# Patient Record
Sex: FEMALE | Race: WHITE | NOT HISPANIC OR LATINO | URBAN - METROPOLITAN AREA
[De-identification: names, ages, dates, MRNs, and addresses within clinical notes are randomized per-mention and may not be internally consistent; named-entity substitution may affect disease eponyms.]

---

## 2017-04-27 ENCOUNTER — OUTPATIENT (OUTPATIENT)
Dept: OUTPATIENT SERVICES | Facility: HOSPITAL | Age: 82
LOS: 1 days | Discharge: HOME | End: 2017-04-27

## 2017-05-04 ENCOUNTER — APPOINTMENT (OUTPATIENT)
Dept: CARDIOLOGY | Facility: CLINIC | Age: 82
End: 2017-05-04

## 2017-05-04 VITALS
DIASTOLIC BLOOD PRESSURE: 68 MMHG | WEIGHT: 128 LBS | HEART RATE: 59 BPM | HEIGHT: 61 IN | BODY MASS INDEX: 24.17 KG/M2 | SYSTOLIC BLOOD PRESSURE: 144 MMHG

## 2017-06-28 DIAGNOSIS — Z12.31 ENCOUNTER FOR SCREENING MAMMOGRAM FOR MALIGNANT NEOPLASM OF BREAST: ICD-10-CM

## 2017-11-01 ENCOUNTER — OUTPATIENT (OUTPATIENT)
Dept: OUTPATIENT SERVICES | Facility: HOSPITAL | Age: 82
LOS: 1 days | Discharge: HOME | End: 2017-11-01

## 2017-11-01 DIAGNOSIS — M65.331 TRIGGER FINGER, RIGHT MIDDLE FINGER: ICD-10-CM

## 2017-11-01 DIAGNOSIS — M65.321 TRIGGER FINGER, RIGHT INDEX FINGER: ICD-10-CM

## 2017-11-01 DIAGNOSIS — Z01.818 ENCOUNTER FOR OTHER PREPROCEDURAL EXAMINATION: ICD-10-CM

## 2017-11-01 DIAGNOSIS — G56.01 CARPAL TUNNEL SYNDROME, RIGHT UPPER LIMB: ICD-10-CM

## 2017-11-07 ENCOUNTER — APPOINTMENT (OUTPATIENT)
Dept: CARDIOLOGY | Facility: CLINIC | Age: 82
End: 2017-11-07

## 2017-11-07 VITALS
BODY MASS INDEX: 24.73 KG/M2 | WEIGHT: 131 LBS | DIASTOLIC BLOOD PRESSURE: 80 MMHG | SYSTOLIC BLOOD PRESSURE: 132 MMHG | HEIGHT: 61 IN

## 2017-11-07 RX ORDER — MONTELUKAST SODIUM 10 MG/1
10 TABLET, FILM COATED ORAL DAILY
Refills: 0 | Status: ACTIVE | COMMUNITY

## 2017-11-07 RX ORDER — BIOTIN 5 MG
5 CAPSULE ORAL DAILY
Refills: 0 | Status: ACTIVE | COMMUNITY

## 2017-11-15 ENCOUNTER — OUTPATIENT (OUTPATIENT)
Dept: OUTPATIENT SERVICES | Facility: HOSPITAL | Age: 82
LOS: 1 days | Discharge: HOME | End: 2017-11-15

## 2017-11-20 DIAGNOSIS — G56.01 CARPAL TUNNEL SYNDROME, RIGHT UPPER LIMB: ICD-10-CM

## 2017-11-20 DIAGNOSIS — M65.331 TRIGGER FINGER, RIGHT MIDDLE FINGER: ICD-10-CM

## 2017-11-20 DIAGNOSIS — M65.321 TRIGGER FINGER, RIGHT INDEX FINGER: ICD-10-CM

## 2017-11-20 DIAGNOSIS — M65.341 TRIGGER FINGER, RIGHT RING FINGER: ICD-10-CM

## 2017-11-20 DIAGNOSIS — E78.00 PURE HYPERCHOLESTEROLEMIA, UNSPECIFIED: ICD-10-CM

## 2018-05-22 ENCOUNTER — OUTPATIENT (OUTPATIENT)
Dept: OUTPATIENT SERVICES | Facility: HOSPITAL | Age: 83
LOS: 1 days | Discharge: HOME | End: 2018-05-22

## 2018-05-22 DIAGNOSIS — E53.8 DEFICIENCY OF OTHER SPECIFIED B GROUP VITAMINS: ICD-10-CM

## 2018-05-22 DIAGNOSIS — D64.9 ANEMIA, UNSPECIFIED: ICD-10-CM

## 2018-05-22 DIAGNOSIS — E11.9 TYPE 2 DIABETES MELLITUS WITHOUT COMPLICATIONS: ICD-10-CM

## 2018-05-22 DIAGNOSIS — E78.5 HYPERLIPIDEMIA, UNSPECIFIED: ICD-10-CM

## 2018-05-22 DIAGNOSIS — E05.90 THYROTOXICOSIS, UNSPECIFIED WITHOUT THYROTOXIC CRISIS OR STORM: ICD-10-CM

## 2018-05-22 DIAGNOSIS — D50.9 IRON DEFICIENCY ANEMIA, UNSPECIFIED: ICD-10-CM

## 2018-05-22 DIAGNOSIS — E55.9 VITAMIN D DEFICIENCY, UNSPECIFIED: ICD-10-CM

## 2018-05-22 DIAGNOSIS — N39.0 URINARY TRACT INFECTION, SITE NOT SPECIFIED: ICD-10-CM

## 2019-07-29 ENCOUNTER — OUTPATIENT (OUTPATIENT)
Dept: OUTPATIENT SERVICES | Facility: HOSPITAL | Age: 84
LOS: 1 days | Discharge: HOME | End: 2019-07-29
Payer: MEDICARE

## 2019-07-29 DIAGNOSIS — R42 DIZZINESS AND GIDDINESS: ICD-10-CM

## 2019-07-29 PROCEDURE — 93880 EXTRACRANIAL BILAT STUDY: CPT | Mod: 26

## 2019-08-01 ENCOUNTER — OUTPATIENT (OUTPATIENT)
Dept: OUTPATIENT SERVICES | Facility: HOSPITAL | Age: 84
LOS: 1 days | Discharge: HOME | End: 2019-08-01

## 2019-08-01 DIAGNOSIS — E05.90 THYROTOXICOSIS, UNSPECIFIED WITHOUT THYROTOXIC CRISIS OR STORM: ICD-10-CM

## 2019-08-01 DIAGNOSIS — E55.9 VITAMIN D DEFICIENCY, UNSPECIFIED: ICD-10-CM

## 2019-08-01 DIAGNOSIS — N39.0 URINARY TRACT INFECTION, SITE NOT SPECIFIED: ICD-10-CM

## 2019-08-01 DIAGNOSIS — D64.9 ANEMIA, UNSPECIFIED: ICD-10-CM

## 2019-08-01 DIAGNOSIS — E53.8 DEFICIENCY OF OTHER SPECIFIED B GROUP VITAMINS: ICD-10-CM

## 2019-08-01 DIAGNOSIS — E78.5 HYPERLIPIDEMIA, UNSPECIFIED: ICD-10-CM

## 2019-08-01 DIAGNOSIS — E11.9 TYPE 2 DIABETES MELLITUS WITHOUT COMPLICATIONS: ICD-10-CM

## 2019-08-01 DIAGNOSIS — E83.40 DISORDERS OF MAGNESIUM METABOLISM, UNSPECIFIED: ICD-10-CM

## 2019-08-01 DIAGNOSIS — D50.9 IRON DEFICIENCY ANEMIA, UNSPECIFIED: ICD-10-CM

## 2020-09-03 ENCOUNTER — APPOINTMENT (OUTPATIENT)
Dept: CARDIOLOGY | Facility: CLINIC | Age: 85
End: 2020-09-03
Payer: MEDICARE

## 2020-09-03 VITALS
DIASTOLIC BLOOD PRESSURE: 80 MMHG | WEIGHT: 133 LBS | TEMPERATURE: 97.2 F | HEIGHT: 61 IN | HEART RATE: 66 BPM | SYSTOLIC BLOOD PRESSURE: 142 MMHG | BODY MASS INDEX: 25.11 KG/M2

## 2020-09-03 PROCEDURE — 99214 OFFICE O/P EST MOD 30 MIN: CPT

## 2020-09-03 PROCEDURE — 93000 ELECTROCARDIOGRAM COMPLETE: CPT

## 2020-09-03 RX ORDER — SERTRALINE HYDROCHLORIDE 50 MG/1
50 TABLET, FILM COATED ORAL DAILY
Refills: 0 | Status: ACTIVE | COMMUNITY

## 2020-09-03 RX ORDER — PREDNISONE 2.5 MG/1
2.5 TABLET ORAL EVERY MORNING
Refills: 0 | Status: ACTIVE | COMMUNITY

## 2020-09-03 NOTE — REASON FOR VISIT
[Follow-Up - Clinic] : a clinic follow-up of [Coronary Artery Disease] : coronary artery disease [FreeTextEntry2] : and possible syncope

## 2020-09-03 NOTE — PHYSICAL EXAM
[General Appearance - Well Developed] : well developed [Normal Appearance] : normal appearance [Well Groomed] : well groomed [General Appearance - Well Nourished] : well nourished [General Appearance - In No Acute Distress] : no acute distress [No Deformities] : no deformities [Normal Conjunctiva] : the conjunctiva exhibited no abnormalities [Eyelids - No Xanthelasma] : the eyelids demonstrated no xanthelasmas [No Oral Pallor] : no oral pallor [Normal Oral Mucosa] : normal oral mucosa [No Oral Cyanosis] : no oral cyanosis [Normal Jugular Venous A Waves Present] : normal jugular venous A waves present [Normal Jugular Venous V Waves Present] : normal jugular venous V waves present [No Jugular Venous Jonas A Waves] : no jugular venous jonas A waves [Exaggerated Use Of Accessory Muscles For Inspiration] : no accessory muscle use [Respiration, Rhythm And Depth] : normal respiratory rhythm and effort [Auscultation Breath Sounds / Voice Sounds] : lungs were clear to auscultation bilaterally [Heart Rate And Rhythm] : heart rate and rhythm were normal [Abdomen Soft] : soft [Murmurs] : no murmurs present [Heart Sounds] : normal S1 and S2 [Abdomen Tenderness] : non-tender [Abdomen Mass (___ Cm)] : no abdominal mass palpated [Abnormal Walk] : normal gait [Gait - Sufficient For Exercise Testing] : the gait was sufficient for exercise testing [Petechial Hemorrhages (___cm)] : no petechial hemorrhages [Nail Clubbing] : no clubbing of the fingernails [Cyanosis, Localized] : no localized cyanosis [Skin Color & Pigmentation] : normal skin color and pigmentation [] : no rash [No Venous Stasis] : no venous stasis [Skin Lesions] : no skin lesions [No Skin Ulcers] : no skin ulcer [Oriented To Time, Place, And Person] : oriented to person, place, and time [No Xanthoma] : no  xanthoma was observed [Affect] : the affect was normal [Mood] : the mood was normal [No Anxiety] : not feeling anxious

## 2020-09-03 NOTE — HISTORY OF PRESENT ILLNESS
[FreeTextEntry1] : no cp\par  no sob\par  no cardiac complaints\par \par s/p cabg\par class 1 angina\par class 1 sob\par no cardiac complaints\par \par patient had a syncopal episode of unceraint etiology\par denied chest pain or sob\par denied palpitatins

## 2020-09-06 ENCOUNTER — OUTPATIENT (OUTPATIENT)
Dept: OUTPATIENT SERVICES | Facility: HOSPITAL | Age: 85
LOS: 1 days | Discharge: HOME | End: 2020-09-06
Payer: MEDICARE

## 2020-09-06 DIAGNOSIS — R51 HEADACHE: ICD-10-CM

## 2020-09-06 DIAGNOSIS — R41.3 OTHER AMNESIA: ICD-10-CM

## 2020-09-06 DIAGNOSIS — R42 DIZZINESS AND GIDDINESS: ICD-10-CM

## 2020-09-06 PROCEDURE — 70552 MRI BRAIN STEM W/DYE: CPT | Mod: 26

## 2020-09-12 ENCOUNTER — APPOINTMENT (OUTPATIENT)
Dept: CARDIOLOGY | Facility: CLINIC | Age: 85
End: 2020-09-12
Payer: MEDICARE

## 2020-09-12 PROCEDURE — 93306 TTE W/DOPPLER COMPLETE: CPT

## 2020-10-25 ENCOUNTER — OUTPATIENT (OUTPATIENT)
Dept: OUTPATIENT SERVICES | Facility: HOSPITAL | Age: 85
LOS: 1 days | Discharge: HOME | End: 2020-10-25
Payer: MEDICARE

## 2020-10-25 ENCOUNTER — RESULT REVIEW (OUTPATIENT)
Age: 85
End: 2020-10-25

## 2020-10-25 DIAGNOSIS — R10.2 PELVIC AND PERINEAL PAIN: ICD-10-CM

## 2020-10-25 PROCEDURE — 72195 MRI PELVIS W/O DYE: CPT | Mod: 26

## 2020-10-29 ENCOUNTER — APPOINTMENT (OUTPATIENT)
Dept: CARDIOLOGY | Facility: CLINIC | Age: 85
End: 2020-10-29
Payer: MEDICARE

## 2020-10-29 VITALS
SYSTOLIC BLOOD PRESSURE: 140 MMHG | DIASTOLIC BLOOD PRESSURE: 82 MMHG | TEMPERATURE: 97.3 F | HEART RATE: 66 BPM | WEIGHT: 130 LBS | BODY MASS INDEX: 24.55 KG/M2 | HEIGHT: 61 IN

## 2020-10-29 PROCEDURE — 99214 OFFICE O/P EST MOD 30 MIN: CPT

## 2020-10-29 PROCEDURE — 93000 ELECTROCARDIOGRAM COMPLETE: CPT

## 2020-10-29 PROCEDURE — 99072 ADDL SUPL MATRL&STAF TM PHE: CPT

## 2020-10-29 NOTE — PHYSICAL EXAM
[General Appearance - Well Developed] : well developed [Normal Appearance] : normal appearance [Well Groomed] : well groomed [General Appearance - Well Nourished] : well nourished [No Deformities] : no deformities [General Appearance - In No Acute Distress] : no acute distress [Normal Conjunctiva] : the conjunctiva exhibited no abnormalities [Eyelids - No Xanthelasma] : the eyelids demonstrated no xanthelasmas [Normal Oral Mucosa] : normal oral mucosa [No Oral Pallor] : no oral pallor [No Oral Cyanosis] : no oral cyanosis [Normal Jugular Venous A Waves Present] : normal jugular venous A waves present [Normal Jugular Venous V Waves Present] : normal jugular venous V waves present [No Jugular Venous Jonas A Waves] : no jugular venous jonas A waves [Respiration, Rhythm And Depth] : normal respiratory rhythm and effort [Exaggerated Use Of Accessory Muscles For Inspiration] : no accessory muscle use [Auscultation Breath Sounds / Voice Sounds] : lungs were clear to auscultation bilaterally [Heart Rate And Rhythm] : heart rate and rhythm were normal [Heart Sounds] : normal S1 and S2 [Murmurs] : no murmurs present [Abdomen Soft] : soft [Abdomen Tenderness] : non-tender [Abdomen Mass (___ Cm)] : no abdominal mass palpated [Abnormal Walk] : normal gait [Gait - Sufficient For Exercise Testing] : the gait was sufficient for exercise testing [Nail Clubbing] : no clubbing of the fingernails [Cyanosis, Localized] : no localized cyanosis [Petechial Hemorrhages (___cm)] : no petechial hemorrhages [Skin Color & Pigmentation] : normal skin color and pigmentation [] : no rash [No Venous Stasis] : no venous stasis [Skin Lesions] : no skin lesions [No Skin Ulcers] : no skin ulcer [No Xanthoma] : no  xanthoma was observed [Oriented To Time, Place, And Person] : oriented to person, place, and time [Affect] : the affect was normal [Mood] : the mood was normal [No Anxiety] : not feeling anxious

## 2020-11-21 ENCOUNTER — RESULT REVIEW (OUTPATIENT)
Age: 85
End: 2020-11-21

## 2020-11-21 ENCOUNTER — OUTPATIENT (OUTPATIENT)
Dept: OUTPATIENT SERVICES | Facility: HOSPITAL | Age: 85
LOS: 1 days | Discharge: HOME | End: 2020-11-21
Payer: MEDICARE

## 2020-11-21 DIAGNOSIS — M54.5 LOW BACK PAIN: ICD-10-CM

## 2020-11-21 PROCEDURE — 72148 MRI LUMBAR SPINE W/O DYE: CPT | Mod: 26

## 2020-11-24 ENCOUNTER — OUTPATIENT (OUTPATIENT)
Dept: OUTPATIENT SERVICES | Facility: HOSPITAL | Age: 85
LOS: 1 days | Discharge: HOME | End: 2020-11-24

## 2020-11-25 DIAGNOSIS — M89.9 DISORDER OF BONE, UNSPECIFIED: ICD-10-CM

## 2020-11-25 DIAGNOSIS — Z13.820 ENCOUNTER FOR SCREENING FOR OSTEOPOROSIS: ICD-10-CM

## 2020-11-25 DIAGNOSIS — Z78.0 ASYMPTOMATIC MENOPAUSAL STATE: ICD-10-CM

## 2021-03-04 ENCOUNTER — APPOINTMENT (OUTPATIENT)
Dept: CARDIOLOGY | Facility: CLINIC | Age: 86
End: 2021-03-04

## 2021-05-05 ENCOUNTER — APPOINTMENT (OUTPATIENT)
Dept: CARDIOLOGY | Facility: CLINIC | Age: 86
End: 2021-05-05
Payer: MEDICARE

## 2021-05-05 VITALS
TEMPERATURE: 97.6 F | DIASTOLIC BLOOD PRESSURE: 82 MMHG | HEART RATE: 57 BPM | HEIGHT: 61 IN | WEIGHT: 133 LBS | BODY MASS INDEX: 25.11 KG/M2 | SYSTOLIC BLOOD PRESSURE: 140 MMHG

## 2021-05-05 PROCEDURE — 99072 ADDL SUPL MATRL&STAF TM PHE: CPT

## 2021-05-05 PROCEDURE — 99214 OFFICE O/P EST MOD 30 MIN: CPT

## 2021-05-05 PROCEDURE — 93000 ELECTROCARDIOGRAM COMPLETE: CPT

## 2021-05-05 NOTE — PHYSICAL EXAM
[General Appearance - Well Developed] : well developed [Normal Appearance] : normal appearance [Well Groomed] : well groomed [General Appearance - Well Nourished] : well nourished [No Deformities] : no deformities [General Appearance - In No Acute Distress] : no acute distress [Normal Conjunctiva] : the conjunctiva exhibited no abnormalities [Eyelids - No Xanthelasma] : the eyelids demonstrated no xanthelasmas [Normal Oral Mucosa] : normal oral mucosa [No Oral Pallor] : no oral pallor [No Oral Cyanosis] : no oral cyanosis [Normal Jugular Venous A Waves Present] : normal jugular venous A waves present [Normal Jugular Venous V Waves Present] : normal jugular venous V waves present [No Jugular Venous Jonas A Waves] : no jugular venous jonas A waves [Respiration, Rhythm And Depth] : normal respiratory rhythm and effort [Exaggerated Use Of Accessory Muscles For Inspiration] : no accessory muscle use [Auscultation Breath Sounds / Voice Sounds] : lungs were clear to auscultation bilaterally [Heart Rate And Rhythm] : heart rate and rhythm were normal [Heart Sounds] : normal S1 and S2 [Murmurs] : no murmurs present [Abdomen Soft] : soft [Abdomen Tenderness] : non-tender [Abdomen Mass (___ Cm)] : no abdominal mass palpated [Abnormal Walk] : normal gait [Gait - Sufficient For Exercise Testing] : the gait was sufficient for exercise testing [Nail Clubbing] : no clubbing of the fingernails [Cyanosis, Localized] : no localized cyanosis [Petechial Hemorrhages (___cm)] : no petechial hemorrhages [Skin Color & Pigmentation] : normal skin color and pigmentation [] : no rash [No Venous Stasis] : no venous stasis [No Skin Ulcers] : no skin ulcer [Skin Lesions] : no skin lesions [No Xanthoma] : no  xanthoma was observed [Oriented To Time, Place, And Person] : oriented to person, place, and time [Affect] : the affect was normal [Mood] : the mood was normal [No Anxiety] : not feeling anxious

## 2021-05-11 NOTE — HISTORY OF PRESENT ILLNESS
[FreeTextEntry1] : no cp\par  no sob\par  no cardiac complaints\par \par s/p cabg OVER 20 YEARS AGO\par [place] TO THE LAD\par class 1 angina\par class 1 sob\par no cardiac complaints\par \par patient had a syncopal episode of unceraint etiology\par denied chest pain or sob\par denied palpitatins\par \par NO RECURRENCE \par NO TIA, CVA OR PVD\par  NO PALPS

## 2021-09-29 ENCOUNTER — APPOINTMENT (OUTPATIENT)
Dept: CARDIOLOGY | Facility: CLINIC | Age: 86
End: 2021-09-29
Payer: MEDICARE

## 2021-09-29 VITALS
BODY MASS INDEX: 25.3 KG/M2 | DIASTOLIC BLOOD PRESSURE: 90 MMHG | SYSTOLIC BLOOD PRESSURE: 154 MMHG | TEMPERATURE: 97.1 F | WEIGHT: 134 LBS | HEART RATE: 61 BPM | HEIGHT: 61 IN

## 2021-09-29 PROCEDURE — 93000 ELECTROCARDIOGRAM COMPLETE: CPT

## 2021-09-29 PROCEDURE — 99214 OFFICE O/P EST MOD 30 MIN: CPT

## 2021-09-29 RX ORDER — CHROMIUM 200 MCG
TABLET ORAL
Refills: 0 | Status: ACTIVE | COMMUNITY

## 2021-09-29 NOTE — PHYSICAL EXAM
[Well Developed] : well developed [Well Nourished] : well nourished [No Acute Distress] : no acute distress [Normal Venous Pressure] : normal venous pressure [No Carotid Bruit] : no carotid bruit [Normal S1, S2] : normal S1, S2 [No Murmur] : no murmur [No Rub] : no rub [No Gallop] : no gallop [Clear Lung Fields] : clear lung fields [Good Air Entry] : good air entry [No Respiratory Distress] : no respiratory distress  [Soft] : abdomen soft [Non Tender] : non-tender [No Masses/organomegaly] : no masses/organomegaly [Normal Bowel Sounds] : normal bowel sounds [Normal Gait] : normal gait [No Edema] : no edema [No Cyanosis] : no cyanosis [No Clubbing] : no clubbing [No Varicosities] : no varicosities [No Rash] : no rash [No Skin Lesions] : no skin lesions [Moves all extremities] : moves all extremities [No Focal Deficits] : no focal deficits [Normal Speech] : normal speech [Alert and Oriented] : alert and oriented [Normal memory] : normal memory [General Appearance - Well Developed] : well developed [Normal Appearance] : normal appearance [Well Groomed] : well groomed [General Appearance - Well Nourished] : well nourished [No Deformities] : no deformities [General Appearance - In No Acute Distress] : no acute distress [Normal Conjunctiva] : the conjunctiva exhibited no abnormalities [Eyelids - No Xanthelasma] : the eyelids demonstrated no xanthelasmas [Normal Oral Mucosa] : normal oral mucosa [No Oral Pallor] : no oral pallor [No Oral Cyanosis] : no oral cyanosis [Normal Jugular Venous A Waves Present] : normal jugular venous A waves present [Normal Jugular Venous V Waves Present] : normal jugular venous V waves present [No Jugular Venous Jonas A Waves] : no jugular venous jonas A waves [Respiration, Rhythm And Depth] : normal respiratory rhythm and effort [Exaggerated Use Of Accessory Muscles For Inspiration] : no accessory muscle use [Auscultation Breath Sounds / Voice Sounds] : lungs were clear to auscultation bilaterally [Heart Rate And Rhythm] : heart rate and rhythm were normal [Heart Sounds] : normal S1 and S2 [Murmurs] : no murmurs present [Abdomen Tenderness] : non-tender [Abdomen Soft] : soft [Abdomen Mass (___ Cm)] : no abdominal mass palpated [Abnormal Walk] : normal gait [Gait - Sufficient For Exercise Testing] : the gait was sufficient for exercise testing [Nail Clubbing] : no clubbing of the fingernails [Cyanosis, Localized] : no localized cyanosis [Petechial Hemorrhages (___cm)] : no petechial hemorrhages [Skin Color & Pigmentation] : normal skin color and pigmentation [] : no rash [No Venous Stasis] : no venous stasis [Skin Lesions] : no skin lesions [No Skin Ulcers] : no skin ulcer [No Xanthoma] : no  xanthoma was observed [Oriented To Time, Place, And Person] : oriented to person, place, and time [Affect] : the affect was normal [Mood] : the mood was normal [No Anxiety] : not feeling anxious

## 2021-09-29 NOTE — HISTORY OF PRESENT ILLNESS
[FreeTextEntry1] : no cp\par  no sob\par  no cardiac complaints\par \par s/p cabg OVER 23 YEARS AGO\par [place] TO THE LAD\par class 1 angina\par class 1 sob\par no cardiac complaints\par \par patient had a syncopal episode of unceraint etiology\par denied chest pain or sob\par denied palpitatins\par \par NO RECURRENCE \par NO TIA, CVA OR PVD\par  NO PALPS

## 2022-07-20 ENCOUNTER — EMERGENCY (EMERGENCY)
Facility: HOSPITAL | Age: 87
LOS: 0 days | Discharge: LEFT BEFORE DISCHARGE | End: 2022-07-20
Admitting: EMERGENCY MEDICINE

## 2022-07-20 VITALS
TEMPERATURE: 98 F | HEART RATE: 65 BPM | RESPIRATION RATE: 18 BRPM | OXYGEN SATURATION: 92 % | DIASTOLIC BLOOD PRESSURE: 59 MMHG | SYSTOLIC BLOOD PRESSURE: 111 MMHG

## 2022-07-20 DIAGNOSIS — Z53.21 PROCEDURE AND TREATMENT NOT CARRIED OUT DUE TO PATIENT LEAVING PRIOR TO BEING SEEN BY HEALTH CARE PROVIDER: ICD-10-CM

## 2022-07-20 DIAGNOSIS — F10.929 ALCOHOL USE, UNSPECIFIED WITH INTOXICATION, UNSPECIFIED: ICD-10-CM

## 2022-07-20 PROCEDURE — L9991: CPT

## 2022-07-20 NOTE — ED ADULT TRIAGE NOTE - CHIEF COMPLAINT QUOTE
Pt was out to dinner with friends endorses ~5 drinks while at [place]. Pt found on floor sitting up. As per EMS pt was stumbling and having a hard time walking and didn't have anyone to call . bgl 98 Denies trauma or injury

## 2022-11-09 ENCOUNTER — APPOINTMENT (OUTPATIENT)
Dept: CARDIOLOGY | Facility: CLINIC | Age: 87
End: 2022-11-09

## 2022-11-09 VITALS
HEART RATE: 66 BPM | TEMPERATURE: 97.3 F | HEIGHT: 61 IN | DIASTOLIC BLOOD PRESSURE: 88 MMHG | BODY MASS INDEX: 25.49 KG/M2 | WEIGHT: 135 LBS | SYSTOLIC BLOOD PRESSURE: 156 MMHG

## 2022-11-09 PROCEDURE — 99214 OFFICE O/P EST MOD 30 MIN: CPT

## 2022-11-09 PROCEDURE — 93000 ELECTROCARDIOGRAM COMPLETE: CPT

## 2022-11-09 NOTE — PHYSICAL EXAM
[Well Developed] : well developed [Well Nourished] : well nourished [No Acute Distress] : no acute distress [Normal Venous Pressure] : normal venous pressure [No Carotid Bruit] : no carotid bruit [Normal S1, S2] : normal S1, S2 [No Murmur] : no murmur [No Rub] : no rub [No Gallop] : no gallop [Clear Lung Fields] : clear lung fields [Good Air Entry] : good air entry [No Respiratory Distress] : no respiratory distress  [Soft] : abdomen soft [Non Tender] : non-tender [No Masses/organomegaly] : no masses/organomegaly [Normal Bowel Sounds] : normal bowel sounds [Normal Gait] : normal gait [No Edema] : no edema [No Cyanosis] : no cyanosis [No Clubbing] : no clubbing [No Varicosities] : no varicosities [No Rash] : no rash [No Skin Lesions] : no skin lesions [Moves all extremities] : moves all extremities [No Focal Deficits] : no focal deficits [Normal Speech] : normal speech [Alert and Oriented] : alert and oriented [Normal memory] : normal memory [General Appearance - Well Developed] : well developed [Normal Appearance] : normal appearance [Well Groomed] : well groomed [General Appearance - Well Nourished] : well nourished [No Deformities] : no deformities [General Appearance - In No Acute Distress] : no acute distress [Normal Conjunctiva] : the conjunctiva exhibited no abnormalities [Eyelids - No Xanthelasma] : the eyelids demonstrated no xanthelasmas [Normal Oral Mucosa] : normal oral mucosa [No Oral Pallor] : no oral pallor [No Oral Cyanosis] : no oral cyanosis [Normal Jugular Venous A Waves Present] : normal jugular venous A waves present [Normal Jugular Venous V Waves Present] : normal jugular venous V waves present [No Jugular Venous Jonas A Waves] : no jugular venous jonas A waves [Respiration, Rhythm And Depth] : normal respiratory rhythm and effort [Exaggerated Use Of Accessory Muscles For Inspiration] : no accessory muscle use [Auscultation Breath Sounds / Voice Sounds] : lungs were clear to auscultation bilaterally [Heart Rate And Rhythm] : heart rate and rhythm were normal [Heart Sounds] : normal S1 and S2 [Murmurs] : no murmurs present [Abdomen Soft] : soft [Abdomen Tenderness] : non-tender [Abdomen Mass (___ Cm)] : no abdominal mass palpated [Abnormal Walk] : normal gait [Gait - Sufficient For Exercise Testing] : the gait was sufficient for exercise testing [Nail Clubbing] : no clubbing of the fingernails [Cyanosis, Localized] : no localized cyanosis [Petechial Hemorrhages (___cm)] : no petechial hemorrhages [Skin Color & Pigmentation] : normal skin color and pigmentation [] : no rash [No Venous Stasis] : no venous stasis [Skin Lesions] : no skin lesions [No Skin Ulcers] : no skin ulcer [No Xanthoma] : no  xanthoma was observed [Oriented To Time, Place, And Person] : oriented to person, place, and time [Affect] : the affect was normal [Mood] : the mood was normal [No Anxiety] : not feeling anxious

## 2023-05-09 ENCOUNTER — APPOINTMENT (OUTPATIENT)
Dept: CARDIOLOGY | Facility: CLINIC | Age: 88
End: 2023-05-09

## 2023-08-03 ENCOUNTER — NON-APPOINTMENT (OUTPATIENT)
Age: 88
End: 2023-08-03

## 2023-08-16 ENCOUNTER — RESULT CHARGE (OUTPATIENT)
Age: 88
End: 2023-08-16

## 2023-08-16 ENCOUNTER — APPOINTMENT (OUTPATIENT)
Dept: CARDIOLOGY | Facility: CLINIC | Age: 88
End: 2023-08-16
Payer: MEDICARE

## 2023-08-16 VITALS
WEIGHT: 139 LBS | HEIGHT: 61 IN | BODY MASS INDEX: 26.24 KG/M2 | HEART RATE: 59 BPM | DIASTOLIC BLOOD PRESSURE: 80 MMHG | SYSTOLIC BLOOD PRESSURE: 178 MMHG

## 2023-08-16 DIAGNOSIS — I25.10 ATHEROSCLEROTIC HEART DISEASE OF NATIVE CORONARY ARTERY W/OUT ANGINA PECTORIS: ICD-10-CM

## 2023-08-16 PROCEDURE — 93000 ELECTROCARDIOGRAM COMPLETE: CPT

## 2023-08-16 PROCEDURE — 99214 OFFICE O/P EST MOD 30 MIN: CPT

## 2024-05-19 ENCOUNTER — INPATIENT (INPATIENT)
Facility: HOSPITAL | Age: 89
LOS: 1 days | Discharge: HOME HEALTH | DRG: 65 | End: 2024-05-21
Attending: PSYCHIATRY & NEUROLOGY | Admitting: PSYCHIATRY & NEUROLOGY
Payer: MEDICARE

## 2024-05-19 VITALS
RESPIRATION RATE: 19 BRPM | OXYGEN SATURATION: 96 % | HEART RATE: 61 BPM | DIASTOLIC BLOOD PRESSURE: 91 MMHG | SYSTOLIC BLOOD PRESSURE: 179 MMHG | TEMPERATURE: 98 F

## 2024-05-19 DIAGNOSIS — Z78.9 OTHER SPECIFIED HEALTH STATUS: Chronic | ICD-10-CM

## 2024-05-19 DIAGNOSIS — I63.9 CEREBRAL INFARCTION, UNSPECIFIED: ICD-10-CM

## 2024-05-19 LAB
ALBUMIN SERPL ELPH-MCNC: 4.5 G/DL — SIGNIFICANT CHANGE UP (ref 3.5–5.2)
ALP SERPL-CCNC: 87 U/L — SIGNIFICANT CHANGE UP (ref 30–115)
ALT FLD-CCNC: 25 U/L — SIGNIFICANT CHANGE UP (ref 0–41)
ANION GAP SERPL CALC-SCNC: 13 MMOL/L — SIGNIFICANT CHANGE UP (ref 7–14)
APTT BLD: 27 SEC — SIGNIFICANT CHANGE UP (ref 27–39.2)
AST SERPL-CCNC: 28 U/L — SIGNIFICANT CHANGE UP (ref 0–41)
BASOPHILS # BLD AUTO: 0.06 K/UL — SIGNIFICANT CHANGE UP (ref 0–0.2)
BASOPHILS NFR BLD AUTO: 0.9 % — SIGNIFICANT CHANGE UP (ref 0–1)
BILIRUB SERPL-MCNC: 0.6 MG/DL — SIGNIFICANT CHANGE UP (ref 0.2–1.2)
BUN SERPL-MCNC: 13 MG/DL — SIGNIFICANT CHANGE UP (ref 10–20)
CALCIUM SERPL-MCNC: 9.4 MG/DL — SIGNIFICANT CHANGE UP (ref 8.4–10.5)
CHLORIDE SERPL-SCNC: 106 MMOL/L — SIGNIFICANT CHANGE UP (ref 98–110)
CO2 SERPL-SCNC: 21 MMOL/L — SIGNIFICANT CHANGE UP (ref 17–32)
CREAT SERPL-MCNC: 0.8 MG/DL — SIGNIFICANT CHANGE UP (ref 0.7–1.5)
EGFR: 72 ML/MIN/1.73M2 — SIGNIFICANT CHANGE UP
EOSINOPHIL # BLD AUTO: 0.37 K/UL — SIGNIFICANT CHANGE UP (ref 0–0.7)
EOSINOPHIL NFR BLD AUTO: 5.3 % — SIGNIFICANT CHANGE UP (ref 0–8)
GLUCOSE SERPL-MCNC: 104 MG/DL — HIGH (ref 70–99)
HCT VFR BLD CALC: 40.7 % — SIGNIFICANT CHANGE UP (ref 37–47)
HGB BLD-MCNC: 13.4 G/DL — SIGNIFICANT CHANGE UP (ref 12–16)
IMM GRANULOCYTES NFR BLD AUTO: 0.3 % — SIGNIFICANT CHANGE UP (ref 0.1–0.3)
INR BLD: 1.02 RATIO — SIGNIFICANT CHANGE UP (ref 0.65–1.3)
LYMPHOCYTES # BLD AUTO: 1.41 K/UL — SIGNIFICANT CHANGE UP (ref 1.2–3.4)
LYMPHOCYTES # BLD AUTO: 20.4 % — LOW (ref 20.5–51.1)
MCHC RBC-ENTMCNC: 31.1 PG — HIGH (ref 27–31)
MCHC RBC-ENTMCNC: 32.9 G/DL — SIGNIFICANT CHANGE UP (ref 32–37)
MCV RBC AUTO: 94.4 FL — SIGNIFICANT CHANGE UP (ref 81–99)
MONOCYTES # BLD AUTO: 0.65 K/UL — HIGH (ref 0.1–0.6)
MONOCYTES NFR BLD AUTO: 9.4 % — HIGH (ref 1.7–9.3)
NEUTROPHILS # BLD AUTO: 4.41 K/UL — SIGNIFICANT CHANGE UP (ref 1.4–6.5)
NEUTROPHILS NFR BLD AUTO: 63.7 % — SIGNIFICANT CHANGE UP (ref 42.2–75.2)
NRBC # BLD: 0 /100 WBCS — SIGNIFICANT CHANGE UP (ref 0–0)
PLATELET # BLD AUTO: 248 K/UL — SIGNIFICANT CHANGE UP (ref 130–400)
PMV BLD: 10.1 FL — SIGNIFICANT CHANGE UP (ref 7.4–10.4)
POTASSIUM SERPL-MCNC: 4.4 MMOL/L — SIGNIFICANT CHANGE UP (ref 3.5–5)
POTASSIUM SERPL-SCNC: 4.4 MMOL/L — SIGNIFICANT CHANGE UP (ref 3.5–5)
PROT SERPL-MCNC: 7 G/DL — SIGNIFICANT CHANGE UP (ref 6–8)
PROTHROM AB SERPL-ACNC: 11.6 SEC — SIGNIFICANT CHANGE UP (ref 9.95–12.87)
RBC # BLD: 4.31 M/UL — SIGNIFICANT CHANGE UP (ref 4.2–5.4)
RBC # FLD: 14.5 % — SIGNIFICANT CHANGE UP (ref 11.5–14.5)
SODIUM SERPL-SCNC: 140 MMOL/L — SIGNIFICANT CHANGE UP (ref 135–146)
WBC # BLD: 6.92 K/UL — SIGNIFICANT CHANGE UP (ref 4.8–10.8)
WBC # FLD AUTO: 6.92 K/UL — SIGNIFICANT CHANGE UP (ref 4.8–10.8)

## 2024-05-19 PROCEDURE — 70551 MRI BRAIN STEM W/O DYE: CPT | Mod: 26,MC

## 2024-05-19 PROCEDURE — 80061 LIPID PANEL: CPT

## 2024-05-19 PROCEDURE — 81003 URINALYSIS AUTO W/O SCOPE: CPT

## 2024-05-19 PROCEDURE — 70450 CT HEAD/BRAIN W/O DYE: CPT | Mod: 26,MC,XU

## 2024-05-19 PROCEDURE — 70496 CT ANGIOGRAPHY HEAD: CPT | Mod: 26,MC

## 2024-05-19 PROCEDURE — 97166 OT EVAL MOD COMPLEX 45 MIN: CPT | Mod: GO

## 2024-05-19 PROCEDURE — 70498 CT ANGIOGRAPHY NECK: CPT | Mod: 26,MC

## 2024-05-19 PROCEDURE — 85025 COMPLETE CBC W/AUTO DIFF WBC: CPT

## 2024-05-19 PROCEDURE — 97530 THERAPEUTIC ACTIVITIES: CPT | Mod: GP

## 2024-05-19 PROCEDURE — 97162 PT EVAL MOD COMPLEX 30 MIN: CPT | Mod: GP

## 2024-05-19 PROCEDURE — 72141 MRI NECK SPINE W/O DYE: CPT | Mod: 26,MC

## 2024-05-19 PROCEDURE — 0042T: CPT | Mod: MC

## 2024-05-19 PROCEDURE — 80048 BASIC METABOLIC PNL TOTAL CA: CPT

## 2024-05-19 PROCEDURE — 36415 COLL VENOUS BLD VENIPUNCTURE: CPT

## 2024-05-19 PROCEDURE — 80053 COMPREHEN METABOLIC PANEL: CPT

## 2024-05-19 PROCEDURE — 92610 EVALUATE SWALLOWING FUNCTION: CPT | Mod: GN

## 2024-05-19 PROCEDURE — 83036 HEMOGLOBIN GLYCOSYLATED A1C: CPT

## 2024-05-19 PROCEDURE — 84100 ASSAY OF PHOSPHORUS: CPT

## 2024-05-19 PROCEDURE — 99291 CRITICAL CARE FIRST HOUR: CPT

## 2024-05-19 PROCEDURE — 83735 ASSAY OF MAGNESIUM: CPT

## 2024-05-19 PROCEDURE — 84443 ASSAY THYROID STIM HORMONE: CPT

## 2024-05-19 PROCEDURE — 97116 GAIT TRAINING THERAPY: CPT | Mod: GP

## 2024-05-19 RX ORDER — CLOPIDOGREL BISULFATE 75 MG/1
75 TABLET, FILM COATED ORAL DAILY
Refills: 0 | Status: DISCONTINUED | OUTPATIENT
Start: 2024-05-20 | End: 2024-05-21

## 2024-05-19 RX ORDER — SODIUM CHLORIDE 9 MG/ML
1000 INJECTION, SOLUTION INTRAVENOUS ONCE
Refills: 0 | Status: COMPLETED | OUTPATIENT
Start: 2024-05-19 | End: 2024-05-19

## 2024-05-19 RX ORDER — ASPIRIN/CALCIUM CARB/MAGNESIUM 324 MG
81 TABLET ORAL DAILY
Refills: 0 | Status: DISCONTINUED | OUTPATIENT
Start: 2024-05-20 | End: 2024-05-21

## 2024-05-19 RX ORDER — CLOPIDOGREL BISULFATE 75 MG/1
300 TABLET, FILM COATED ORAL ONCE
Refills: 0 | Status: COMPLETED | OUTPATIENT
Start: 2024-05-19 | End: 2024-05-19

## 2024-05-19 RX ORDER — ENOXAPARIN SODIUM 100 MG/ML
40 INJECTION SUBCUTANEOUS EVERY 24 HOURS
Refills: 0 | Status: DISCONTINUED | OUTPATIENT
Start: 2024-05-19 | End: 2024-05-21

## 2024-05-19 RX ORDER — ASPIRIN/CALCIUM CARB/MAGNESIUM 324 MG
325 TABLET ORAL ONCE
Refills: 0 | Status: COMPLETED | OUTPATIENT
Start: 2024-05-19 | End: 2024-05-19

## 2024-05-19 RX ORDER — ATORVASTATIN CALCIUM 80 MG/1
80 TABLET, FILM COATED ORAL AT BEDTIME
Refills: 0 | Status: DISCONTINUED | OUTPATIENT
Start: 2024-05-19 | End: 2024-05-21

## 2024-05-19 RX ADMIN — ATORVASTATIN CALCIUM 80 MILLIGRAM(S): 80 TABLET, FILM COATED ORAL at 23:12

## 2024-05-19 RX ADMIN — CLOPIDOGREL BISULFATE 300 MILLIGRAM(S): 75 TABLET, FILM COATED ORAL at 23:13

## 2024-05-19 RX ADMIN — ENOXAPARIN SODIUM 40 MILLIGRAM(S): 100 INJECTION SUBCUTANEOUS at 23:13

## 2024-05-19 RX ADMIN — SODIUM CHLORIDE 1000 MILLILITER(S): 9 INJECTION, SOLUTION INTRAVENOUS at 11:14

## 2024-05-19 RX ADMIN — Medication 325 MILLIGRAM(S): at 23:12

## 2024-05-19 NOTE — ED ADULT NURSE NOTE - OBJECTIVE STATEMENT
Aox3 pt c/o weakness to R arm and leg since last night. Stroke called by provider. Pt taken to CT, IV placed, labs drawn and fluids started per provider order. Speech clear and pt passed dysphagia

## 2024-05-19 NOTE — ED CDU PROVIDER INITIAL DAY NOTE - OBJECTIVE STATEMENT
: 88-year-old female with history of CAD status post CABG presents to ED with complaints of right sided weakness beginning last night.  Patient states that around 10 PM last night she felt like her right arm did not feel right, felt weak.  This morning after taking a shower she felt generalized weakness and had to sit down.  She was able to crawl to get her son's attention and son picked her up and put her on the couch.  Patient still reports weakness of her right arm.  She denies any numbness or tingling, blurry vision, lightheadedness, pain, headache, chest pain, or difficulty breathing.  Patient was recently on vacation in a different state, and a small fall and hit to head approximately 1 week ago, has been okay without any headache or amatory difficulties since.  Patient was able to ambulate in the ED.  Stroke code called in ED.

## 2024-05-19 NOTE — ED PROVIDER NOTE - ATTENDING CONTRIBUTION TO CARE
88-year-old female past medical history of CAD CABG, drinks 3-4 glasses of wine daily presents with right-sided weakness since 10 PM yesterday.  Patient flew back from [place] yesterday was last seen normal at 9 PM by son.  At 10 PM patient noticed her right arm felt off.  This morning after patient took a shower felt like her legs were going to give out so she sat herself on the floor and crawled to alert her son.  Did not fall or hit her head at that time.  Son sat her on the couch, and states that after about 10 minutes patient was back up and walking.  Still however feels like her right side feels off.  Right-hand-dominant.  Patient states 1 week ago in [place] she was sitting on the floor fixing something on the fridge and fell back hit her head but states it was minor did not pass out or have headaches.  Denies any blood thinners.    On exam, AFVSS, Well appearing, No acute distress, NCAT, EOMI, PERRLA, MMM, Neck supple, LCTAB, RRR nl s1s2 No mrg, Abdomen Soft NTND, , No LE edema or calf TTP, aaox3, CN 2-12 intact, No nystagmus.  5/5 motor x 2 ext, right upper and right lower extremity 4/5 motor, SILT x 4 extremities, No facial droop or slurred speech.  Right-sided pronator drift.  Normal rapid alternating movement and finger nose finger bilaterally. No midline C/T/L tenderness to palpation or step off.     a/p: Concern for stroke, stroke code called, neuro ROSARIO Melo at bedside, labs CT scan telestroke consult reeval  spoke with Cynthia from [facility], states out of window for TNK 88-year-old female past medical history of CAD CABG, drinks 3-4 glasses of wine daily presents with right-sided weakness since 10 PM yesterday.  Patient flew back from [place] yesterday was last seen normal at 9 PM by son.  At 10 PM patient noticed her right arm felt off.  This morning after patient took a shower felt like her legs were going to give out so she sat herself on the floor and crawled to alert her son.  Did not fall or hit her head at that time.  Son sat her on the couch, and states that after about 10 minutes patient was back up and walking.  Still however feels like her right side feels off.  Right-hand-dominant.  Patient states 1 week ago in [place] she was sitting on the floor fixing something on the fridge and fell back hit her head but states it was minor did not pass out or have headaches.  Denies any blood thinners.    On exam, AFVSS, Well appearing, No acute distress, NCAT, EOMI, PERRLA, MMM, Neck supple, LCTAB, RRR nl s1s2 No mrg, Abdomen Soft NTND, , No LE edema or calf TTP, aaox3, CN 2-12 intact, No nystagmus.  5/5 motor x 2 ext, right upper and right lower extremity 4/5 motor, SILT x 4 extremities, No facial droop or slurred speech.  Right-sided pronator drift.  Normal rapid alternating movement and finger nose finger bilaterally. No midline C/T/L tenderness to palpation or step off.     a/p: Concern for stroke, stroke code called, neuro ROSARIO Melo at bedside, labs CT scan telestroke consult reeval  spoke with Susanne from [facility], states out of window for TNK

## 2024-05-19 NOTE — H&P ADULT - NSHPLABSRESULTS_GEN_ALL_CORE
LABS:  cret                        13.4   6.92  )-----------( 248      ( 19 May 2024 10:53 )             40.7     05-19    140  |  106  |  13  ----------------------------<  104<H>  4.4   |  21  |  0.8    Ca    9.4      19 May 2024 10:53    TPro  7.0  /  Alb  4.5  /  TBili  0.6  /  DBili  x   /  AST  28  /  ALT  25  /  AlkPhos  87  05-19    PT/INR - ( 19 May 2024 10:53 )   PT: 11.60 sec;   INR: 1.02 ratio         PTT - ( 19 May 2024 10:53 )  PTT:27.0 sec

## 2024-05-19 NOTE — H&P ADULT - ASSESSMENT
This 88y Female with PMH of CABG in 1995 not on any treatment, presented with Rt side weakness that started yesterday. CTH showed no acute findings, CTP no mismatch, CTA no LVO. 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, semioval     Neuro  #Stroke workup  - Load with aspirin 325 mg, clopidogrel 300 mg once   - continue aspirin 81mg and plavix 75mg daily  - continue atorvastatin 80mg daily  - q4hr stroke neuro checks and vitals  - Stroke Code HCT Results: reviewed   - Stroke Code CTA Results: reviewed   - Stroke education    Cards  #  - permissive hypertension, Goal -220  - obtain TTE  - Stroke Code EKG Results: reviewed     #HLD  - high dose statin as above in CVA  - LDL results: pending     Pulm  - call provider if SPO2 < 94%  - HOB >30 degree    GI  #Nutrition/Fluids/Electrolytes   - replete K<4 and Mg <2  - Diet: pending swallow assessement   - IVF: none     Renal  - Daily BMP    Infectious Disease  - Stroke Code CXR results:     Endocrine  #  - A1C results: pending     - TSH results: pending     DVT Prophylaxis  - lovenox sq for DVT prophylaxis   - SCDs for DVT prophylaxis          Discussed daily hospital plans and goals with patient at bedside.

## 2024-05-19 NOTE — ED ADULT NURSE REASSESSMENT NOTE - NS ED NURSE REASSESS COMMENT FT1
patient transported to MRI by transport.  Patient placed in gown and all metal removed, MRI sheet filled out. Patient in stable condition and nad.

## 2024-05-19 NOTE — ED PROVIDER NOTE - CLINICAL SUMMARY MEDICAL DECISION MAKING FREE TEXT BOX
ct scans neg, seen by neuro, cleared by tele stroke, neuro recommends obs for mri.    Labs and EKG were ordered and reviewed.  Imaging was ordered and reviewed by me.  Appropriate medications for patient's presenting complaints were ordered and effects were reassessed.  Patient's records (prior hospital, ED visit, and/or nursing home notes if available) were reviewed.  Additional history was obtained from EMS, family, and/or PCP (where available).  Escalation to admission/observation was considered.  Patient requires observation for MRI. ct scans neg, seen by neuro, cleared by tele stroke, neuro recommends obs for mri. signed out to Dr Westbrook    Labs and EKG were ordered and reviewed.  Imaging was ordered and reviewed by me.  Appropriate medications for patient's presenting complaints were ordered and effects were reassessed.  Patient's records (prior hospital, ED visit, and/or nursing home notes if available) were reviewed.  Additional history was obtained from EMS, family, and/or PCP (where available).  Escalation to admission/observation was considered.  Patient requires observation for MRI.

## 2024-05-19 NOTE — ED ADULT NURSE NOTE - NSFALLHARMRISKINTERV_ED_ALL_ED

## 2024-05-19 NOTE — ED CDU PROVIDER INITIAL DAY NOTE - NEURO NEGATIVE STATEMENT, MLM
no loss of consciousness, no gait abnormality, no headache, no sensory deficits, and + right sided  weakness.

## 2024-05-19 NOTE — ED ADULT NURSE NOTE - CODE STROKE DETAILS
Stroke note called by provider. Pt taken to CT. IV placed, labs drawn. CT completed. Placed on cardiac monitor. Fluids started per provider order

## 2024-05-19 NOTE — ED CDU PROVIDER INITIAL DAY NOTE - PROGRESS NOTE DETAILS
MRI shows acute infarct.  Neurology has been notified who evaluated the patient again and request patient to be admitted to stroke unit.  Patient is aware of the plan.

## 2024-05-19 NOTE — ED CDU PROVIDER DISPOSITION NOTE - ATTENDING CONTRIBUTION TO CARE
88 yr old f w/ a pmh significant for cad s/p CABG who presents with R sided weakness. pt on initial imaging with severe lumbar stenosis. pt placed in obs for MRI. On MRI, acute infact of the L corona/putamen. Pt to be admitted to stroke for further evaluation.

## 2024-05-19 NOTE — H&P ADULT - NSHPPHYSICALEXAM_GEN_ALL_CORE
Cognitive:  Orientation, language, memory and knowledge screens intact.    Cranial Nerves:  II: Full to confrontation. III/IV/VI: PERRL EOMF No nystagmus  V1V2V3: Symmetric, VII: Face appears symmetric VIII: Normal to screening, IX/X: Palate Elevates Symmetrical  XI: Trapezius Symmetric  XII: Tongue midline  Motor:  Power: 5/5 Lt, 4/5 Rt, tone: normal x 4 limbs, no tremor   Sensation:  Intact to light touch. Intact to pinprick/temperature and vibration. No neglect  Coordination: Finger-nose-finger intact.  Reflexes:  DTR: 3+ Rt, +2 Lt, no clonus  Plantar responses: Down bilaterally

## 2024-05-19 NOTE — CONSULT NOTE ADULT - ASSESSMENT
88y Female with PMHx of of CAD status post CABG presented to ED with complaints of sudden onset right sided weakness beginning last night at 10pm. Pt states that the right side of her body felt "off", she has never experienced this. This morning, pt woke up with generalized weakness that was nonlocalized which caused her to feel weak while standing, in which she had to sit. Pt denies right sided weakness at this time, but states she feels weak all over. Pt endorses fall last week in which she hit her head slightly. Stroke code called in ED. /91 in triage. CTH negative, CTP negative, CTA head and neck showing mild stenosis. NIHSS 0, however RUE/RLE 4/5 strength on exam. No TNK as pt is out of the window.     Impression: 87 y/o F with subtle right sided weakness with sudden onset last night and mild stenosis on CTA head and neck. Etiology of presentation unclear,  88y Female with PMHx of severe lumbar stenosis (pacs images of MR lumbar spine 2020), CAD status post CABG presented to ED with complaints of sudden onset right sided weakness beginning last night at 10pm. Pt states that the right side of her body felt "off", she has never experienced this. This morning, pt woke up with generalized weakness that was nonlocalized which caused her to feel weak while standing, in which she had to sit. Pt denies right sided weakness at this time, but states she feels weak all over. Pt endorses fall last week in which she hit her head slightly. Stroke code called in ED. /91 in triage. CTH negative, CTP negative, CTA head and neck showing mild stenosis. NIHSS 0, however RUE/RLE 4/5 strength on exam. No TNK as pt is out of the window.     Impression: 89 y/o F with hx of severe lumbar stenosis presenting today with subtle right sided weakness, sudden onset last night, and mild stenosis on CTA head and neck. Etiology of presentation unclear and further imaging required to rule put neurovascular cause.     RECS:  -Obs for MRI brain non con and MR c spine, if imaging negative, pt can be d/c with neurology f/u outpatient   -Medical Management and dispo per primary team    Discussed with Dr. Figueroa, will be seen by Dr. Morrell in the AM

## 2024-05-19 NOTE — H&P ADULT - HISTORY OF PRESENT ILLNESS
This 88y old female with PMH of CABG presented to ED with complaints of sudden onset right sided weakness beginning last night at 10pm. Pt states that the right side of her body felt "off", she has never experienced this. This morning, pt woke up with generalized weakness that was nonlocalized which caused her to feel weak while standing, in which she had to sit. Pt endorses fall last week in which she hit her head slightly. Stroke code called in ED. /91 in triage. Denies recent illness, visual changes, tingling or numbness in extremities.

## 2024-05-19 NOTE — ED CDU PROVIDER INITIAL DAY NOTE - LIVES WITH, PROFILE
children Transposition Flap Text: The defect edges were debeveled with a #15 scalpel blade.  Given the location of the defect and the proximity to free margins a transposition flap was deemed most appropriate.  Using a sterile surgical marker, an appropriate transposition flap was drawn incorporating the defect.    The area thus outlined was incised deep to adipose tissue with a #15 scalpel blade.  The skin margins were undermined to an appropriate distance in all directions utilizing iris scissors.

## 2024-05-19 NOTE — H&P ADULT - ATTENDING COMMENTS
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 88 year old woman w/ PMH of CABG in , presented w/ RHP, found to have L. CR infarction.     MR brain L. CR acute infarction  CTA h/n mild multifocal stenosis, including the L. 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 Mechanism likely .     Plan:   Continue DAPT for 3w followed by ASA 81mg indefinitely   TTE  ILR per STROKE AF   PT

## 2024-05-19 NOTE — ED PROVIDER NOTE - PHYSICAL EXAMINATION
VITAL SIGNS: I have reviewed nursing notes and confirm.  CONSTITUTIONAL: Well-developed; well-nourished; in no acute distress.  SKIN: Skin exam is warm and dry, no acute rash.  HEAD: Normocephalic; atraumatic.  EYES: PERRL, EOM intact; conjunctiva and sclera clear.  ENT: airway clear.   NECK: Supple  CARD: S1, S2 normal; no murmurs, gallops, or rubs. Regular rate and rhythm.  RESP: No wheezes, rales or rhonchi.  ABD: Normal bowel sounds; soft; non-distended; non-tender  EXT: Normal ROM.   NEURO: Alert, oriented. CN 2-12 intact. Strength 4/5 in right upper and lower extremities, compared to left, which is 5/5. Sensation intact in bilateral upper and lower extremities. Finger to nose intact, although slower on the right. Right sided pronator drift. Gait stable.  PSYCH: Cooperative, appropriate.

## 2024-05-19 NOTE — ED PROVIDER NOTE - OBJECTIVE STATEMENT
88-year-old female with history of CAD status post CABG presents to ED with complaints of right sided weakness beginning last night.  Patient states that around 10 PM last night she felt like her right arm did not feel right, felt weak.  This morning after taking a shower she felt generalized weakness and had to sit down.  She was able to crawl to get her son's attention and son picked her up and put her on the couch.  Patient still reports weakness of her right arm.  She denies any numbness or tingling, blurry vision, lightheadedness, pain, headache, chest pain, or difficulty breathing.  Patient was recently on vacation in a different state, and a small fall and hit to head approximately 1 week ago, has been okay without any headache or amatory difficulties since.  Patient was able to ambulate in the ED.  Stroke code called in ED.

## 2024-05-20 LAB
A1C WITH ESTIMATED AVERAGE GLUCOSE RESULT: 5.7 % — HIGH (ref 4–5.6)
ANION GAP SERPL CALC-SCNC: 13 MMOL/L — SIGNIFICANT CHANGE UP (ref 7–14)
APPEARANCE UR: CLEAR — SIGNIFICANT CHANGE UP
BILIRUB UR-MCNC: NEGATIVE — SIGNIFICANT CHANGE UP
BUN SERPL-MCNC: 9 MG/DL — LOW (ref 10–20)
CALCIUM SERPL-MCNC: 9.3 MG/DL — SIGNIFICANT CHANGE UP (ref 8.4–10.5)
CHLORIDE SERPL-SCNC: 106 MMOL/L — SIGNIFICANT CHANGE UP (ref 98–110)
CHOLEST SERPL-MCNC: 209 MG/DL — HIGH
CO2 SERPL-SCNC: 22 MMOL/L — SIGNIFICANT CHANGE UP (ref 17–32)
COLOR SPEC: YELLOW — SIGNIFICANT CHANGE UP
CREAT SERPL-MCNC: 0.6 MG/DL — LOW (ref 0.7–1.5)
DIFF PNL FLD: NEGATIVE — SIGNIFICANT CHANGE UP
EGFR: 87 ML/MIN/1.73M2 — SIGNIFICANT CHANGE UP
ESTIMATED AVERAGE GLUCOSE: 117 MG/DL — HIGH (ref 68–114)
GLUCOSE SERPL-MCNC: 100 MG/DL — HIGH (ref 70–99)
GLUCOSE UR QL: NEGATIVE MG/DL — SIGNIFICANT CHANGE UP
HDLC SERPL-MCNC: 69 MG/DL — SIGNIFICANT CHANGE UP
KETONES UR-MCNC: NEGATIVE MG/DL — SIGNIFICANT CHANGE UP
LEUKOCYTE ESTERASE UR-ACNC: NEGATIVE — SIGNIFICANT CHANGE UP
LIPID PNL WITH DIRECT LDL SERPL: 106 MG/DL — HIGH
NITRITE UR-MCNC: NEGATIVE — SIGNIFICANT CHANGE UP
NON HDL CHOLESTEROL: 140 MG/DL — HIGH
PH UR: 6.5 — SIGNIFICANT CHANGE UP (ref 5–8)
POTASSIUM SERPL-MCNC: 3.8 MMOL/L — SIGNIFICANT CHANGE UP (ref 3.5–5)
POTASSIUM SERPL-SCNC: 3.8 MMOL/L — SIGNIFICANT CHANGE UP (ref 3.5–5)
PROT UR-MCNC: NEGATIVE MG/DL — SIGNIFICANT CHANGE UP
SODIUM SERPL-SCNC: 141 MMOL/L — SIGNIFICANT CHANGE UP (ref 135–146)
SP GR SPEC: >1.03 — HIGH (ref 1–1.03)
TRIGL SERPL-MCNC: 171 MG/DL — HIGH
TSH SERPL-MCNC: 1.79 UIU/ML — SIGNIFICANT CHANGE UP (ref 0.27–4.2)
UROBILINOGEN FLD QL: 0.2 MG/DL — SIGNIFICANT CHANGE UP (ref 0.2–1)

## 2024-05-20 PROCEDURE — 99233 SBSQ HOSP IP/OBS HIGH 50: CPT

## 2024-05-20 RX ORDER — LISINOPRIL 2.5 MG/1
5 TABLET ORAL DAILY
Refills: 0 | Status: DISCONTINUED | OUTPATIENT
Start: 2024-05-20 | End: 2024-05-21

## 2024-05-20 RX ADMIN — Medication 81 MILLIGRAM(S): at 11:58

## 2024-05-20 RX ADMIN — ATORVASTATIN CALCIUM 80 MILLIGRAM(S): 80 TABLET, FILM COATED ORAL at 21:48

## 2024-05-20 RX ADMIN — LISINOPRIL 5 MILLIGRAM(S): 2.5 TABLET ORAL at 11:58

## 2024-05-20 RX ADMIN — CLOPIDOGREL BISULFATE 75 MILLIGRAM(S): 75 TABLET, FILM COATED ORAL at 11:58

## 2024-05-20 RX ADMIN — ENOXAPARIN SODIUM 40 MILLIGRAM(S): 100 INJECTION SUBCUTANEOUS at 23:50

## 2024-05-20 NOTE — PATIENT PROFILE ADULT - FALL HARM RISK - HARM RISK INTERVENTIONS

## 2024-05-20 NOTE — OCCUPATIONAL THERAPY INITIAL EVALUATION ADULT - LEVEL OF INDEPENDENCE: DRESS LOWER BODY, OT EVAL
Pants: unable to perform 2/2 fatigue. Socks: supervision with set up with increased time due to weakened pincher  based on observation.

## 2024-05-20 NOTE — PHYSICAL THERAPY INITIAL EVALUATION ADULT - HEALTH SCREEN CRITERIA
-- DO NOT REPLY / DO NOT REPLY ALL --  -- Message is from Engagement Center Operations ([facility]) --    General Patient Message: Patient is reaching out stating he has relocated and he will now be switching doctors to be closer to his new home. No need to send notifications stating he is past due for an appointment as he has a new doctor he is seeing.    Caller Information       Type Contact Phone/Fax    05/09/2023 10:49 AM CDT Phone (Incoming) Anam Ibarra (Self) [phone] ()        Alternative phone number: None    Can a detailed message be left? No    Message Turnaround: [place]:    Refer to site's KB page for routing instructions    Please give this turnaround time to the caller:   \"You can expect to receive a response 2-3 business days after your provider's clinical team reviews the message\"              
Patient chart updated.  
yes

## 2024-05-20 NOTE — OCCUPATIONAL THERAPY INITIAL EVALUATION ADULT - GENERAL OBSERVATIONS, REHAB EVAL
Pt received and left semifowler in ED [facility]. + IV lock, + pulse ox, + cardiac monitor.

## 2024-05-20 NOTE — OCCUPATIONAL THERAPY INITIAL EVALUATION ADULT - BED MOBILITY TRAINING, PT EVAL
In one week, pt will demonstrate rolling to left with supervision with use of bed rail. In one week, pt will demonstrate sup<>sit with close supervision.

## 2024-05-20 NOTE — PHYSICAL THERAPY INITIAL EVALUATION ADULT - ADDITIONAL COMMENTS
Pt lives alone in home with 1 steps to enter, 1 step inside, 10 steps to attic (pt "monkey climbs" up attic stairs). Pt ambulates independently, reports one slip ~1 week ago and hitting head on refrigerator.

## 2024-05-20 NOTE — SWALLOW BEDSIDE ASSESSMENT ADULT - PHARYNGEAL PHASE
No Medication Refill Protocol on file:    Medication/Dose: Fioricet   Patient last seen by PCP: 10-3-22  Next office visit with PCP: 2-3-23  Last Lab:7-1-22  Last Refill: 6-24-22 60 tabs 3 refills    Above information requires contacting patient: No    Prescription does not require PDMP check    Sent to provider to approve or deny      
Within functional limits

## 2024-05-20 NOTE — OCCUPATIONAL THERAPY INITIAL EVALUATION ADULT - FINE MOTOR COORDINATION TRAINING, OT EVAL
In one week, pt will demonstrate fine motor coordination to mildly impaired, enabling increased independence in ADLs.

## 2024-05-20 NOTE — OCCUPATIONAL THERAPY INITIAL EVALUATION ADULT - NSACTIVITYREC_GEN_A_OT
Initiated pt education on purchase and installation of wall grab bars and long handled sponge. As per pt report, a shower chair may be stored in attic. Educated pt, pt is not to go into attic due to decrease risk of falls when climbing steps. As per pt report, was using "monkey climb" on attic steps. Additionally, as per pt report, in home environment, pt stands and places foot on chair to don socks. Pt educated to no longer use this method due to risk of falls. Pt verbalized fair plus understanding of above.

## 2024-05-20 NOTE — OCCUPATIONAL THERAPY INITIAL EVALUATION ADULT - LEVEL OF INDEPENDENCE: SIT/STAND, REHAB EVAL
initially, supervision. When performing return to bed, demonstrated contact guard assist for sit to stand due to fatigue.

## 2024-05-20 NOTE — PHYSICAL THERAPY INITIAL EVALUATION ADULT - GENERAL OBSERVATIONS, REHAB EVAL
[id number] Pt received and left semifowlers on stretcher, NAD, pt agreeable to PT session, son present throughout, +tele +SpO2

## 2024-05-20 NOTE — PHYSICAL THERAPY INITIAL EVALUATION ADULT - PERTINENT HX OF CURRENT PROBLEM, REHAB EVAL
88y old female with PMH of CABG presented to ED with complaints of sudden onset right sided weakness beginning last night at 10pm. Pt states that the right side of her body felt "off", she has never experienced this. This morning, pt woke up with generalized weakness that was nonlocalized which caused her to feel weak while standing, in which she had to sit. Pt endorses fall last week in which she hit her head slightly. Stroke code called in ED. /91 in triage. Denies recent illness, visual changes, tingling or numbness in extremities.

## 2024-05-20 NOTE — OCCUPATIONAL THERAPY INITIAL EVALUATION ADULT - ADDITIONAL COMMENTS
As per pt report, resides in private house with 1 step to enter, 1 step inside. No HR. Flight to attic where items are stored. Occasionally, goes to attic. Stopped driving a while ago.

## 2024-05-20 NOTE — SWALLOW BEDSIDE ASSESSMENT ADULT - SLP PERTINENT HISTORY OF CURRENT PROBLEM
89yo Female with PMH of CABG in 1995 not on any treatment. Presented with Right side weakness. STroke code in ED, out of window for TNK.CTH showed no acute findings, CTP no mismatch, CTA no LVO. 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, semioval. 87yo Female with PMH of CABG in 1995 not on any treatment. Presented with Right side weakness. Stroke code in ED, out of window for TNK.CTH showed no acute findings, CTP no mismatch, CTA no LVO. 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, semioval.

## 2024-05-20 NOTE — CONSULT NOTE ADULT - SUBJECTIVE AND OBJECTIVE BOX
**STROKE CODE CONSULT NOTE**    Last known well time/Time of onset of symptoms: 10 pm yesterday     HPI: 88y Female with PMHx of of CAD status post CABG presented to ED with complaints of sudden onset right sided weakness beginning last night at 10pm. Pt states that the right side of her body felt "off", she has never experienced this. This morning, pt woke up with generalized weakness that was nonlocalized which caused her to feel weak while standing, in which she had to sit. Pt denies right sided weakness at this time, but states she feels weak all over. Pt endorses fall last week in which she hit her head slightly. Stroke code called in ED. /91 in triage. Denies recent illness, visual changes, tingling or numbness in extremities.     T(C): 36.9 (05-19-24 @ 10:30), Max: 36.9 (05-19-24 @ 09:19)  HR: 64 (05-19-24 @ 10:30) (61 - 64)  BP: 201/91 (05-19-24 @ 10:30) (179/91 - 201/91)  RR: 18 (05-19-24 @ 10:30) (18 - 19)  SpO2: 96% (05-19-24 @ 10:30) (96% - 96%)    PAST MEDICAL & SURGICAL HISTORY:  CAD (coronary artery disease)          FAMILY HISTORY:      SOCIAL HISTORY:  Denies smoking, drinking, or drug use    ROS: as per HPI     MEDICATIONS  (STANDING):    MEDICATIONS  (PRN):    Home Medications:      Allergies    No Known Allergies    Intolerances      Vital Signs Last 24 Hrs  T(C): 36.9 (19 May 2024 10:30), Max: 36.9 (19 May 2024 09:19)  T(F): 98.5 (19 May 2024 10:30), Max: 98.5 (19 May 2024 09:19)  HR: 64 (19 May 2024 10:30) (61 - 64)  BP: 201/91 (19 May 2024 10:30) (179/91 - 201/91)  BP(mean): --  RR: 18 (19 May 2024 10:30) (18 - 19)  SpO2: 96% (19 May 2024 10:30) (96% - 96%)    Parameters below as of 19 May 2024 10:30  Patient On (Oxygen Delivery Method): room air        Physical exam:  General: No acute distress, awake and alert  Cardiovascular: Regular rate and rhythm, no murmurs, rubs, or gallops. No bruits  Pulmonary: Anterior breath sounds clear bilaterally, no crackles or wheezing. No use of accessory muscles  Extremities: Radial and DP pulses +2, no edema    Neurologic:  -Mental status: Awake, alert, oriented to person, place, and time. Speech is fluent with intact naming, repetition, and comprehension, no dysarthria. Recent and remote memory intact. Follows commands. Attention/concentration intact. Fund of knowledge appropriate.  -Cranial nerves:   II: Visual fields are full to confrontation.  III, IV, VI: Extraocular movements are intact without nystagmus. Pupils equally round and reactive to light  V:  Facial sensation V1-V3 equal and intact   VII: Face is symmetric with normal eye closure and smile  Motor: Normal bulk and tone. No pronator drift. RUE/RLE 4/5, LUE/LLE 5/5.   Sensation: Intact to light touch bilaterally. No neglect or extinction on double simultaneous testing.  Coordination: No dysmetria on finger-to-nose and heel-to-shin bilaterally  Reflexes: Downgoing toes bilaterally   Gait: Narrow gait and steady    NIHSS: 0    Fingerstick Blood Glucose: CAPILLARY BLOOD GLUCOSE        LABS:                        13.4   6.92  )-----------( 248      ( 19 May 2024 10:53 )             40.7           PT/INR - ( 19 May 2024 10:53 )   PT: 11.60 sec;   INR: 1.02 ratio         PTT - ( 19 May 2024 10:53 )  PTT:27.0 sec          RADIOLOGY & ADDITIONAL STUDIES:    HCT: < from: CT Brain Stroke Protocol (05.19.24 @ 10:47) >  IMPRESSION:  No evidence of acute transcortical infarct, acute intracranial   hemorrhage, or mass effect.    < end of copied text >      CTA: < from: CT Angio Brain Stroke Protocol  w/ IV Cont (05.19.24 @ 11:14) >  IMPRESSION:    CT PERFUSION:  No perfusion deficits to suggest areas of completed infarction or at risk   territory.    CTA HEAD/NECK:  No large vessel occlusion, aneurysm, or vascular malformation.    Mild stenosis of the right supraclinoid segment of the ICA, right PCA P2   segment. and distal branch of the left M2 MCA segment.    < end of copied text >    Reason IV-tenecetplase not given: out of window             
HPI:  This 88y old female with PMH of CABG presented to ED with complaints of sudden onset right sided weakness beginning last night at 10pm. Pt states that the right side of her body felt "off", she has never experienced this. This morning, pt woke up with generalized weakness that was nonlocalized which caused her to feel weak while standing, in which she had to sit. Pt endorses fall last week in which she hit her head slightly. Stroke code called in ED. /91 in triage. Denies recent illness, visual changes, tingling or numbness in extremities.  (19 May 2024 23:27)      PAST MEDICAL & SURGICAL HISTORY:  CAD (coronary artery disease)      Known health problems: none          Hospital Course:    TODAY'S SUBJECTIVE & REVIEW OF SYMPTOMS:     Constitutional WNL   Cardio WNL   Resp WNL   GI WNL  Heme WNL  Endo WNL  Skin WNL  MSK WNL  Neuro WNL  Cognitive WNL  Psych WNL      MEDICATIONS  (STANDING):  aspirin enteric coated 81 milliGRAM(s) Oral daily  atorvastatin 80 milliGRAM(s) Oral at bedtime  clopidogrel Tablet 75 milliGRAM(s) Oral daily  enoxaparin Injectable 40 milliGRAM(s) SubCutaneous every 24 hours  lisinopril 5 milliGRAM(s) Oral daily    MEDICATIONS  (PRN):      FAMILY HISTORY:  FH: HTN (hypertension) (Father, Mother)        Allergies    No Known Allergies    Intolerances        SOCIAL HISTORY:    [  ] Etoh  [  ] Smoking  [  ] Substance abuse     Home Environment:  [ x ] Home Alone  [  ] Lives with Family  [  ] Home Health Aid    Dwelling:  [  ] Apartment  [ x ] Private House  [  ] Adult Home  [  ] Skilled Nursing Facility      [  ] Short Term  [  ] Long Term  [X  ] Stairs- 1 AYDEN, , 1 step inside       Elevator [  ]    FUNCTIONAL STATUS PTA: (Check all that apply)  Ambulation: [ x  ]Independent    [  ] Dependent     [  ] Non-Ambulatory  Assistive Device: [  ] SA Cane  [  ]  Q Cane  [  ] Walker  [  ]  Wheelchair  ADL : [  x] Independent  [  ]  Dependent       Vital Signs Last 24 Hrs  T(C): 36.7 (20 May 2024 18:05), Max: 36.8 (19 May 2024 21:54)  T(F): 98.1 (20 May 2024 18:05), Max: 98.3 (19 May 2024 21:54)  HR: 58 (20 May 2024 18:05) (54 - 74)  BP: 167/81 (20 May 2024 18:05) (144/68 - 209/95)  BP(mean): 110 (20 May 2024 18:05) (110 - 110)  RR: 18 (20 May 2024 18:05) (18 - 18)  SpO2: 97% (20 May 2024 18:05) (96% - 100%)    Parameters below as of 20 May 2024 18:05  Patient On (Oxygen Delivery Method): room air          PHYSICAL EXAM: Alert & Oriented X 3  GENERAL: NAD, well-groomed, well-developed  HEAD:  Atraumatic, Normocephalic  EYES: EOMI, PERRLA, conjunctiva and sclera clear  NECK: Supple, No JVD  L: CTA bilaterally; No rales, rhonchi, wheezing, or rubs  HEART: Regular rate and rhythm; No murmurs, rubs, or gallops  ABDOMEN: Soft, Nontender, Nondistended; Bowel sounds present  EXTREMITIES:  2+ Peripheral Pulses, No clubbing, cyanosis, or edema    NERVOUS SYSTEM:  Cranial Nerves 2-12 intact [  ] Abnormal  [  ]  ROM: WFL all extremities [  ]  Abnormal [  ]  Motor Strength: WFL all extremities  [  ]  Abnormal [x  ] 5-/5 right side  Sensation: intact to light touch [  ] Abnormal [  ]  Reflexes: Symmetric [  ]  Abnormal [  ]    FUNCTIONAL STATUS:  Bed Mobility: Independent [  ]  Supervision [  ]  Needs Assistance [  ]  N/A [  ]  Transfers: Independent [  ]  Supervision [  ]  Needs Assistance [  ]  N/A [  ]   Ambulation: Independent [  ]  Supervision [  ]  Needs Assistance [  ]  N/A [  ]  ADL: Independent [  ] Requires Assistance [  ] N/A [  ]      LABS:                        13.4   6.92  )-----------( 248      ( 19 May 2024 10:53 )             40.7     05-20    141  |  106  |  9<L>  ----------------------------<  100<H>  3.8   |  22  |  0.6<L>    Ca    9.3      20 May 2024 07:23    TPro  7.0  /  Alb  4.5  /  TBili  0.6  /  DBili  x   /  AST  28  /  ALT  25  /  AlkPhos  87  05-19    PT/INR - ( 19 May 2024 10:53 )   PT: 11.60 sec;   INR: 1.02 ratio         PTT - ( 19 May 2024 10:53 )  PTT:27.0 sec  Urinalysis Basic - ( 20 May 2024 07:23 )    Color: x / Appearance: x / SG: x / pH: x  Gluc: 100 mg/dL / Ketone: x  / Bili: x / Urobili: x   Blood: x / Protein: x / Nitrite: x   Leuk Esterase: x / RBC: x / WBC x   Sq Epi: x / Non Sq Epi: x / Bacteria: x        RADIOLOGY & ADDITIONAL STUDIES:    Assesment:

## 2024-05-20 NOTE — CONSULT NOTE ADULT - ASSESSMENT
IMPRESSION: Rehab of left putamen stroke. She has a right hemiparesis. She amb 100 ft with cg assist. She needs help with ADLs.     PRECAUTIONS: [ x ] Cardiac  [  ] Respiratory  [  ] Seizures [  ] Contact Isolation  [  ] Droplet Isolation  [  ] Other    Weight Bearing Status:     RECOMMENDATION:    Out of Bed to Chair     DVT/Decubiti Prophylaxis    REHAB PLAN:     [  x ] Bedside P/T 3-5 times a week   [ x  ]   Bedside O/T  2-3 times a week             [   ] No Rehab Therapy Indicated                   [   ]  Speech Therapy   Conditioning/ROM                                    ADL  Bed Mobility                                               Conditioning/ROM  Transfers                                                     Bed Mobility  Sitting /Standing Balance                         Transfers                                        Gait Training                                               Sitting/Standing Balance  Stair Training [   ]Applicable                    Home equipment Eval                                                                        Splinting  [   ] Only      GOALS:   ADL   [ x  ]   Independent                    Transfers  [ x  ] Independent                          Ambulation  [ x  ] Independent     [  x  ] With device                            [   ]  CG                                                         [   ]  CG                                                                  [   ] CG                            [    ] Min A                                                   [   ] Min A                                                              [   ] Min  A          DISCHARGE PLAN:   [   ]  Good candidate for Intensive Rehabilitation/Hospital based-4A [facility]                                             Will tolerate 3hrs Intensive Rehab Daily                                       [    ]  Short Term Rehab in Skilled Nursing Facility                                       [  xx  ]  To be determined; I reviewed the PT and OT evals.                                             [    ]  Possible Candidate for Intensive Hospital based Rehab

## 2024-05-20 NOTE — PROGRESS NOTE ADULT - ASSESSMENT
This 88y Female with PMH of CABG in 1995 not on any treatment, presented with Rt side weakness that started yesterday. CTH showed no acute findings, CTP no mismatch, CTA no LVO. 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, semioval     Neuro  #Stroke workup  - Load with aspirin 325 mg, clopidogrel 300 mg once   - continue aspirin 81mg and plavix 75mg daily  - continue atorvastatin 80mg daily  - q4hr stroke neuro checks and vitals  - Stroke Code HCT Results: reviewed   - Stroke Code CTA Results: reviewed   - Stroke education    Cards  #  - permissive hypertension, Goal -220  - obtain TTE  - Stroke Code EKG Results: reviewed     #HLD  - high dose statin as above in CVA  - LDL results: pending     Pulm  - call provider if SPO2 < 94%  - HOB >30 degree    GI  #Nutrition/Fluids/Electrolytes   - replete K<4 and Mg <2  - Diet: pending swallow assessement   - IVF: none     Renal  - Daily BMP    Infectious Disease  - Stroke Code CXR results:     Endocrine  #  - A1C results: pending     - TSH results: pending     DVT Prophylaxis  - lovenox sq for DVT prophylaxis   - SCDs for DVT prophylaxis

## 2024-05-20 NOTE — OCCUPATIONAL THERAPY INITIAL EVALUATION ADULT - ADL RETRAINING, OT EVAL
Reassess don/doff pants when appropriate. In one week, pt will demonstrate grooming, in sitting, with min assist.

## 2024-05-20 NOTE — SWALLOW BEDSIDE ASSESSMENT ADULT - COMMENTS
Language screen completed. Pt presents with preserved expressive receptive language during structured/ unstructured conversation exchanges, preserved STM during 5 minute delay recall task.

## 2024-05-20 NOTE — OCCUPATIONAL THERAPY INITIAL EVALUATION ADULT - RANGE OF MOTION EXAMINATION, UPPER EXTREMITY
Initially, demonstrated decreased AROM R shoulder flexion at 0-100*. When asked to repeat movement, it was WFL 0-120*./bilateral UE Active ROM was WFL  (within functional limits)

## 2024-05-20 NOTE — ED ADULT NURSE REASSESSMENT NOTE - NS ED NURSE REASSESS COMMENT FT1
Report received from RN. Pt assessed. Pt A&Ox4. Pt has no complaints of pain at this time. Pt NIH 0. Updated on plan of care. Plan of care ongoing. Will update as needed.

## 2024-05-20 NOTE — OCCUPATIONAL THERAPY INITIAL EVALUATION ADULT - SHORT TERM MEMORY, REHAB EVAL
mild. Recalled 2/3 items s/p immediate recall and s/p 5 minute delay. Unable to recall 3rd item when presented with 1 general vc or choices./impaired

## 2024-05-21 ENCOUNTER — TRANSCRIPTION ENCOUNTER (OUTPATIENT)
Age: 89
End: 2024-05-21

## 2024-05-21 VITALS — HEART RATE: 72 BPM | DIASTOLIC BLOOD PRESSURE: 79 MMHG | SYSTOLIC BLOOD PRESSURE: 145 MMHG

## 2024-05-21 LAB
ALBUMIN SERPL ELPH-MCNC: 4 G/DL — SIGNIFICANT CHANGE UP (ref 3.5–5.2)
ALP SERPL-CCNC: 77 U/L — SIGNIFICANT CHANGE UP (ref 30–115)
ALT FLD-CCNC: 21 U/L — SIGNIFICANT CHANGE UP (ref 0–41)
ANION GAP SERPL CALC-SCNC: 11 MMOL/L — SIGNIFICANT CHANGE UP (ref 7–14)
AST SERPL-CCNC: 30 U/L — SIGNIFICANT CHANGE UP (ref 0–41)
BASOPHILS # BLD AUTO: 0.05 K/UL — SIGNIFICANT CHANGE UP (ref 0–0.2)
BASOPHILS NFR BLD AUTO: 0.7 % — SIGNIFICANT CHANGE UP (ref 0–1)
BILIRUB SERPL-MCNC: 0.6 MG/DL — SIGNIFICANT CHANGE UP (ref 0.2–1.2)
BUN SERPL-MCNC: 11 MG/DL — SIGNIFICANT CHANGE UP (ref 10–20)
CALCIUM SERPL-MCNC: 9.2 MG/DL — SIGNIFICANT CHANGE UP (ref 8.4–10.5)
CHLORIDE SERPL-SCNC: 105 MMOL/L — SIGNIFICANT CHANGE UP (ref 98–110)
CO2 SERPL-SCNC: 22 MMOL/L — SIGNIFICANT CHANGE UP (ref 17–32)
CREAT SERPL-MCNC: 0.7 MG/DL — SIGNIFICANT CHANGE UP (ref 0.7–1.5)
EGFR: 84 ML/MIN/1.73M2 — SIGNIFICANT CHANGE UP
EOSINOPHIL # BLD AUTO: 0.29 K/UL — SIGNIFICANT CHANGE UP (ref 0–0.7)
EOSINOPHIL NFR BLD AUTO: 4.2 % — SIGNIFICANT CHANGE UP (ref 0–8)
GLUCOSE SERPL-MCNC: 107 MG/DL — HIGH (ref 70–99)
HCT VFR BLD CALC: 37.9 % — SIGNIFICANT CHANGE UP (ref 37–47)
HGB BLD-MCNC: 12.9 G/DL — SIGNIFICANT CHANGE UP (ref 12–16)
IMM GRANULOCYTES NFR BLD AUTO: 0.3 % — SIGNIFICANT CHANGE UP (ref 0.1–0.3)
LYMPHOCYTES # BLD AUTO: 0.69 K/UL — LOW (ref 1.2–3.4)
LYMPHOCYTES # BLD AUTO: 9.9 % — LOW (ref 20.5–51.1)
MAGNESIUM SERPL-MCNC: 2 MG/DL — SIGNIFICANT CHANGE UP (ref 1.8–2.4)
MCHC RBC-ENTMCNC: 31.4 PG — HIGH (ref 27–31)
MCHC RBC-ENTMCNC: 34 G/DL — SIGNIFICANT CHANGE UP (ref 32–37)
MCV RBC AUTO: 92.2 FL — SIGNIFICANT CHANGE UP (ref 81–99)
MONOCYTES # BLD AUTO: 0.58 K/UL — SIGNIFICANT CHANGE UP (ref 0.1–0.6)
MONOCYTES NFR BLD AUTO: 8.3 % — SIGNIFICANT CHANGE UP (ref 1.7–9.3)
NEUTROPHILS # BLD AUTO: 5.35 K/UL — SIGNIFICANT CHANGE UP (ref 1.4–6.5)
NEUTROPHILS NFR BLD AUTO: 76.6 % — HIGH (ref 42.2–75.2)
NRBC # BLD: 0 /100 WBCS — SIGNIFICANT CHANGE UP (ref 0–0)
PHOSPHATE SERPL-MCNC: 3.9 MG/DL — SIGNIFICANT CHANGE UP (ref 2.1–4.9)
PLATELET # BLD AUTO: 235 K/UL — SIGNIFICANT CHANGE UP (ref 130–400)
PMV BLD: 10 FL — SIGNIFICANT CHANGE UP (ref 7.4–10.4)
POTASSIUM SERPL-MCNC: 3.9 MMOL/L — SIGNIFICANT CHANGE UP (ref 3.5–5)
POTASSIUM SERPL-SCNC: 3.9 MMOL/L — SIGNIFICANT CHANGE UP (ref 3.5–5)
PROT SERPL-MCNC: 6.3 G/DL — SIGNIFICANT CHANGE UP (ref 6–8)
RBC # BLD: 4.11 M/UL — LOW (ref 4.2–5.4)
RBC # FLD: 14.5 % — SIGNIFICANT CHANGE UP (ref 11.5–14.5)
SODIUM SERPL-SCNC: 138 MMOL/L — SIGNIFICANT CHANGE UP (ref 135–146)
WBC # BLD: 6.98 K/UL — SIGNIFICANT CHANGE UP (ref 4.8–10.8)
WBC # FLD AUTO: 6.98 K/UL — SIGNIFICANT CHANGE UP (ref 4.8–10.8)

## 2024-05-21 PROCEDURE — 99239 HOSP IP/OBS DSCHRG MGMT >30: CPT

## 2024-05-21 RX ORDER — ACETAMINOPHEN 500 MG
650 TABLET ORAL ONCE
Refills: 0 | Status: COMPLETED | OUTPATIENT
Start: 2024-05-21 | End: 2024-05-21

## 2024-05-21 RX ORDER — CLOPIDOGREL BISULFATE 75 MG/1
1 TABLET, FILM COATED ORAL
Qty: 20 | Refills: 0
Start: 2024-05-21 | End: 2024-06-09

## 2024-05-21 RX ORDER — LISINOPRIL 2.5 MG/1
5 TABLET ORAL ONCE
Refills: 0 | Status: COMPLETED | OUTPATIENT
Start: 2024-05-21 | End: 2024-05-21

## 2024-05-21 RX ORDER — LISINOPRIL 2.5 MG/1
1 TABLET ORAL
Qty: 30 | Refills: 5
Start: 2024-05-21 | End: 2024-11-16

## 2024-05-21 RX ORDER — ASPIRIN/CALCIUM CARB/MAGNESIUM 324 MG
1 TABLET ORAL
Qty: 30 | Refills: 5
Start: 2024-05-21 | End: 2024-11-16

## 2024-05-21 RX ORDER — ATORVASTATIN CALCIUM 80 MG/1
1 TABLET, FILM COATED ORAL
Qty: 30 | Refills: 5
Start: 2024-05-21 | End: 2024-11-16

## 2024-05-21 RX ORDER — LISINOPRIL 2.5 MG/1
10 TABLET ORAL DAILY
Refills: 0 | Status: DISCONTINUED | OUTPATIENT
Start: 2024-05-21 | End: 2024-05-21

## 2024-05-21 RX ADMIN — Medication 650 MILLIGRAM(S): at 14:50

## 2024-05-21 RX ADMIN — CLOPIDOGREL BISULFATE 75 MILLIGRAM(S): 75 TABLET, FILM COATED ORAL at 11:13

## 2024-05-21 RX ADMIN — LISINOPRIL 5 MILLIGRAM(S): 2.5 TABLET ORAL at 05:26

## 2024-05-21 RX ADMIN — LISINOPRIL 5 MILLIGRAM(S): 2.5 TABLET ORAL at 14:51

## 2024-05-21 RX ADMIN — Medication 81 MILLIGRAM(S): at 11:12

## 2024-05-21 NOTE — DISCHARGE NOTE NURSING/CASE MANAGEMENT/SOCIAL WORK - NSDCPEFALRISK_GEN_ALL_CORE
For information on Fall & Injury Prevention, visit: [email or web address] OR  [email or web address] OR  https://www.cdc.gov/steadi/patient.html

## 2024-05-21 NOTE — DISCHARGE NOTE PROVIDER - HOSPITAL COURSE
Hospital course:  This 88y old female with PMH of CABG presented to ED with complaints of sudden onset right sided weakness beginning last night at 10pm. Pt states that the right side of her body felt "off", she has never experienced this. This morning, pt woke up with generalized weakness that was nonlocalized which caused her to feel weak while standing, in which she had to sit. Pt endorses fall last week in which she hit her head slightly. Stroke code called in ED. /91 in triage. Denies recent illness, visual changes, tingling or numbness in extremities.  (19 May 2024 23:27)    During this hospital course, patient had a ischemic stroke located in left BG, semiovale as seen on MRI.   The stroke etiology is likely secondary to:  []atrial fibrillation  [x] small vessel disease from atherosclerotic risk factors  []other:  []etiology workup still in progress    Patient had the following workup done in house:  CT Head: No acute findings   MR Head Non Contrast: acute stroke in the Lt BG, semi oval  CT Angio Head and neck: No LVO. Mild multifocal stenosis of the right A3 segment (4:301). Mild stenosis of the right P2 segment (13:76). Mild stenosis of a distal branch of the left M2 segment (4:294). The visualized cerebral arteries are otherwise unremarkable. Fetal origin of the right posterior cerebral artery.  []echo TTE note was not done here, plan to get it o/p  []labs 5.7 hba1c, ldl 106  []other    Physical exam at discharge:  Physical Exam: Cognitive:  Orientation, language, memory and knowledge screens intact.  Cranial Nerves:  II: Full to confrontation. III/IV/VI: PERRL EOMF No nystagmus  V1V2V3: Symmetric, VII: Face appears symmetric VIII: Normal to screening, IX/X: Palate Elevates Symmetrical  XI: Trapezius Symmetric  XII: Tongue midline  Motor:  Power: 5/5 Lt, 4/5 Rt, tone: normal x 4 limbs, no tremor   Sensation:  Intact to light touch. Intact to pinprick/temperature and vibration. No neglect  Coordination: Finger-nose-finger intact.  Reflexes:  DTR: 3+ Rt, +2 Lt, no clonus  Plantar responses: Down bilaterally    NIHSS: 2 (RUE and RLE drift)  mRS during discharge: 1    New medications on discharge: ASA and plavix for 3 weeks and then ASA 81mg only. Statin 80mg and lisinopril 5mg   to be done as outpatient: TTE, ILR,  Further outpatient workup:  TTE and ILR  Stroke clinic and PCP follow up    Pt was not able to get TTE today and son lives in [place], and he would like pt to drop home before he leaves. Pt's stroke is likely due to , so can get TTE and ILR as o/p. Son and pt agreed with plan, and she is stable for dc today.

## 2024-05-21 NOTE — DISCHARGE NOTE PROVIDER - CARE PROVIDER_API CALL
Chase Jseus  Neurology  [address] [place] [id number]  Phone: [phone]  Fax: [phone]  Follow Up Time:     Zeinab Anderson  Cardiac Electrophysiology  [address] [place] [id number]  Phone: [phone]  Fax: [phone]  Follow Up Time:     Ivett Gaines  Internal Medicine  [address] [place] [id number]  Phone: [phone]  Fax: [phone]  Follow Up Time:

## 2024-05-21 NOTE — DISCHARGE NOTE PROVIDER - NSDCQMANTITHROM_GEN_A_CORE
Patient: Epifanio Hampton Date of Service: 8/3/2022   : 1956 MRN: [id number]       Reason For Visit  Epifanio Hampton is a 66 year old female who presents today for acid reflux.   Chief Complaint   Patient presents with   • Follow-up     Acid reflex            HPI     #GERD  -states omeprazole is helping to control her symptoms  -Pepcid did not help as much  -states it is under controlled  -denies bloody stools, black stools, nausea, vomiting, abdominal pain or heartburn  -would like refills on medication    Wearing a boot for a calcaneal spur in her right foot    Review of Systems  Review of Systems   Constitutional: Negative for activity change, appetite change, chills and fever.   HENT: Negative for congestion, rhinorrhea and sore throat.    Eyes: Negative for pain, discharge and redness.   Respiratory: Negative for cough and shortness of breath.    Cardiovascular: Negative for chest pain and palpitations.   Gastrointestinal: Negative for abdominal pain, constipation, diarrhea, nausea and vomiting.        +heartburn   Genitourinary: Negative for dysuria, frequency and hematuria.   Musculoskeletal: Negative for arthralgias.   Skin: Negative for color change and rash.   Neurological: Negative for dizziness, weakness, numbness and headaches.   Psychiatric/Behavioral: Negative for agitation and confusion.       Allergy  ALLERGIES:   Allergen Reactions   • Augmentin [Amoclan] VOMITING, NAUSEA and DIARRHEA       Medications  Current Outpatient Medications   Medication Sig Dispense Refill   • omeprazole (PrilOSEC) 20 MG capsule Take 1 capsule by mouth every morning. 30 capsule 1   • Budesonide (RHINOCORT ALLERGY NA)      • meclizine (ANTIVERT) 25 MG tablet Take 1 tablet by mouth every 12 hours. 60 tablet 0   • Ascorbic Acid (VITAMIN C PO) Take 1 tablet by mouth daily.     • KRILL OIL PO Take 1 capsule by mouth daily.     • Menaquinone-7 (VITAMIN K2 PO) Take 1 tablet by mouth daily.     • MAGNESIUM OXIDE PO Take 1 tablet  by mouth daily.     • cholecalciferol (VITAMIN D3) 1000 UNITS tablet Take 1,000 Units by mouth daily.     • cetirizine (ZYRTEC) 10 MG tablet Take 10 mg by mouth every evening.        No current facility-administered medications for this visit.       Past Medical History  Active Ambulatory Problems     Diagnosis Date Noted   • Trigger finger (acquired) 12/13/2012   • Vertigo    • Lipid disorder    • Depression with anxiety 04/29/2014     Resolved Ambulatory Problems     Diagnosis Date Noted   • No Resolved Ambulatory Problems     Past Medical History:   Diagnosis Date   • Allergic rhinitis    • DJD (degenerative joint disease)    • Generalized anxiety disorder    • History of shingles 2018   • Trigger finger (acquired) 12/13/2012       Surgical History  Past Surgical History:   Procedure Laterality Date   • Carpal tunnel release Bilateral 08/2013   • Colonoscopy diagnostic  12/01/2009    Colonoscopy, Dx, per patient results normal   • Knee cartilage surgery Left 2014   • Trigger finger release  08/2013   • Wrist surgery Right 12/2021    plate, 3 screws, 6 pins       Family History  Family History   Problem Relation Age of Onset   • Arthritis Mother    • Osteoporosis Mother    • Patient is unaware of any medical problems Father    • Diabetes Maternal Grandmother        Social History  Social History     Tobacco Use   • Smoking status: Never Smoker   • Smokeless tobacco: Never Used   Vaping Use   • Vaping Use: never used   Substance Use Topics   • Alcohol use: No   • Drug use: Never        Physical Exam  Vitals:    08/03/22 0753   BP: 122/78   BP Location: LUE - Left upper extremity   Patient Position: Sitting   Cuff Size: Regular   Pulse: 79   Temp: 98.2 °F (36.8 °C)   TempSrc: Oral   SpO2: 96%   Weight: 76 kg (167 lb 8.8 oz)   Height: 5' 1\" (1.549 m)   PainSc:  0     Body mass index is 31.66 kg/m².    Physical Exam  Gen:  Alert and Oriented x3, NAD, well appearing  HEENT:  normocephalic, atraumatic,  No conjunctival  discharge.  PERRLA, EOMI, exam of the mouth and nose deferred due to COVID-19 pandemic  Neck:  No thyromegaly, or thyroid nodules. No cervical lymphadenopathy  CV:  RRR, S1, S2, no m/r/g.  No pedal edema.  2+ peripheral pulses  Pulm:  CTA, no w/r/r  Abd:  Soft, NT, ND, NABS.  No HSM    MS:  Normal gait. Right boot in place. No effusions present  Neuro:  Alert  Skin:   No rashes or ulcerations.  No concerning skin lesions.  Psych:  Speech fluent and appropriate.  Mentation appropriate.  Normal eye contact       Assessment/Plan    Gastroesophageal reflux disease, unspecified whether esophagitis present  - omeprazole (PrilOSEC) 20 MG capsule; Take 1 capsule by mouth every morning.  Dispense: 90 capsule; Refill: 1    #GERD  -renewed omeprazole 20 mg  -avoid foods that trigger her reflux    Proper usage and side effects of medications reviewed and discussed.    Patient education completed on disease process, etiology, and prognosis.  Return to clinic as clinically indicated.  All questions answered and patient verbalized understanding of the conversation and plan.    Return if symptoms worsen or fail to improve.      Taylor Ramos MD   Yes

## 2024-05-21 NOTE — DISCHARGE NOTE PROVIDER - CARE PROVIDERS DIRECT ADDRESSES
[email or web address]

## 2024-05-21 NOTE — DISCHARGE NOTE PROVIDER - NSDCCPCAREPLAN_GEN_ALL_CORE_FT
PRINCIPAL DISCHARGE DIAGNOSIS  Diagnosis: Stroke  Assessment and Plan of Treatment: During this hospital admission, you had an ischemic stroke. During an ischemic stroke, blood stops flowing to part of your brain because of a blockage in the blood vessel. This can damage areas in the brain that control other parts of the body.  Please take your aspirin and plavix for blood thinning and Atorvastatin for cholesterol medication/blood vessel protection as prescribed to prevent further strokes. Do not skip doses and do not run low on your medication. If you run low on your medication, please contact your doctor.  You will follow up outpatient with the stroke Nurse Practitioner as scheduled below.  Doing your regular tasks may be difficult after you've had a stroke, but you can learn new ways to manage your daily activities. In fact, doing daily activities may help you to regain muscle strength. Be patient, give yourself time to adjust, and appreciate the progress you make. For example, when showering or bathing, test the water temperature with a hand or foot that was not affected by the stroke, use grab bars, a shower seat, a hand-held showerhead, etc. It is normal to feel fatigue after a stroke, while some days may be worse than others, you will continue to improve.  Call 911 right away if you have any of the following symptoms of another stroke:  B: Balance: Sudden: Dizziness, loss of balance, or a sense of falling, difficulty with coordinating movement  E: Eyes: Sudden double vision or trouble seeing in one or both eyes  F: Face: Sudden uneven face  A: Arms (Legs): Sudden weakness, tingling, or loss of feeling on one side of your face or body  S: Speech: Sudden trouble talking or slurred speech, sudden difficulty understanding others  T: Time: Please call 911 right away and go to the emergency room  •Sudden, severe headache  •Blackouts or seizures  Please get ECHO and loop recorder outpaitent. Take aspirin and plavix for 3 weeks then then aspirin only. Follow up with stroke clinic, cardiology and your PCP.

## 2024-05-21 NOTE — DISCHARGE NOTE PROVIDER - PROVIDER TOKENS
PROVIDER:[TOKEN:[[id number]]],PROVIDER:[TOKEN:[[id number]]],PROVIDER:[TOKEN:[[id number]]]

## 2024-05-21 NOTE — DISCHARGE NOTE PROVIDER - NSDCMRMEDTOKEN_GEN_ALL_CORE_FT
aspirin 81 mg oral delayed release tablet: 1 tab(s) orally once a day  atorvastatin 80 mg oral tablet: 1 tab(s) orally once a day (at bedtime)  clopidogrel 75 mg oral tablet: 1 tab(s) orally once a day end date 6/10/2024  lisinopril 5 mg oral tablet: 1 tab(s) orally once a day

## 2024-05-22 ENCOUNTER — INPATIENT (INPATIENT)
Facility: HOSPITAL | Age: 89
LOS: 1 days | Discharge: NURSING, REHAB OR LONG-TERM CARE | DRG: 948 | End: 2024-05-24
Attending: INTERNAL MEDICINE | Admitting: HOSPITALIST
Payer: MEDICARE

## 2024-05-22 VITALS
WEIGHT: 139.99 LBS | TEMPERATURE: 99 F | DIASTOLIC BLOOD PRESSURE: 81 MMHG | HEART RATE: 85 BPM | RESPIRATION RATE: 18 BRPM | HEIGHT: 61 IN | SYSTOLIC BLOOD PRESSURE: 157 MMHG | OXYGEN SATURATION: 97 %

## 2024-05-22 DIAGNOSIS — R53.1 WEAKNESS: ICD-10-CM

## 2024-05-22 DIAGNOSIS — Z79.01 LONG TERM (CURRENT) USE OF ANTICOAGULANTS: ICD-10-CM

## 2024-05-22 DIAGNOSIS — I16.0 HYPERTENSIVE URGENCY: ICD-10-CM

## 2024-05-22 DIAGNOSIS — F03.90 UNSPECIFIED DEMENTIA, UNSPECIFIED SEVERITY, WITHOUT BEHAVIORAL DISTURBANCE, PSYCHOTIC DISTURBANCE, MOOD DISTURBANCE, AND ANXIETY: ICD-10-CM

## 2024-05-22 DIAGNOSIS — Z95.5 PRESENCE OF CORONARY ANGIOPLASTY IMPLANT AND GRAFT: ICD-10-CM

## 2024-05-22 DIAGNOSIS — I63.89 OTHER CEREBRAL INFARCTION: ICD-10-CM

## 2024-05-22 DIAGNOSIS — R40.2413 GLASGOW COMA SCALE SCORE 13-15, AT HOSPITAL ADMISSION: ICD-10-CM

## 2024-05-22 DIAGNOSIS — R50.9 FEVER, UNSPECIFIED: ICD-10-CM

## 2024-05-22 DIAGNOSIS — Z78.9 OTHER SPECIFIED HEALTH STATUS: Chronic | ICD-10-CM

## 2024-05-22 DIAGNOSIS — I25.10 ATHEROSCLEROTIC HEART DISEASE OF NATIVE CORONARY ARTERY WITHOUT ANGINA PECTORIS: ICD-10-CM

## 2024-05-22 DIAGNOSIS — I69.341 MONOPLEGIA OF LOWER LIMB FOLLOWING CEREBRAL INFARCTION AFFECTING RIGHT DOMINANT SIDE: ICD-10-CM

## 2024-05-22 DIAGNOSIS — F10.20 ALCOHOL DEPENDENCE, UNCOMPLICATED: ICD-10-CM

## 2024-05-22 DIAGNOSIS — J45.909 UNSPECIFIED ASTHMA, UNCOMPLICATED: ICD-10-CM

## 2024-05-22 DIAGNOSIS — F32.A DEPRESSION, UNSPECIFIED: ICD-10-CM

## 2024-05-22 DIAGNOSIS — Z79.82 LONG TERM (CURRENT) USE OF ASPIRIN: ICD-10-CM

## 2024-05-22 LAB
ALBUMIN SERPL ELPH-MCNC: 4.3 G/DL — SIGNIFICANT CHANGE UP (ref 3.5–5.2)
ALP SERPL-CCNC: 74 U/L — SIGNIFICANT CHANGE UP (ref 30–115)
ALT FLD-CCNC: 49 U/L — HIGH (ref 0–41)
ANION GAP SERPL CALC-SCNC: 12 MMOL/L — SIGNIFICANT CHANGE UP (ref 7–14)
APTT BLD: 26.5 SEC — LOW (ref 27–39.2)
AST SERPL-CCNC: 79 U/L — HIGH (ref 0–41)
BASOPHILS # BLD AUTO: 0.03 K/UL — SIGNIFICANT CHANGE UP (ref 0–0.2)
BASOPHILS NFR BLD AUTO: 0.6 % — SIGNIFICANT CHANGE UP (ref 0–1)
BILIRUB SERPL-MCNC: 0.4 MG/DL — SIGNIFICANT CHANGE UP (ref 0.2–1.2)
BUN SERPL-MCNC: 10 MG/DL — SIGNIFICANT CHANGE UP (ref 10–20)
CALCIUM SERPL-MCNC: 9 MG/DL — SIGNIFICANT CHANGE UP (ref 8.4–10.5)
CHLORIDE SERPL-SCNC: 103 MMOL/L — SIGNIFICANT CHANGE UP (ref 98–110)
CO2 SERPL-SCNC: 23 MMOL/L — SIGNIFICANT CHANGE UP (ref 17–32)
CREAT SERPL-MCNC: 0.9 MG/DL — SIGNIFICANT CHANGE UP (ref 0.7–1.5)
EGFR: 62 ML/MIN/1.73M2 — SIGNIFICANT CHANGE UP
EOSINOPHIL # BLD AUTO: 0.01 K/UL — SIGNIFICANT CHANGE UP (ref 0–0.7)
EOSINOPHIL NFR BLD AUTO: 0.2 % — SIGNIFICANT CHANGE UP (ref 0–8)
GLUCOSE SERPL-MCNC: 97 MG/DL — SIGNIFICANT CHANGE UP (ref 70–99)
HCT VFR BLD CALC: 38.5 % — SIGNIFICANT CHANGE UP (ref 37–47)
HGB BLD-MCNC: 13.2 G/DL — SIGNIFICANT CHANGE UP (ref 12–16)
IMM GRANULOCYTES NFR BLD AUTO: 0.6 % — HIGH (ref 0.1–0.3)
INR BLD: 1.11 RATIO — SIGNIFICANT CHANGE UP (ref 0.65–1.3)
LYMPHOCYTES # BLD AUTO: 0.66 K/UL — LOW (ref 1.2–3.4)
LYMPHOCYTES # BLD AUTO: 13 % — LOW (ref 20.5–51.1)
MAGNESIUM SERPL-MCNC: 2 MG/DL — SIGNIFICANT CHANGE UP (ref 1.8–2.4)
MCHC RBC-ENTMCNC: 32 PG — HIGH (ref 27–31)
MCHC RBC-ENTMCNC: 34.3 G/DL — SIGNIFICANT CHANGE UP (ref 32–37)
MCV RBC AUTO: 93.2 FL — SIGNIFICANT CHANGE UP (ref 81–99)
MONOCYTES # BLD AUTO: 0.7 K/UL — HIGH (ref 0.1–0.6)
MONOCYTES NFR BLD AUTO: 13.8 % — HIGH (ref 1.7–9.3)
NEUTROPHILS # BLD AUTO: 3.63 K/UL — SIGNIFICANT CHANGE UP (ref 1.4–6.5)
NEUTROPHILS NFR BLD AUTO: 71.8 % — SIGNIFICANT CHANGE UP (ref 42.2–75.2)
NRBC # BLD: 0 /100 WBCS — SIGNIFICANT CHANGE UP (ref 0–0)
PLATELET # BLD AUTO: 228 K/UL — SIGNIFICANT CHANGE UP (ref 130–400)
PMV BLD: 10.4 FL — SIGNIFICANT CHANGE UP (ref 7.4–10.4)
POTASSIUM SERPL-MCNC: 4.1 MMOL/L — SIGNIFICANT CHANGE UP (ref 3.5–5)
POTASSIUM SERPL-SCNC: 4.1 MMOL/L — SIGNIFICANT CHANGE UP (ref 3.5–5)
PROT SERPL-MCNC: 6.5 G/DL — SIGNIFICANT CHANGE UP (ref 6–8)
PROTHROM AB SERPL-ACNC: 12.7 SEC — SIGNIFICANT CHANGE UP (ref 9.95–12.87)
RBC # BLD: 4.13 M/UL — LOW (ref 4.2–5.4)
RBC # FLD: 14.6 % — HIGH (ref 11.5–14.5)
SODIUM SERPL-SCNC: 138 MMOL/L — SIGNIFICANT CHANGE UP (ref 135–146)
WBC # BLD: 5.06 K/UL — SIGNIFICANT CHANGE UP (ref 4.8–10.8)
WBC # FLD AUTO: 5.06 K/UL — SIGNIFICANT CHANGE UP (ref 4.8–10.8)

## 2024-05-22 PROCEDURE — 36415 COLL VENOUS BLD VENIPUNCTURE: CPT

## 2024-05-22 PROCEDURE — 71045 X-RAY EXAM CHEST 1 VIEW: CPT | Mod: 26

## 2024-05-22 PROCEDURE — 97535 SELF CARE MNGMENT TRAINING: CPT | Mod: GO

## 2024-05-22 PROCEDURE — 99285 EMERGENCY DEPT VISIT HI MDM: CPT

## 2024-05-22 PROCEDURE — 86850 RBC ANTIBODY SCREEN: CPT

## 2024-05-22 PROCEDURE — 72170 X-RAY EXAM OF PELVIS: CPT | Mod: 26

## 2024-05-22 PROCEDURE — 97110 THERAPEUTIC EXERCISES: CPT | Mod: GP

## 2024-05-22 PROCEDURE — 97162 PT EVAL MOD COMPLEX 30 MIN: CPT | Mod: GP

## 2024-05-22 PROCEDURE — 85025 COMPLETE CBC W/AUTO DIFF WBC: CPT

## 2024-05-22 PROCEDURE — 82746 ASSAY OF FOLIC ACID SERUM: CPT

## 2024-05-22 PROCEDURE — 84439 ASSAY OF FREE THYROXINE: CPT

## 2024-05-22 PROCEDURE — 86901 BLOOD TYPING SEROLOGIC RH(D): CPT

## 2024-05-22 PROCEDURE — 97166 OT EVAL MOD COMPLEX 45 MIN: CPT | Mod: GO

## 2024-05-22 PROCEDURE — 80061 LIPID PANEL: CPT

## 2024-05-22 PROCEDURE — 83735 ASSAY OF MAGNESIUM: CPT

## 2024-05-22 PROCEDURE — 97116 GAIT TRAINING THERAPY: CPT | Mod: GP

## 2024-05-22 PROCEDURE — 95707 EEG W/O VID 2-12HR CONT MNTR: CPT

## 2024-05-22 PROCEDURE — 86900 BLOOD TYPING SEROLOGIC ABO: CPT

## 2024-05-22 PROCEDURE — 72125 CT NECK SPINE W/O DYE: CPT | Mod: 26,MC

## 2024-05-22 PROCEDURE — 85027 COMPLETE CBC AUTOMATED: CPT

## 2024-05-22 PROCEDURE — 70450 CT HEAD/BRAIN W/O DYE: CPT | Mod: 26,MC

## 2024-05-22 PROCEDURE — 80053 COMPREHEN METABOLIC PANEL: CPT

## 2024-05-22 PROCEDURE — 84443 ASSAY THYROID STIM HORMONE: CPT

## 2024-05-22 PROCEDURE — 82607 VITAMIN B-12: CPT

## 2024-05-22 PROCEDURE — 80048 BASIC METABOLIC PNL TOTAL CA: CPT

## 2024-05-22 RX ORDER — CLOPIDOGREL BISULFATE 75 MG/1
75 TABLET, FILM COATED ORAL DAILY
Refills: 0 | Status: DISCONTINUED | OUTPATIENT
Start: 2024-05-23 | End: 2024-05-24

## 2024-05-22 RX ORDER — ENOXAPARIN SODIUM 100 MG/ML
40 INJECTION SUBCUTANEOUS EVERY 24 HOURS
Refills: 0 | Status: DISCONTINUED | OUTPATIENT
Start: 2024-05-22 | End: 2024-05-24

## 2024-05-22 RX ORDER — ACETAMINOPHEN 500 MG
650 TABLET ORAL EVERY 6 HOURS
Refills: 0 | Status: DISCONTINUED | OUTPATIENT
Start: 2024-05-22 | End: 2024-05-24

## 2024-05-22 RX ORDER — ESCITALOPRAM OXALATE 10 MG/1
30 TABLET, FILM COATED ORAL DAILY
Refills: 0 | Status: DISCONTINUED | OUTPATIENT
Start: 2024-05-22 | End: 2024-05-24

## 2024-05-22 RX ORDER — MONTELUKAST 4 MG/1
10 TABLET, CHEWABLE ORAL DAILY
Refills: 0 | Status: DISCONTINUED | OUTPATIENT
Start: 2024-05-22 | End: 2024-05-24

## 2024-05-22 RX ORDER — THIAMINE MONONITRATE (VIT B1) 100 MG
100 TABLET ORAL DAILY
Refills: 0 | Status: DISCONTINUED | OUTPATIENT
Start: 2024-05-22 | End: 2024-05-24

## 2024-05-22 RX ORDER — ASPIRIN/CALCIUM CARB/MAGNESIUM 324 MG
81 TABLET ORAL DAILY
Refills: 0 | Status: DISCONTINUED | OUTPATIENT
Start: 2024-05-22 | End: 2024-05-24

## 2024-05-22 RX ORDER — FOLIC ACID 0.8 MG
1 TABLET ORAL DAILY
Refills: 0 | Status: DISCONTINUED | OUTPATIENT
Start: 2024-05-22 | End: 2024-05-24

## 2024-05-22 RX ORDER — ATORVASTATIN CALCIUM 80 MG/1
80 TABLET, FILM COATED ORAL AT BEDTIME
Refills: 0 | Status: DISCONTINUED | OUTPATIENT
Start: 2024-05-22 | End: 2024-05-24

## 2024-05-22 RX ORDER — LANOLIN ALCOHOL/MO/W.PET/CERES
3 CREAM (GRAM) TOPICAL AT BEDTIME
Refills: 0 | Status: DISCONTINUED | OUTPATIENT
Start: 2024-05-22 | End: 2024-05-24

## 2024-05-22 RX ORDER — LABETALOL HCL 100 MG
10 TABLET ORAL ONCE
Refills: 0 | Status: COMPLETED | OUTPATIENT
Start: 2024-05-22 | End: 2024-05-22

## 2024-05-22 RX ORDER — LISINOPRIL 2.5 MG/1
5 TABLET ORAL DAILY
Refills: 0 | Status: DISCONTINUED | OUTPATIENT
Start: 2024-05-22 | End: 2024-05-24

## 2024-05-22 RX ORDER — DONEPEZIL HYDROCHLORIDE 10 MG/1
10 TABLET, FILM COATED ORAL AT BEDTIME
Refills: 0 | Status: DISCONTINUED | OUTPATIENT
Start: 2024-05-22 | End: 2024-05-24

## 2024-05-22 RX ADMIN — Medication 10 MILLIGRAM(S): at 22:20

## 2024-05-22 NOTE — ED PROVIDER NOTE - WR ORDER DATE AND TIME 1
Spoke with Ms. Izquierdo agreed and verbalized understanding to an appt with Dr. Fowler on 06/11/19 @ 9:40 am ; appt booked   22-May-2024 13:44

## 2024-05-22 NOTE — H&P ADULT - HISTORY OF PRESENT ILLNESS
Patient is an 89 y/o F with PMHx of CABG and CVA with right leg weakness that presented to the ED on 5/22 with generalized weakness. Patient was discharged yesterday 5/21 after an admission for CVA. Upon waking up today she felt generally weak and fatigued. VNS was in the home however patient decided to take a shower unattended and fell onto her knees because her right leg gave out. She denies any injury, head strike or LOC. VNS recommended coming to the hospital because of fall risk at home and she fell off the couch onto her buttock while she was waiting for EMS. She again denied injury, head strike and LOC. She denies AV but she is on ASA and Plavix. Denies headache, dizziness, vision changes, worsening extremity weakness, chest pain, shortness of breath, nausea, vomiting, abdominal pain, diarrhea, dysuria.    Vitals on admission: TMax 98.7, HR 60, /73, O2 Sat 97% on RA  Labs on admission: AST 79, otherwise unremarkable  Imaging on admission: Trauma workup unremarkable.   - CT Head Non Con: Stable findings. No acute intracranial pathology.  - CT C-Spine: No acute cervical fracture or dislocation. Multilevel degenerative changes.  - XRay Pelvis: No acute fracture or acute articular abnormality.  - CXR: No radiographic evidence of acute cardiopulmonary disease.    Patient admitted for further workup s/p fall.  Patient is an 87 y/o F with PMHx of CABG and CVA with right leg weakness that presented to the ED on 5/22 with generalized weakness.     Patient was discharged yesterday 5/21 after an admission for CVA. Upon waking up today she felt generally weak and fatigued. VNS was in the home however patient decided to take a shower unattended and fell onto her knees because her right leg gave out. She denies any injury, head strike or LOC. VNS recommended coming to the hospital because of fall risk at home and she fell off the couch onto her buttock while she was waiting for EMS. She again denied injury, head strike and LOC. She denies AV but she is on ASA and Plavix. Denies headache, dizziness, vision changes, worsening extremity weakness, chest pain, shortness of breath, nausea, vomiting, abdominal pain, diarrhea, dysuria.    Vitals on admission: TMax 98.7, HR 60, /73, O2 Sat 97% on RA  Labs on admission: AST 79, otherwise unremarkable  Imaging on admission: Trauma workup unremarkable.   - CT Head Non Con: Stable findings. No acute intracranial pathology.  - CT C-Spine: No acute cervical fracture or dislocation. Multilevel degenerative changes.  - XRay Pelvis: No acute fracture or acute articular abnormality.  - CXR: No radiographic evidence of acute cardiopulmonary disease.    Patient admitted for further workup s/p fall.  Patient is an 87 y/o F with PMHx of CABG, dementia, asthma, alcohol use disorder, and recent CVA with right leg weakness (on 5/19/2024) that presented to the ED on 5/22 with generalized weakness s/p two falls at home. Patient was discharged yesterday 5/21 after admission for CVA. History obtained from patient's son David (HCP) on the phone; states that VNS was at the patient's home evaluating her for home PT/OT. Noticed that patient was unsteady on her feet, and while speaking to son on the phone, patient fell from standing position onto her knees onto a carpeted floor, no head injury or LOC. Reports patient does not take AC. Decision was made to call EMS; when EMS arrived and was beginning transport into [facility], patient once again fell from standing position onto her knees, no head injury or LOC. Patient denies headache, dizziness, vision changes, worsening extremity weakness, chest pain, shortness of breath, nausea, vomiting, abdominal pain, diarrhea, dysuria.    Vitals on admission: TMax 98.7, HR 60, /73 (on recheck 193/76 --> given Labetalol 10 mg IVP x1), O2 Sat 97% on RA  Labs on admission: AST 79, otherwise unremarkable  Imaging on admission: Trauma workup unremarkable.   - CT Head Non Con: Stable findings. No acute intracranial pathology.  - CT C-Spine: No acute cervical fracture or dislocation. Multilevel degenerative changes.  - XRay Pelvis: No acute fracture or acute articular abnormality.  - CXR: No radiographic evidence of acute cardiopulmonary disease.    Patient admitted for further workup s/p fall.  Patient is an 87 y/o F with PMHx of CABG, dementia, asthma, alcohol use disorder, and recent CVA with right leg weakness (on 5/19/2024) that presented to the ED on 5/22 with generalized weakness s/p two falls at home. Patient was discharged yesterday 5/21 after admission for CVA. History obtained from patient's son David (HCP) on the phone; states that VNS was at the patient's home evaluating her for home PT/OT. Noticed that patient was unsteady on her feet, and while speaking to son on the phone, patient fell from standing position onto her knees onto a carpeted floor, no head injury or LOC. Reports patient is on ASA and Plavix for recent stroke but no other AC. Decision was made to call EMS; when EMS arrived and was beginning transport into [facility], patient once again fell from standing position onto her knees, no head injury or LOC. Patient denies headache, dizziness, vision changes, worsening extremity weakness, chest pain, shortness of breath, nausea, vomiting, abdominal pain, diarrhea, dysuria.    Vitals on admission: TMax 98.7, HR 60, /73 (on recheck 193/76 --> given Labetalol 10 mg IVP x1), O2 Sat 97% on RA  Labs on admission: AST 79, otherwise unremarkable  Imaging on admission: Trauma workup unremarkable.   - CT Head Non Con: Stable findings. No acute intracranial pathology.  - CT C-Spine: No acute cervical fracture or dislocation. Multilevel degenerative changes.  - XRay Pelvis: No acute fracture or acute articular abnormality.  - CXR: No radiographic evidence of acute cardiopulmonary disease.    Patient admitted for further workup s/p fall.

## 2024-05-22 NOTE — ED PROVIDER NOTE - ATTENDING APP SHARED VISIT CONTRIBUTION OF CARE
88-year-old female with past medical history of CAD status post CABG, CVA, recent admission for stroke, presents after feeling generally fatigued and weak.  Was just discharged yesterday and states that since she has been home has had trouble taking care of herself.  States that she had trouble taking a shower.  Denies any injuries or head trauma.  Visiting nurse recommended that she return to the ED.  Has not had physical therapy set up at home per patient.  On exam nontoxic, vital signs noted, normocephalic, atraumatic, C-spine nontender no step-offs, lungs clear bilaterally, heart regular no murmurs, abdomen benign, 5 out of 5 strength bilateral lower extremities and left upper extremity 4 out of 5 on the right, no tenderness or deformities.  Based on this history anticipate admission for unsafe discharge.  Labs overall reassuring aside from minimal increase in LFTs but has no abdominal pain or tenderness.  Defer imaging at this time.  Pelvic x-ray and chest x-ray with no acute abnormalities.  Pending CT cervical spine and head and if reassuring will readmit.

## 2024-05-22 NOTE — ED PROVIDER NOTE - CLINICAL SUMMARY MEDICAL DECISION MAKING FREE TEXT BOX
36.9
88-year-old female with past medical history of CAD status post CABG, CVA, recent admission for stroke, presents after feeling generally fatigued and weak.  Was just discharged yesterday and states that since she has been home has had trouble taking care of herself.  States that she had trouble taking a shower.  Denies any injuries or head trauma.  Visiting nurse recommended that she return to the ED.  Has not had physical therapy set up at home per patient.  On exam nontoxic, vital signs noted, normocephalic, atraumatic, C-spine nontender no step-offs, lungs clear bilaterally, heart regular no murmurs, abdomen benign, 5 out of 5 strength bilateral lower extremities and left upper extremity 4 out of 5 on the right, no tenderness or deformities.  Labs overall reassuring aside from minimal increase in LFTs but has no abdominal pain or tenderness.  Defer imaging at this time.  Pelvic x-ray and chest x-ray with no acute abnormalities.  CTs reassuring.  admission for unsafe discharge

## 2024-05-22 NOTE — H&P ADULT - ASSESSMENT
Patient is an 87 y/o F with PMHx of CABG and CVA with right leg weakness that presented to the ED on 5/22 with generalized weakness. Patient was discharged yesterday 5/21 after an admission for CVA. Upon waking up today she felt generally weak and fatigued. VNS was in the home however patient decided to take a shower unattended and fell onto her knees because her right leg gave out. She denies any injury, head strike or LOC. VNS recommended coming to the hospital because of fall risk at home and she fell off the couch onto her buttock while she was waiting for EMS. She again denied injury, head strike and LOC. She denies AV but she is on ASA and Plavix. Denies headache, dizziness, vision changes, worsening extremity weakness, chest pain, shortness of breath, nausea, vomiting, abdominal pain, diarrhea, dysuria.    Vitals on admission: TMax 98.7, HR 60, /73, O2 Sat 97% on RA  Labs on admission: AST 79, otherwise unremarkable  Imaging on admission: Trauma workup unremarkable.   - CT Head Non Con: Stable findings. No acute intracranial pathology.  - CT C-Spine: No acute cervical fracture or dislocation. Multilevel degenerative changes.  - XRay Pelvis: No acute fracture or acute articular abnormality.  - CXR: No radiographic evidence of acute cardiopulmonary disease.    Patient admitted for further workup s/p fall.       MISC    #DVT prophylaxis: Lovenox  #GI prophylaxis: PPI  #Diet: DASH/TLC  #Activity: Ambulate with Assistance  #Code status: Full Code  #Disposition: Medicine   **THIS NOTE IS STILL IN PROGRESS**    Patient is an 87 y/o F with PMHx of CABG and CVA with right leg weakness that presented to the ED on 5/22 with generalized weakness. Patient was discharged yesterday 5/21 after an admission for CVA. Upon waking up today she felt generally weak and fatigued. VNS was in the home however patient decided to take a shower unattended and fell onto her knees because her right leg gave out. She denies any injury, head strike or LOC. VNS recommended coming to the hospital because of fall risk at home and she fell off the couch onto her buttock while she was waiting for EMS. She again denied injury, head strike and LOC. She denies AV but she is on ASA and Plavix. Denies headache, dizziness, vision changes, worsening extremity weakness, chest pain, shortness of breath, nausea, vomiting, abdominal pain, diarrhea, dysuria.    Vitals on admission: TMax 98.7, HR 60, /73, O2 Sat 97% on RA  Labs on admission: AST 79, otherwise unremarkable  Imaging on admission: Trauma workup unremarkable.   - CT Head Non Con: Stable findings. No acute intracranial pathology.  - CT C-Spine: No acute cervical fracture or dislocation. Multilevel degenerative changes.  - XRay Pelvis: No acute fracture or acute articular abnormality.  - CXR: No radiographic evidence of acute cardiopulmonary disease.    Patient admitted for further workup s/p fall.     #Fall  -     #Ischemic stroke located in left BG, semiovale as seen on MRI  - c/w ASA and plavix for 3 weeks (started 5/19; end date 6/9) and then ASA 81mg only  - c/w statin 80mg and lisinopril 5mg  - on discharge, patient required TTE and ILR to be done as outpatient          MISC  #DVT prophylaxis: Lovenox  #GI prophylaxis: PPI  #Diet: DASH/TLC  #Activity: Ambulate with Assistance  #Code status: Full Code  #Disposition: Medicine   **THIS NOTE IS STILL IN PROGRESS**    Patient is an 89 y/o F with PMHx of CABG, dementia, asthma, alcohol use disorder, and recent CVA with right leg weakness (on 5/19/2024) that presented to the ED on 5/22 with generalized weakness s/p two falls at home. Patient was discharged yesterday 5/21 after admission for CVA. History obtained from patient's son David (HCP) on the phone; states that VNS was at the patient's home evaluating her for home PT/OT. Noticed that patient was unsteady on her feet, and while speaking to son on the phone, patient fell from standing position onto her knees onto a carpeted floor, no head injury or LOC. Reports patient does not take AC. Decision was made to call EMS; when EMS arrived and was beginning transport into [facility], patient once again fell from standing position onto her knees, no head injury or LOC. Patient denies headache, dizziness, vision changes, worsening extremity weakness, chest pain, shortness of breath, nausea, vomiting, abdominal pain, diarrhea, dysuria.    Patient admitted for further workup s/p fall.     #Fall  - Vitals on admission: TMax 98.7, HR 60, /73 (on recheck 193/76 --> given Labetalol 10 mg IVP x1), O2 Sat 97% on RA  - Labs on admission: AST 79, otherwise unremarkable  - Imaging on admission: Trauma workup unremarkable.   - CT Head Non Con: Stable findings. No acute intracranial pathology.  - CT C-Spine: No acute cervical fracture or dislocation. Multilevel degenerative changes.  - XRay Pelvis: No acute fracture or acute articular abnormality.  - CXR: No radiographic evidence of acute cardiopulmonary disease.    #Ischemic stroke located in left BG, semiovale as seen on MRI  - c/w ASA and plavix for 3 weeks (started 5/19; end date 6/9) and then ASA 81mg only  - c/w statin 80mg and lisinopril 5mg  - on discharge, patient required TTE and ILR to be done as outpatient  - vitals on admission significant for /73 (on recheck 193/76 --> given Labetalol 10 mg IVP x1)      MISC  #DVT prophylaxis: Lovenox  #GI prophylaxis: PPI  #Diet: DASH/TLC  #Activity: Ambulate with Assistance  #Code status: Full Code  #Disposition: Medicine   Patient is an 87 y/o F with PMHx of CABG, dementia, asthma, alcohol use disorder, and recent CVA with right leg weakness (on 5/19/2024) that presented to the ED on 5/22 with generalized weakness s/p two falls at home. Patient was discharged yesterday 5/21 after admission for CVA. History obtained from patient's son David (HCP) on the phone; states that VNS was at the patient's home evaluating her for home PT/OT. Noticed that patient was unsteady on her feet, and while speaking to son on the phone, patient fell from standing position onto her knees onto a carpeted floor, no head injury or LOC. Reports patient does not take AC. Decision was made to call EMS; when EMS arrived and was beginning transport into [facility], patient once again fell from standing position onto her knees, no head injury or LOC. Patient denies headache, dizziness, vision changes, worsening extremity weakness, chest pain, shortness of breath, nausea, vomiting, abdominal pain, diarrhea, dysuria.    Patient admitted for further workup s/p fall.     #Fall  - Vitals on admission: TMax 98.7, HR 60, /73 (on recheck 193/76 --> given Labetalol 10 mg IVP x1), O2 Sat 97% on RA  - Trauma workup on admission unremarkable, no fractures  - f/u Echo  - f/u EEG  - f/u TSH  - PT and OT consult  - Physiatry consult -- on 5/20 (previous admission), patient seen by physiatry, decision for inpatient rehab was to be determined)    #Ischemic stroke located in left BG, semiovale as seen on MRI  - vitals on admission significant for /73 --> on recheck 193/76 --> patient asymptomatic. Given Labetalol 10 mg IVP x1  - goal SBP <160  - c/w ASA and plavix for 3 weeks (started 5/19; Plavix end date 6/10) and then ASA 81mg only  - c/w atorvastatin 80mg and lisinopril 5mg  - on recent discharge, patient required TTE and ILR to be done as outpatient  - f/u Echo  - EP consult placed for ILR    #hx of CABG  - in 1998  - patient follows with cardiologist at [facility]    #Alcohol Use Disorder  - Patient's son reports that patient drinks approx 4-5 glasses of wine per day, has been drinking this way "for years"  - Patient is likely out of the window for delirium tremens as patient's son states that patient has not had alcohol since 5/19, but unsure if patient drank wine after discharge from hospital on 5/21  - [facility] protocol  - folic acid and thiamine supplementation  - f/u serum folate and Vitamin B12    #Dementia  - c/w Donepezil 10 mg qhs    #Depression  - c/w Lexapro 30 mg qd    #Asthma  - c/w Montelukast 10 mg qd    MISC  #DVT prophylaxis: Lovenox  #GI prophylaxis: PPI  #Diet: DASH/TLC  #Activity: Ambulate with Assistance  #Code status: Full Code  #Disposition: Medicine   Patient is an 89 y/o F with PMHx of CABG, dementia, asthma, alcohol use disorder, and recent CVA with right leg weakness (on 5/19/2024) that presented to the ED on 5/22 with generalized weakness s/p two falls at home. Patient admitted for further workup s/p fall.     #Fall  - s/p two falls at home, denies head injury, LOC; patient is on ASA and Plavix for recent stroke but denies other AC use  - Vitals on admission: TMax 98.7, HR 60, /73 (on recheck 193/76 --> given Labetalol 10 mg IVP x1), O2 Sat 97% on RA  - Trauma workup on admission unremarkable, no fractures  - f/u Echo  - f/u EEG  - f/u TSH  - PT and OT consult  - Physiatry consult -- on 5/20 (previous admission), patient seen by physiatry, decision for inpatient rehab was to be determined)    #Ischemic stroke located in left BG, semiovale as seen on MRI  - vitals on admission significant for /73 --> on recheck 193/76 --> patient asymptomatic. Given Labetalol 10 mg IVP x1  - goal SBP <160  - c/w ASA and plavix for 3 weeks (started 5/19; Plavix end date 6/10) and then ASA 81mg only  - c/w atorvastatin 80mg and lisinopril 5mg  - on recent discharge, patient required TTE and ILR to be done as outpatient  - f/u Echo  - EP consult placed for ILR    #hx of CABG  - in 1998  - patient follows with cardiologist at [facility]    #Alcohol Use Disorder  - Patient's son reports that patient drinks approx 4-5 glasses of wine per day, has been drinking this way "for years"  - Patient is likely out of the window for delirium tremens as patient's son states that patient has not had alcohol since 5/19, but unsure if patient drank wine after discharge from hospital on 5/21  - [facility] protocol  - folic acid and thiamine supplementation  - f/u serum folate and Vitamin B12    #Dementia  - c/w Donepezil 10 mg qhs    #Depression  - c/w Lexapro 30 mg qd    #Asthma  - c/w Montelukast 10 mg qd    MISC  #DVT prophylaxis: Lovenox  #GI prophylaxis: PPI  #Diet: DASH/TLC  #Activity: Ambulate with Assistance  #Code status: Full Code  #Disposition: Medicine   Patient is an 89 y/o F with PMHx of CABG, dementia, asthma, alcohol use disorder, and recent CVA with right leg weakness (on 5/19/2024) that presented to the ED on 5/22 with generalized weakness s/p two falls at home. Patient admitted for further workup s/p fall.     #Fall  - s/p two falls at home, denies head injury, LOC; patient is on ASA and Plavix for recent stroke but denies other AC use  - Vitals on admission: TMax 98.7, HR 60, /73 (on recheck 193/76 --> given Labetalol 10 mg IVP x1), O2 Sat 97% on RA  - Trauma workup on admission unremarkable, no fractures  - f/u Echo  - f/u EEG  - f/u TSH  - PT and OT consult  - Physiatry consult -- on 5/20 (previous admission), patient seen by physiatry, decision for inpatient rehab was to be determined)    #Ischemic stroke located in left BG, semiovale as seen on MRI  - vitals on admission significant for /73 --> on recheck 193/76 --> patient asymptomatic. Given Labetalol 10 mg IVP x1  - goal SBP <160  - c/w ASA and plavix for 3 weeks (started 5/19; Plavix end date 6/10) and then ASA 81mg only  - c/w atorvastatin 80mg and lisinopril 5mg  - on recent discharge, patient required TTE and ILR to be done as outpatient  - f/u Echo  - EP consult placed for ILR    #hx of CABG  - in 1998  - patient follows with cardiologist at [facility]    #Alcohol Use Disorder  - Patient's son reports that patient drinks approx 4-5 glasses of wine per day, has been drinking this way "for years"  - Patient is likely out of the window for delirium tremens as patient's son states that patient has not had alcohol since 5/19, but there is a chance that patient drank wine after discharge from hospital on 5/21  - [facility] protocol  - folic acid and thiamine supplementation  - f/u serum folate and Vitamin B12    #Dementia  - c/w Donepezil 10 mg qhs    #Depression  - c/w Lexapro 30 mg qd    #Asthma  - c/w Montelukast 10 mg qd    MISC  #DVT prophylaxis: Lovenox  #GI prophylaxis: PPI  #Diet: DASH/TLC  #Activity: Ambulate with Assistance  #Code status: Full Code  #Disposition: Medicine   Patient is an 89 y/o F with PMHx of CABG, dementia, asthma, alcohol use disorder, and recent CVA with right leg weakness (on 5/19/2024) that presented to the ED on 5/22 with generalized weakness s/p two falls at home. Patient admitted for further workup s/p fall.     #Fall  - s/p two falls at home, denies head injury, denies LOC; patient is on ASA and Plavix for recent stroke but denies other AC use  - Vitals on admission: TMax 98.7, HR 60, /73 (on recheck 193/76 --> given Labetalol 10 mg IVP x1), O2 Sat 97% on RA  - Trauma workup on admission unremarkable, no fractures  - f/u Echo  - f/u EEG  - f/u TSH  - PT and OT consult  - Physiatry consult -- on 5/20 (previous admission), patient seen by physiatry, decision for inpatient rehab was to be determined    #Ischemic stroke located in left BG, semiovale as seen on MRI  - vitals on admission significant for /73 --> on recheck 193/76 --> patient asymptomatic. Given Labetalol 10 mg IVP x1  - goal SBP <160  - c/w ASA and plavix for 3 weeks (started 5/19; Plavix end date 6/10) and then ASA 81mg only  - c/w atorvastatin 80mg and lisinopril 5mg  - on recent discharge, patient required TTE and ILR to be done as outpatient  - f/u Echo  - EP consult placed for ILR    #hx of CABG  - in 1998  - patient follows with cardiologist at [facility] (name unknown at this time)    #Alcohol Use Disorder  - Patient's son reports that patient drinks approx 4-5 glasses of wine per day, has been drinking this way "for years"  - Patient is likely out of the window for delirium tremens as patient's son states that patient has not had alcohol since 5/19, but there is a chance that patient drank wine after discharge from hospital on 5/21  - [facility] protocol  - folic acid and thiamine supplementation  - f/u serum folate and Vitamin B12    #Dementia  - c/w Donepezil 10 mg qhs    #Depression  - c/w Lexapro 30 mg qd    #Asthma  - c/w Montelukast 10 mg qd    MISC  #DVT prophylaxis: Lovenox  #GI prophylaxis: PPI  #Diet: DASH/TLC  #Activity: Ambulate with Assistance  #Code status: Full Code  #Disposition: Medicine   Patient is an 89 y/o F with PMHx of CABG, dementia, asthma, alcohol use disorder, and recent CVA with right leg weakness (on 5/19/2024) that presented to the ED on 5/22 with generalized weakness s/p two falls at home. Patient admitted for further workup s/p fall.     #Fall  - s/p two falls at home, denies head injury, denies LOC; patient is on ASA and Plavix for recent stroke but denies other AC use  - Vitals on admission: TMax 98.7, HR 60, /73 (on recheck 193/76 --> given Labetalol 10 mg IVP x1), O2 Sat 97% on RA  - Trauma workup on admission unremarkable, no fractures  - f/u Echo  - f/u EEG  - f/u TSH  - PT and OT consult  - Physiatry consult -- on 5/20 (previous admission), patient seen by physiatry, decision for inpatient rehab was to be determined    #Ischemic stroke located in left BG, semiovale as seen on MRI with residual R sided weakness  - ischemic CVA w/ residual R sided weakness occurred on 5/19/2024  - vitals on admission significant for /73 --> on recheck 193/76 --> patient asymptomatic. Given Labetalol 10 mg IVP x1 --> BP improved 160/72  - goal SBP <160  - c/w ASA and plavix for 3 weeks (started 5/19; Plavix end date 6/10) and then ASA 81mg only  - c/w atorvastatin 80mg and lisinopril 5mg  - on recent discharge, patient required TTE and ILR to be done as outpatient  - f/u Echo  - EP consult placed for ILR    #hx of CABG  - in 1998  - patient follows with cardiologist at [facility] (name unknown at this time)    #Alcohol Use Disorder  - Patient's son reports that patient drinks approx 4-5 glasses of wine per day, has been drinking this way "for years"  - Patient is likely out of the window for delirium tremens as patient's son states that patient has not had alcohol since 5/19, but there is a chance that patient drank wine after discharge from hospital on 5/21  - [facility] protocol  - folic acid and thiamine supplementation  - f/u serum folate and Vitamin B12    #Dementia  - c/w Donepezil 10 mg qhs    #Depression  - c/w Lexapro 30 mg qd    #Asthma  - c/w Montelukast 10 mg qd    MISC  #DVT prophylaxis: Lovenox  #GI prophylaxis: PPI  #Diet: DASH/TLC  #Activity: Ambulate with Assistance  #Code status: Full Code  #Disposition: Medicine

## 2024-05-22 NOTE — ED PROVIDER NOTE - NSCAREINITIATED _GEN_ER
Smooth Maria(PA)
Pt presents c/o left side abdominal pain, 6/10. per mom her spleen. Pt also report chest pain and left shoulder pain. Pt has episode of dizziness in waiting room, which has self-resolved. Pt. was dx [place] 10/9. Pt. is alert, no distress, +mono

## 2024-05-22 NOTE — H&P ADULT - ATTENDING COMMENTS
patient seen and examined independently of medical resident     Vital Signs Last 24 Hrs  T(C): 37.3 (23 May 2024 05:05), Max: 38.2 (23 May 2024 00:05)  T(F): 99.1 (23 May 2024 05:05), Max: 100.7 (23 May 2024 00:05)  HR: 60 (23 May 2024 05:05) (60 - 66)  BP: 178/79 (23 May 2024 05:05) (160/72 - 193/76)  BP(mean): --  RR: 19 (23 May 2024 05:05) (18 - 19)  SpO2: 95% (23 May 2024 05:00) (95% - 97%)    Parameters below as of 23 May 2024 00:05  Patient On (Oxygen Delivery Method): room air                          13.7   4.30  )-----------( 241      ( 23 May 2024 09:59 )             41.8   05-23    136  |  102  |  16  ----------------------------<  135<H>  3.8   |  17  |  0.9    Ca    9.3      23 May 2024 09:59  Mg     2.1     05-23    TPro  7.3  /  Alb  4.7  /  TBili  0.3  /  DBili  x   /  AST  109<H>  /  ALT  73<H>  /  AlkPhos  82  05-23    # s/p Fall   # Hypertensive Urgency   # Mild Fever  # Recent hospitalization for CVA   # CVA with Right sided residual deficits  # ETOH dependence   # Age related debility   # Unsafe home discharge   # CABG  # Dementia / Depression   # Asthma - no exacerbation     -prelim trauma work up negative  -on DAPT for recent stroke   -check infectious work up for current SIRS - recent hospitalization   -CIWA protocol - MVI, folate, thiamine - CATCH team c/s  -clarify home meds   -monitor Bp and adjust meds - add hydralazine prn with parameters   -REHAB/PT evaluation for STR dispo - not safe d/c home - patient lives alone and appears unsafe for home d/c leisa in lieu of current re-admission within 24 hrs   -skin care as per nursing   **** patient's care discussed with Neurology team that discharged her 05/21 home with home care (home d/c was requested by patient and her son - the team recommended STR dispo but was declined)    **** patient needs hospital admission for medical stabilization and needs safe dispo - CM and  evaluation     Attending Physician Dr. Meagan Santiago # [id number]

## 2024-05-22 NOTE — ED PROVIDER NOTE - WHICH SHOWED
JFK: I, Yan Portillo, have done an independent interpretation of the chest x-ray film and my findings are as follows: No focal consolidation, signs of opacities or edema, pneumothorax, free air or evidence of trauma.

## 2024-05-22 NOTE — ED ADULT TRIAGE NOTE - CHIEF COMPLAINT QUOTE
Recently d/c from hospital dx with stroke. Visiting nurse reports no pt was set up for pt, pt is unable to ambulate on her own. Pt reports she feels weak. Pt slipped off couch onto buttocks, no loc/ht.

## 2024-05-22 NOTE — ED PROVIDER NOTE - CARE PLAN
Principal Discharge DX:	Generalized weakness   1 Principal Discharge DX:	Generalized weakness  Secondary Diagnosis:	Fall in home

## 2024-05-22 NOTE — H&P ADULT - TIME BILLING
direct patient care and chart review  -coordinated current plan of care with medical staff on MDR  -case discussed with Neurology team from 05/21/24 discharge

## 2024-05-22 NOTE — ED PROVIDER NOTE - OBJECTIVE STATEMENT
88 year old female with a history of CABG and CVA with right leg weakness presents to the ED with generalized weakness. Patient was discharged yesterday after an admission for CVA. Upon waking up today she felt generally weak and fatigued. VNS was in the home however patient decided to take a shower unattended and fell onto her knees because her right leg gave out. She denies any injury, head strike or LOC. VNS recommended coming to the hospital because of fall risk at home and she fell off the couch onto her buttock while she was waiting for EMS. She again denied injury, head strike and LOC. She denies AV but she is on ASA and Plavix. Denies headache, dizziness, vision changes, worsening extremity weakness, chest pain, shortness of breath, nausea, vomiting, abdominal pain, diarrhea, dysuria.

## 2024-05-23 LAB
ALBUMIN SERPL ELPH-MCNC: 4.7 G/DL — SIGNIFICANT CHANGE UP (ref 3.5–5.2)
ALP SERPL-CCNC: 82 U/L — SIGNIFICANT CHANGE UP (ref 30–115)
ALT FLD-CCNC: 73 U/L — HIGH (ref 0–41)
ANION GAP SERPL CALC-SCNC: 17 MMOL/L — HIGH (ref 7–14)
AST SERPL-CCNC: 109 U/L — HIGH (ref 0–41)
BASOPHILS # BLD AUTO: 0.02 K/UL — SIGNIFICANT CHANGE UP (ref 0–0.2)
BASOPHILS NFR BLD AUTO: 0.5 % — SIGNIFICANT CHANGE UP (ref 0–1)
BILIRUB SERPL-MCNC: 0.3 MG/DL — SIGNIFICANT CHANGE UP (ref 0.2–1.2)
BUN SERPL-MCNC: 16 MG/DL — SIGNIFICANT CHANGE UP (ref 10–20)
CALCIUM SERPL-MCNC: 9.3 MG/DL — SIGNIFICANT CHANGE UP (ref 8.4–10.5)
CHLORIDE SERPL-SCNC: 102 MMOL/L — SIGNIFICANT CHANGE UP (ref 98–110)
CHOLEST SERPL-MCNC: 193 MG/DL — SIGNIFICANT CHANGE UP
CO2 SERPL-SCNC: 17 MMOL/L — SIGNIFICANT CHANGE UP (ref 17–32)
CREAT SERPL-MCNC: 0.9 MG/DL — SIGNIFICANT CHANGE UP (ref 0.7–1.5)
EGFR: 62 ML/MIN/1.73M2 — SIGNIFICANT CHANGE UP
EOSINOPHIL # BLD AUTO: 0 K/UL — SIGNIFICANT CHANGE UP (ref 0–0.7)
EOSINOPHIL NFR BLD AUTO: 0 % — SIGNIFICANT CHANGE UP (ref 0–8)
FOLATE SERPL-MCNC: 6.4 NG/ML — SIGNIFICANT CHANGE UP
GLUCOSE SERPL-MCNC: 135 MG/DL — HIGH (ref 70–99)
HCT VFR BLD CALC: 41.8 % — SIGNIFICANT CHANGE UP (ref 37–47)
HDLC SERPL-MCNC: 67 MG/DL — SIGNIFICANT CHANGE UP
HGB BLD-MCNC: 13.7 G/DL — SIGNIFICANT CHANGE UP (ref 12–16)
IMM GRANULOCYTES NFR BLD AUTO: 0.2 % — SIGNIFICANT CHANGE UP (ref 0.1–0.3)
LIPID PNL WITH DIRECT LDL SERPL: 106 MG/DL — HIGH
LYMPHOCYTES # BLD AUTO: 0.78 K/UL — LOW (ref 1.2–3.4)
LYMPHOCYTES # BLD AUTO: 18.1 % — LOW (ref 20.5–51.1)
MAGNESIUM SERPL-MCNC: 2.1 MG/DL — SIGNIFICANT CHANGE UP (ref 1.8–2.4)
MCHC RBC-ENTMCNC: 30.6 PG — SIGNIFICANT CHANGE UP (ref 27–31)
MCHC RBC-ENTMCNC: 32.8 G/DL — SIGNIFICANT CHANGE UP (ref 32–37)
MCV RBC AUTO: 93.3 FL — SIGNIFICANT CHANGE UP (ref 81–99)
MONOCYTES # BLD AUTO: 0.51 K/UL — SIGNIFICANT CHANGE UP (ref 0.1–0.6)
MONOCYTES NFR BLD AUTO: 11.9 % — HIGH (ref 1.7–9.3)
NEUTROPHILS # BLD AUTO: 2.98 K/UL — SIGNIFICANT CHANGE UP (ref 1.4–6.5)
NEUTROPHILS NFR BLD AUTO: 69.3 % — SIGNIFICANT CHANGE UP (ref 42.2–75.2)
NON HDL CHOLESTEROL: 126 MG/DL — SIGNIFICANT CHANGE UP
NRBC # BLD: 0 /100 WBCS — SIGNIFICANT CHANGE UP (ref 0–0)
PLATELET # BLD AUTO: 241 K/UL — SIGNIFICANT CHANGE UP (ref 130–400)
PMV BLD: 10.2 FL — SIGNIFICANT CHANGE UP (ref 7.4–10.4)
POTASSIUM SERPL-MCNC: 3.8 MMOL/L — SIGNIFICANT CHANGE UP (ref 3.5–5)
POTASSIUM SERPL-SCNC: 3.8 MMOL/L — SIGNIFICANT CHANGE UP (ref 3.5–5)
PROT SERPL-MCNC: 7.3 G/DL — SIGNIFICANT CHANGE UP (ref 6–8)
RBC # BLD: 4.48 M/UL — SIGNIFICANT CHANGE UP (ref 4.2–5.4)
RBC # FLD: 14.6 % — HIGH (ref 11.5–14.5)
SODIUM SERPL-SCNC: 136 MMOL/L — SIGNIFICANT CHANGE UP (ref 135–146)
T4 FREE SERPL-MCNC: 1 NG/DL — SIGNIFICANT CHANGE UP (ref 0.9–1.8)
TRIGL SERPL-MCNC: 100 MG/DL — SIGNIFICANT CHANGE UP
TSH SERPL-MCNC: 1.18 UIU/ML — SIGNIFICANT CHANGE UP (ref 0.27–4.2)
VIT B12 SERPL-MCNC: 926 PG/ML — SIGNIFICANT CHANGE UP (ref 232–1245)
WBC # BLD: 4.3 K/UL — LOW (ref 4.8–10.8)
WBC # FLD AUTO: 4.3 K/UL — LOW (ref 4.8–10.8)

## 2024-05-23 PROCEDURE — 99223 1ST HOSP IP/OBS HIGH 75: CPT | Mod: 57

## 2024-05-23 PROCEDURE — 95816 EEG AWAKE AND DROWSY: CPT | Mod: 26

## 2024-05-23 PROCEDURE — 99223 1ST HOSP IP/OBS HIGH 75: CPT

## 2024-05-23 RX ADMIN — Medication 81 MILLIGRAM(S): at 12:19

## 2024-05-23 RX ADMIN — DONEPEZIL HYDROCHLORIDE 10 MILLIGRAM(S): 10 TABLET, FILM COATED ORAL at 21:17

## 2024-05-23 RX ADMIN — MONTELUKAST 10 MILLIGRAM(S): 4 TABLET, CHEWABLE ORAL at 12:19

## 2024-05-23 RX ADMIN — ENOXAPARIN SODIUM 40 MILLIGRAM(S): 100 INJECTION SUBCUTANEOUS at 06:28

## 2024-05-23 RX ADMIN — ESCITALOPRAM OXALATE 30 MILLIGRAM(S): 10 TABLET, FILM COATED ORAL at 12:19

## 2024-05-23 RX ADMIN — Medication 650 MILLIGRAM(S): at 12:35

## 2024-05-23 RX ADMIN — CLOPIDOGREL BISULFATE 75 MILLIGRAM(S): 75 TABLET, FILM COATED ORAL at 12:20

## 2024-05-23 RX ADMIN — Medication 100 MILLIGRAM(S): at 12:19

## 2024-05-23 RX ADMIN — Medication 1 MILLIGRAM(S): at 12:19

## 2024-05-23 RX ADMIN — Medication 650 MILLIGRAM(S): at 12:19

## 2024-05-23 RX ADMIN — ATORVASTATIN CALCIUM 80 MILLIGRAM(S): 80 TABLET, FILM COATED ORAL at 21:18

## 2024-05-23 RX ADMIN — LISINOPRIL 5 MILLIGRAM(S): 2.5 TABLET ORAL at 06:28

## 2024-05-23 NOTE — CONSULT NOTE ADULT - ASSESSMENT
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 She amb 50 ft with cg assist. She needs help with ADLs.     PRECAUTIONS: [x  ] Cardiac  [  ] Respiratory  [  ] Seizures [  ] Contact Isolation  [  ] Droplet Isolation  [  ] Other    Weight Bearing Status:     RECOMMENDATION:    Out of Bed to Chair     DVT/Decubiti Prophylaxis    REHAB PLAN:     [ x  ] Bedside P/T 3-5 times a week   [ x ]   Bedside O/T  2-3 times a week             [   ] No Rehab Therapy Indicated                   [   ]  Speech Therapy   Conditioning/ROM                                    ADL  Bed Mobility                                               Conditioning/ROM  Transfers                                                     Bed Mobility  Sitting /Standing Balance                         Transfers                                        Gait Training                                               Sitting/Standing Balance  Stair Training [   ]Applicable                    Home equipment Eval                                                                        Splinting  [   ] Only      GOALS:   ADL   [   ]   Independent                    Transfers  [   ] Independent                          Ambulation  [   ] Independent     [    ] With device                            [   ]  CG                                                         [   ]  CG                                                                  [   ] CG                            [    ] Min A                                                   [   ] Min A                                                              [   ] Min  A          DISCHARGE PLAN:   [ xx  ]  Good candidate for Intensive Rehabilitation/Hospital based-4A [facility]                                             Will tolerate 3hrs Intensive Rehab Daily                                       [    ]  Short Term Rehab in Skilled Nursing Facility                                       [    ]  Home with Outpatient or VN services                                         [    ]  Possible Candidate for Intensive Hospital based Rehab                                        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 She amb 50 ft with cg assist. She needs help with ADLs. Comorbidities include CAD, S/P CABG, asthma, alcohol use disorder. The patient requires acute rehab with 3 hours of daily therapies at least 5 out of 7 days and close physiatry follow up.     PRECAUTIONS: [x  ] Cardiac  [  ] Respiratory  [  ] Seizures [  ] Contact Isolation  [  ] Droplet Isolation  [  ] Other    Weight Bearing Status:     RECOMMENDATION:    Out of Bed to Chair     DVT/Decubiti Prophylaxis    REHAB PLAN:     [ x  ] Bedside P/T 3-5 times a week   [ x ]   Bedside O/T  2-3 times a week             [   ] No Rehab Therapy Indicated                   [   ]  Speech Therapy   Conditioning/ROM                                    ADL  Bed Mobility                                               Conditioning/ROM  Transfers                                                     Bed Mobility  Sitting /Standing Balance                         Transfers                                        Gait Training                                               Sitting/Standing Balance  Stair Training [   ]Applicable                    Home equipment Eval                                                                        Splinting  [   ] Only      GOALS:   ADL   [x   ]   Independent                    Transfers  [ x  ] Independent                          Ambulation  [ x  ] Independent     [  x  ] With device                            [   ]  CG                                                         [   ]  CG                                                                  [   ] CG                            [    ] Min A                                                   [   ] Min A                                                              [   ] Min  A          DISCHARGE PLAN:   [ xx  ]  Good candidate for Intensive Rehabilitation/Hospital based-4A [facility]                                             Will tolerate 3hrs Intensive Rehab Daily                                       [    ]  Short Term Rehab in Skilled Nursing Facility                                       [    ]  Home with Outpatient or VN services                                         [    ]  Possible Candidate for Intensive Hospital based Rehab

## 2024-05-23 NOTE — PHYSICAL THERAPY INITIAL EVALUATION ADULT - PERTINENT HX OF CURRENT PROBLEM, REHAB EVAL
Patient is an 89 y/o F with PMHx of CABG, dementia, asthma, alcohol use disorder, and recent CVA with right leg weakness (on 5/19/2024) that presented to the ED on 5/22 with generalized weakness s/p two falls at home. Patient admitted for further workup s/p fall. Trauma workup on admission unremarkable, no fractures

## 2024-05-23 NOTE — CONSULT NOTE ADULT - ATTENDING COMMENTS
complex patient  Cryptogenic stroke   recommend ILR  I discussed with patient the placement of an implantable loop recorder for long-term detection of atrial tachyarrhythmias including Atrial Fibrillation, as a possible cause of cryptogenic stroke. I explained to patient in great details, that in case of presence of atrial fibrillation, full dose anticoagulation needs to be started. I explained risks, benefits, alternatives, the nature of the procedure, and follow up care after device is implanted. We also discussed remote monitoring in detail, including monthly billing, and the possibility of copays/deductibles. I also discussed the risks of bleeding, hematoma, infection, device malfunction, device recalls/advisory, and sensitivity to cardiac monitor material.  Patient/family expressed understanding of discussion and agreement to proceed with the implantation of a loop recorder.

## 2024-05-23 NOTE — PHYSICAL THERAPY INITIAL EVALUATION ADULT - GENERAL OBSERVATIONS, REHAB EVAL
10:25-10:50am Pt encountered in semi-tolentino position in bed, A & O x 3 in NAD, no c/o pain and agreeable to PT. Pt requires Min A in bed mobility, Min A/CGA in transfer activity and ambulated 50 ft CGA using RW with decreased danny. Pt demonstrated minimal gait instability during ambulation secondary to weakness and unable to perform stair training as this time due to reports of inc fatigue after ambulation. Pt will benefit from skilled PT 3-5x/wk for thera ex, functional mobility training, balance and gait training to increase mobility and return to previous level of function.

## 2024-05-23 NOTE — EEG REPORT - NS EEG TEXT BOX
[facility] Department of Neurology  Inpatient Routine-EEG Report      Patient Name:	KILO MENA    :	1935  MRN:	[id number]  Study Date/Time:	2024, 4:09:15 AM  Referred by:	-    Brief Clinical History:  KILO MENA is a 88 year old -; study performed to investigate for seizures or markers of epilepsy.   Diagnosis Code: R40.4 Transient alteration of awareness      Patient Medication:  plavix    ecotrin    lovenox    tylenol    lipitor    aricept    lexapro    folic macid    zestril    ativan    melatonin    singulair      Acquisition Details:  Electroencephalography was acquired using a minimum of 21 channels on an [employer] system v 9.3.1 with electrode placement according to the standard International 10-20 system following ACNS (American Clinical Neurophysiology Society) guidelines.  Anterior temporal T1 and T2 electrodes were utilized whenever possible.   The [employer] automated spike & seizure detections were all reviewed in detail, in addition to the entire raw EEG.    Findings:  Background:  continuous.   Voltage:  Normal (20uV)  Organization:  Appropriate anterior-posterior gradient  Posterior Dominant Rhythm:  10 Hz symmetric, well-organized, and well-modulated  Variability:  Yes	Reactivity:  Yes  Sleep:  Absent.  Focal abnormalities:  No persistent asymmetries of voltage or frequency.  Interictal Activity:  None  Focal Slowing: Subtle  Left Hemispheric  Generalized Slowing:  No  Events:  1)	No electrographic seizures or significant clinical events.  Provocations:  1)	Hyperventilation: was not performed.  2)	Photic stimulation: was not performed.  Impression:  Abnormal due to focal slowing as above    Clinical Correlation:  Focal electrocerebral dysfunction secondary to nonspecific etiology    Gio Templeton MD  Attending Neurologist, Division of Epilepsy

## 2024-05-23 NOTE — OCCUPATIONAL THERAPY INITIAL EVALUATION ADULT - ADL RETRAINING, OT EVAL
By discharge, Pt will perform lower body dressing with supervision. By discharge, Pt will be educated on use of built up handled utensils to increase independence in self-feeding

## 2024-05-23 NOTE — CONSULT NOTE ADULT - ASSESSMENT
87 yo F with PMhx of Dementia, CAD s/p CABG in the past, CVA with right sided weakness last week now presented with generalized weakness. EP consulted for possible ILR for CVA w/up.     IMPRESSION   87 yo F with PMhx of Dementia, CAD s/p CABG in the past, CVA with right sided weakness last week now presented with generalized weakness. EP consulted for possible ILR for CVA w/up.     IMPRESSION  Recent ischemic stroke unknown etiology  CAD s/p CABG  Dementia  Generalize weakness    RECOMMENDATIONS  telemonitoring   check TTE with bubble study  c/w antiplatelets and statins  discussed risks and benefits of ILR, pt agreeable  snider schedule for ILR placement tomorrow

## 2024-05-23 NOTE — PATIENT PROFILE ADULT - FALL HARM RISK - HARM RISK INTERVENTIONS
Assistance with ambulation/Assistance OOB with selected safe patient handling equipment/Communicate Risk of Fall with Harm to all staff/Monitor for mental status changes/Monitor gait and stability/Reinforce activity limits and safety measures with patient and family/Tailored Fall Risk Interventions/Toileting schedule using arm’s reach rule for commode and bathroom/Use of alarms - bed, chair and/or voice tab/Visual Cue: Yellow wristband and red socks/Bed in lowest position, wheels locked, appropriate side rails in place/Call bell, personal items and telephone in reach/Instruct patient to call for assistance before getting out of bed or chair/Non-slip footwear when patient is out of bed/Page to call system/Physically safe environment - no spills, clutter or unnecessary equipment/Purposeful Proactive Rounding/Room/bathroom lighting operational, light cord in reach

## 2024-05-23 NOTE — OCCUPATIONAL THERAPY INITIAL EVALUATION ADULT - FINE MOTOR COORDINATION TRAINING, OT EVAL
By discharge, pt will be educated on RUE C exercises to perform to promote increased right hand function for performing self care tasks/self feeding

## 2024-05-23 NOTE — OCCUPATIONAL THERAPY INITIAL EVALUATION ADULT - ADDITIONAL COMMENTS
Prior to recent stroke (5/19), Pt reports being independent in ADLS and transfers. Pt a poor historian of events from recent hospital discharge for CVA (on 5/21) and readmission for falls (5/22). Pt reports she recently got a tub chair and her kids "brought a cane down from the attic". When asked about preparing foods at home post hospital discharge, Pt reports she has not been eating, has not been hungry.

## 2024-05-23 NOTE — OCCUPATIONAL THERAPY INITIAL EVALUATION ADULT - LEVEL OF INDEPENDENCE: EATING, OT EVAL
Pt with RUE GMC deficits impacting self feeding. Pt with difficulty coordinating using right hand to bring food to mouth. Pt uses left non-domiant hand with increased time. Pt requires assistance to cut foods RN made aware./minimum assist (75% patients effort) Pt with RUE motor planning deficits impacting self feeding. Pt with difficulty coordinating using right hand to bring food to mouth. Pt uses left non-domiant hand with increased time. Pt requires assistance to cut foods RN made aware./minimum assist (75% patients effort)

## 2024-05-23 NOTE — OCCUPATIONAL THERAPY INITIAL EVALUATION ADULT - MOTOR COORDINATION TRAINING, PT EVAL
By discharge, pt will be educated on RUE exercises to perform to promote RUE GMC for self care tasks/self-feeding

## 2024-05-23 NOTE — OCCUPATIONAL THERAPY INITIAL EVALUATION ADULT - PERTINENT HX OF CURRENT PROBLEM, REHAB EVAL
Patient is an 89 y/o F with PMHx of CABG, dementia, asthma, alcohol use disorder, and recent CVA with right leg weakness (on 5/19/2024) that presented to the ED on 5/22 with generalized weakness s/p two falls at home. Patient was discharged yesterday 5/21 after admission for CVA. History obtained from patient's son David (HCP) on the phone; states that [facility] was at the patient's home evaluating her for home PT/OT. Noticed that patient was unsteady on her feet, and while speaking to son on the phone, patient fell from standing position onto her knees onto a carpeted floor, no head injury or LOC. Reports patient is on ASA and Plavix for recent stroke but no other AC. Decision was made to call EMS; when EMS arrived and was beginning transport into [facility], patient once again fell from standing position onto her knees, no head injury or LOC. Patient denies headache, dizziness, vision changes, worsening extremity weakness, chest pain, shortness of breath, nausea, vomiting, abdominal pain, diarrhea, dysuria.

## 2024-05-23 NOTE — CONSULT NOTE ADULT - SUBJECTIVE AND OBJECTIVE BOX
HPI:  Patient is an 89 y/o F with PMHx of CABG, dementia, asthma, alcohol use disorder, and recent CVA with right leg weakness (on 5/19/2024) that presented to the ED on 5/22 with generalized weakness s/p two falls at home. Patient was discharged yesterday 5/21 after admission for CVA. History obtained from patient's son David (HCP) on the phone; states that [facility] was at the patient's home evaluating her for home PT/OT. Noticed that patient was unsteady on her feet, and while speaking to son on the phone, patient fell from standing position onto her knees onto a carpeted floor, no head injury or LOC. Reports patient is on ASA and Plavix for recent stroke but no other AC. Decision was made to call EMS; when EMS arrived and was beginning transport into [facility], patient once again fell from standing position onto her knees, no head injury or LOC. Patient denies headache, dizziness, vision changes, worsening extremity weakness, chest pain, shortness of breath, nausea, vomiting, abdominal pain, diarrhea, dysuria.    Vitals on admission: TMax 98.7, HR 60, /73 (on recheck 193/76 --> given Labetalol 10 mg IVP x1), O2 Sat 97% on RA  Labs on admission: AST 79, otherwise unremarkable  Imaging on admission: Trauma workup unremarkable.   - CT Head Non Con: Stable findings. No acute intracranial pathology.  - CT C-Spine: No acute cervical fracture or dislocation. Multilevel degenerative changes.  - XRay Pelvis: No acute fracture or acute articular abnormality.  - CXR: No radiographic evidence of acute cardiopulmonary disease.    Patient admitted for further workup s/p fall.  (22 May 2024 20:59)      PAST MEDICAL & SURGICAL HISTORY:  CAD (coronary artery disease)      Known health problems: none          Hospital Course:    TODAY'S SUBJECTIVE & REVIEW OF SYMPTOMS:     Constitutional WNL   Cardio WNL   Resp WNL   GI WNL  Heme WNL  Endo WNL  Skin WNL  MSK WNL  Neuro WNL  Cognitive WNL  Psych WNL      MEDICATIONS  (STANDING):  aspirin enteric coated 81 milliGRAM(s) Oral daily  atorvastatin 80 milliGRAM(s) Oral at bedtime  clopidogrel Tablet 75 milliGRAM(s) Oral daily  donepezil 10 milliGRAM(s) Oral at bedtime  enoxaparin Injectable 40 milliGRAM(s) SubCutaneous every 24 hours  escitalopram 30 milliGRAM(s) Oral daily  folic acid 1 milliGRAM(s) Oral daily  lisinopril 5 milliGRAM(s) Oral daily  montelukast 10 milliGRAM(s) Oral daily  thiamine 100 milliGRAM(s) Oral daily    MEDICATIONS  (PRN):  acetaminophen     Tablet .. 650 milliGRAM(s) Oral every 6 hours PRN Temp greater or equal to 38C (100.4F), Mild Pain (1 - 3)  LORazepam     Tablet 1 milliGRAM(s) Oral every 2 hours PRN CIWA-Ar score increase by 2 points and a total score of 7 or less  melatonin 3 milliGRAM(s) Oral at bedtime PRN Insomnia      FAMILY HISTORY:  FH: HTN (hypertension) (Father, Mother)        Allergies    No Known Allergies    Intolerances        SOCIAL HISTORY:    [x  ] Etoh-4-5 glasses of wine daily  [  ] Smoking  [  ] Substance abuse     Home Environment:  [ x ] Home Alone  [  ] Lives with Family  [  ] Home Health Aid    Dwelling:  [  ] Apartment  [x  ] Private House  [  ] Adult Home  [  ] Skilled Nursing Facility      [  ] Short Term  [  ] Long Term  [ x ] Stairs-1 AYDEN and a step inside        Elevator [  ]    FUNCTIONAL STATUS PTA: (Check all that apply)  Ambulation: [  x ]Independent    [  ] Dependent     [  ] Non-Ambulatory  Assistive Device: [  ] SA Cane  [  ]  Q Cane  [  ] Walker  [  ]  Wheelchair  ADL : [ x ] Independent  [  ]  Dependent       Vital Signs Last 24 Hrs  T(C): 36.8 (23 May 2024 12:36), Max: 38.2 (23 May 2024 00:05)  T(F): 98.2 (23 May 2024 12:36), Max: 100.7 (23 May 2024 00:05)  HR: 60 (23 May 2024 12:36) (60 - 66)  BP: 118/74 (23 May 2024 12:36) (118/74 - 193/76)  BP(mean): --  RR: 18 (23 May 2024 12:36) (18 - 19)  SpO2: 92% (23 May 2024 12:36) (92% - 95%)    Parameters below as of 23 May 2024 00:05  Patient On (Oxygen Delivery Method): room air          PHYSICAL EXAM: Alert & Oriented X3  GENERAL: NAD, well-groomed, well-developed  HEAD:  Atraumatic, Normocephalic  EYES: EOMI, PERRLA, conjunctiva and sclera clear  NECK: Supple, No JVD, Normal thyroid  CHEST/LUNG: Clear bilaterally; No rales, rhonchi, wheezing, or rubs  HEART: Regular rate and rhythm; No murmurs, rubs, or gallops  ABDOMEN: Soft, Nontender, Nondistended; Bowel sounds present  EXTREMITIES:  2+ Peripheral Pulses, No clubbing, cyanosis, or edema    NERVOUS SYSTEM:  Cranial Nerves 2-12 intact [x  ] Abnormal  [  ]  ROM: WFL all extremities [  ]  Abnormal [  ]  Motor Strength: WFL all extremities  [  ]  Abnormal [x  ] 5/5; however, decreased rapid alternating movements and coordination right upper and lower extremity   Sensation: intact to light touch [ x ] Abnormal [  ]  Reflexes: Symmetric [  ]  Abnormal [  ]    FUNCTIONAL STATUS:  Bed Mobility: Independent [  ]  Supervision [  ]  Needs Assistance [  ]  N/A [  ]  Transfers: Independent [  ]  Supervision [  ]  Needs Assistance [  ]  N/A [  ]   Ambulation: Independent [  ]  Supervision [  ]  Needs Assistance [  ]  N/A [  ]  ADL: Independent [  ] Requires Assistance [  ] N/A [  ]      LABS:                        13.7   4.30  )-----------( 241      ( 23 May 2024 09:59 )             41.8     05-23    136  |  102  |  16  ----------------------------<  135<H>  3.8   |  17  |  0.9    Ca    9.3      23 May 2024 09:59  Mg     2.1     05-23    TPro  7.3  /  Alb  4.7  /  TBili  0.3  /  DBili  x   /  AST  109<H>  /  ALT  73<H>  /  AlkPhos  82  05-23    PT/INR - ( 22 May 2024 14:13 )   PT: 12.70 sec;   INR: 1.11 ratio         PTT - ( 22 May 2024 14:13 )  PTT:26.5 sec  Urinalysis Basic - ( 23 May 2024 09:59 )    Color: x / Appearance: x / SG: x / pH: x  Gluc: 135 mg/dL / Ketone: x  / Bili: x / Urobili: x   Blood: x / Protein: x / Nitrite: x   Leuk Esterase: x / RBC: x / WBC x   Sq Epi: x / Non Sq Epi: x / Bacteria: x        RADIOLOGY & ADDITIONAL STUDIES:    Assesment:
HPI:  Patient is an 87 y/o F with PMHx of CABG, dementia, asthma, alcohol use disorder, and recent CVA with right leg weakness (on 5/19/2024) that presented to the ED on 5/22 with generalized weakness s/p two falls at home. Patient was discharged yesterday 5/21 after admission for CVA. History obtained from patient's son David (HCP) on the phone; states that [facility] was at the patient's home evaluating her for home PT/OT. Noticed that patient was unsteady on her feet, and while speaking to son on the phone, patient fell from standing position onto her knees onto a carpeted floor, no head injury or LOC. Reports patient is on ASA and Plavix for recent stroke but no other AC. Decision was made to call EMS; when EMS arrived and was beginning transport into [facility], patient once again fell from standing position onto her knees, no head injury or LOC. Patient denies headache, dizziness, vision changes, worsening extremity weakness, chest pain, shortness of breath, nausea, vomiting, abdominal pain, diarrhea, dysuria.    Vitals on admission: TMax 98.7, HR 60, /73 (on recheck 193/76 --> given Labetalol 10 mg IVP x1), O2 Sat 97% on RA  Labs on admission: AST 79, otherwise unremarkable  Imaging on admission: Trauma workup unremarkable.   - CT Head Non Con: Stable findings. No acute intracranial pathology.  - CT C-Spine: No acute cervical fracture or dislocation. Multilevel degenerative changes.  - XRay Pelvis: No acute fracture or acute articular abnormality.  - CXR: No radiographic evidence of acute cardiopulmonary disease.    Patient admitted for further workup s/p fall.  (22 May 2024 20:59)      ---  cardio fellow additional notes:    87 yo F with PMhx of Dementia, CAD s/p CABG in the past, CVA with right sided weakness last week now presented with generalized weakness. EP consulted for possible ILR for CVA w/up.     PAST MEDICAL & SURGICAL HISTORY  CAD (coronary artery disease)    Known health problems: none        FAMILY HISTORY:  FAMILY HISTORY:  FH: HTN (hypertension) (Father, Mother)        SOCIAL HISTORY:  Social History:      ALLERGIES:  No Known Allergies      MEDICATIONS:  aspirin enteric coated 81 milliGRAM(s) Oral daily  atorvastatin 80 milliGRAM(s) Oral at bedtime  clopidogrel Tablet 75 milliGRAM(s) Oral daily  donepezil 10 milliGRAM(s) Oral at bedtime  enoxaparin Injectable 40 milliGRAM(s) SubCutaneous every 24 hours  escitalopram 30 milliGRAM(s) Oral daily  folic acid 1 milliGRAM(s) Oral daily  lisinopril 5 milliGRAM(s) Oral daily  montelukast 10 milliGRAM(s) Oral daily  thiamine 100 milliGRAM(s) Oral daily    PRN:  acetaminophen     Tablet .. 650 milliGRAM(s) Oral every 6 hours PRN  LORazepam     Tablet 1 milliGRAM(s) Oral every 2 hours PRN  melatonin 3 milliGRAM(s) Oral at bedtime PRN      HOME MEDICATIONS:  Home Medications:  Aricept 10 mg oral tablet: 1 tab(s) orally once a day (22 May 2024 22:50)  escitalopram 20 mg oral tablet: 1.5 tab(s) orally once a day (22 May 2024 22:50)  montelukast 10 mg oral tablet: 1 tab(s) orally once a day (22 May 2024 22:49)      VITALS:   T(F): 99.1 (05-23 @ 05:05), Max: 100.7 (05-23 @ 00:05)  HR: 60 (05-23 @ 05:05) (54 - 85)  BP: 178/79 (05-23 @ 05:05) (138/75 - 204/86)  BP(mean): 110 (05-20 @ 18:05) (110 - 110)  RR: 19 (05-23 @ 05:05) (18 - 19)  SpO2: 95% (05-23 @ 05:00) (95% - 98%)      REVIEW OF SYSTEMS:  CONSTITUTIONAL: generalized weakness  HEENT: No visual changes, neck/ear pain  RESPIRATORY: No cough   CARDIOVASCULAR: See HPI  GASTROINTESTINAL: No abdominal pain. No nausea, vomiting, diarrhea   GENITOURINARY: No dysuria, frequency or hematuria  NEUROLOGICAL: Right sided weakness  SKIN: No new rashes    PHYSICAL EXAM:  General: Not in distress.    HEENT: EOMI  Cardio: regular, S1, S2   Pulm: B/L BS.     Abdomen: Soft, non-tender, non-distended. Normoactive bowel sounds  Extremities: No edema b/l le  Neuro: A&O x3.      LABS:                        13.7   4.30  )-----------( 241      ( 23 May 2024 09:59 )             41.8     05-23    136  |  102  |  16  ----------------------------<  135<H>  3.8   |  17  |  0.9    Ca    9.3      23 May 2024 09:59  Mg     2.1     05-23    TPro  7.3  /  Alb  4.7  /  TBili  0.3  /  DBili  x   /  AST  109<H>  /  ALT  73<H>  /  AlkPhos  82  05-23    PT/INR - ( 22 May 2024 14:13 )   PT: 12.70 sec;   INR: 1.11 ratio         PTT - ( 22 May 2024 14:13 )  PTT:26.5 sec      05-23 Chol 193 LDL -- HDL 67 Trig 100, 05-20 Chol 209<H> LDL -- HDL 69 Trig 171<H>      RADIOLOGY:  -CXR: < from: Xray Chest 1 View- PORTABLE-Urgent (Xray Chest 1 View- PORTABLE-Urgent .) (05.22.24 @ 14:09) >  Impression:    No radiographic evidence of acute cardiopulmonary disease.        --- End of Report ---    < end of copied text >    -TTE: NA  -CCTA: NA  -STRESS TEST: NA  -CATHETERIZATION: NA  -OTHER:  ECG:  < from: 12 Lead ECG (05.19.24 @ 11:38) >  Diagnosis Line Sinus bradycardiawith occasional Premature ventricular complexes  Right bundle branch block  T wave abnormality, consider lateral ischemia  Abnormal ECG    < end of copied text >      TELEMETRY EVENTS: NA

## 2024-05-23 NOTE — OCCUPATIONAL THERAPY INITIAL EVALUATION ADULT - GENERAL OBSERVATIONS, REHAB EVAL
Pt encountered and left semi-reclined in bed in NAD, +call bell, +bed alarm. A+O to self and place. Pt with min/mod GMC impairments RUE impacting function. Pt with 2 loose BMs during IE STEPHIE hernadez. Pt encountered and left semi-reclined in bed in NAD, +call bell, +bed alarm. A+O to self and place. Pt with RUE motor planning impairment impacting function. Pt with 2 loose BMs during IE STEPHIE hernadez.

## 2024-05-24 ENCOUNTER — INPATIENT (INPATIENT)
Facility: HOSPITAL | Age: 89
LOS: 9 days | Discharge: NURSING, REHAB OR LONG-TERM CARE | DRG: 42 | End: 2024-06-03
Attending: PHYSICAL MEDICINE & REHABILITATION | Admitting: PHYSICAL MEDICINE & REHABILITATION
Payer: MEDICARE

## 2024-05-24 ENCOUNTER — TRANSCRIPTION ENCOUNTER (OUTPATIENT)
Age: 89
End: 2024-05-24

## 2024-05-24 VITALS
WEIGHT: 138.45 LBS | OXYGEN SATURATION: 96 % | DIASTOLIC BLOOD PRESSURE: 72 MMHG | SYSTOLIC BLOOD PRESSURE: 133 MMHG | HEIGHT: 61.5 IN | HEART RATE: 64 BPM | TEMPERATURE: 99 F | RESPIRATION RATE: 18 BRPM

## 2024-05-24 VITALS
DIASTOLIC BLOOD PRESSURE: 81 MMHG | RESPIRATION RATE: 18 BRPM | OXYGEN SATURATION: 95 % | SYSTOLIC BLOOD PRESSURE: 127 MMHG | TEMPERATURE: 98 F | HEART RATE: 75 BPM

## 2024-05-24 DIAGNOSIS — I63.9 CEREBRAL INFARCTION, UNSPECIFIED: ICD-10-CM

## 2024-05-24 DIAGNOSIS — Z95.1 PRESENCE OF AORTOCORONARY BYPASS GRAFT: Chronic | ICD-10-CM

## 2024-05-24 LAB
ANION GAP SERPL CALC-SCNC: 14 MMOL/L — SIGNIFICANT CHANGE UP (ref 7–14)
BUN SERPL-MCNC: 22 MG/DL — HIGH (ref 10–20)
CALCIUM SERPL-MCNC: 9.1 MG/DL — SIGNIFICANT CHANGE UP (ref 8.4–10.4)
CHLORIDE SERPL-SCNC: 107 MMOL/L — SIGNIFICANT CHANGE UP (ref 98–110)
CO2 SERPL-SCNC: 17 MMOL/L — SIGNIFICANT CHANGE UP (ref 17–32)
CREAT SERPL-MCNC: 1 MG/DL — SIGNIFICANT CHANGE UP (ref 0.7–1.5)
EGFR: 55 ML/MIN/1.73M2 — LOW
GLUCOSE SERPL-MCNC: 92 MG/DL — SIGNIFICANT CHANGE UP (ref 70–99)
HCT VFR BLD CALC: 41.8 % — SIGNIFICANT CHANGE UP (ref 37–47)
HGB BLD-MCNC: 13.9 G/DL — SIGNIFICANT CHANGE UP (ref 12–16)
MCHC RBC-ENTMCNC: 30.9 PG — SIGNIFICANT CHANGE UP (ref 27–31)
MCHC RBC-ENTMCNC: 33.3 G/DL — SIGNIFICANT CHANGE UP (ref 32–37)
MCV RBC AUTO: 92.9 FL — SIGNIFICANT CHANGE UP (ref 81–99)
NRBC # BLD: 0 /100 WBCS — SIGNIFICANT CHANGE UP (ref 0–0)
PLATELET # BLD AUTO: 232 K/UL — SIGNIFICANT CHANGE UP (ref 130–400)
PMV BLD: 10.2 FL — SIGNIFICANT CHANGE UP (ref 7.4–10.4)
POTASSIUM SERPL-MCNC: 4.1 MMOL/L — SIGNIFICANT CHANGE UP (ref 3.5–5)
POTASSIUM SERPL-SCNC: 4.1 MMOL/L — SIGNIFICANT CHANGE UP (ref 3.5–5)
RBC # BLD: 4.5 M/UL — SIGNIFICANT CHANGE UP (ref 4.2–5.4)
RBC # FLD: 14.7 % — HIGH (ref 11.5–14.5)
SODIUM SERPL-SCNC: 138 MMOL/L — SIGNIFICANT CHANGE UP (ref 135–146)
WBC # BLD: 3.57 K/UL — LOW (ref 4.8–10.8)
WBC # FLD AUTO: 3.57 K/UL — LOW (ref 4.8–10.8)

## 2024-05-24 PROCEDURE — C1764: CPT

## 2024-05-24 PROCEDURE — 33285 INSJ SUBQ CAR RHYTHM MNTR: CPT

## 2024-05-24 PROCEDURE — 97110 THERAPEUTIC EXERCISES: CPT | Mod: GO

## 2024-05-24 PROCEDURE — 97535 SELF CARE MNGMENT TRAINING: CPT | Mod: GO

## 2024-05-24 PROCEDURE — 97530 THERAPEUTIC ACTIVITIES: CPT | Mod: GO

## 2024-05-24 PROCEDURE — 80053 COMPREHEN METABOLIC PANEL: CPT

## 2024-05-24 PROCEDURE — 83735 ASSAY OF MAGNESIUM: CPT

## 2024-05-24 PROCEDURE — 97162 PT EVAL MOD COMPLEX 30 MIN: CPT | Mod: GP

## 2024-05-24 PROCEDURE — 85025 COMPLETE CBC W/AUTO DIFF WBC: CPT

## 2024-05-24 PROCEDURE — 92610 EVALUATE SWALLOWING FUNCTION: CPT | Mod: GN

## 2024-05-24 PROCEDURE — 90834 PSYTX W PT 45 MINUTES: CPT

## 2024-05-24 PROCEDURE — 90791 PSYCH DIAGNOSTIC EVALUATION: CPT

## 2024-05-24 PROCEDURE — 36415 COLL VENOUS BLD VENIPUNCTURE: CPT

## 2024-05-24 PROCEDURE — 99239 HOSP IP/OBS DSCHRG MGMT >30: CPT

## 2024-05-24 PROCEDURE — 92523 SPEECH SOUND LANG COMPREHEN: CPT | Mod: GN

## 2024-05-24 PROCEDURE — 93306 TTE W/DOPPLER COMPLETE: CPT

## 2024-05-24 PROCEDURE — 96132 NRPSYC TST EVAL PHYS/QHP 1ST: CPT

## 2024-05-24 PROCEDURE — 97542 WHEELCHAIR MNGMENT TRAINING: CPT | Mod: GO

## 2024-05-24 PROCEDURE — 92507 TX SP LANG VOICE COMM INDIV: CPT | Mod: GN

## 2024-05-24 PROCEDURE — 97116 GAIT TRAINING THERAPY: CPT | Mod: GP

## 2024-05-24 PROCEDURE — 97112 NEUROMUSCULAR REEDUCATION: CPT | Mod: GO

## 2024-05-24 RX ORDER — LANOLIN ALCOHOL/MO/W.PET/CERES
1 CREAM (GRAM) TOPICAL
Qty: 0 | Refills: 0 | DISCHARGE
Start: 2024-05-24

## 2024-05-24 RX ORDER — ENOXAPARIN SODIUM 100 MG/ML
0 INJECTION SUBCUTANEOUS
Qty: 0 | Refills: 0 | DISCHARGE
Start: 2024-05-24

## 2024-05-24 RX ORDER — ATORVASTATIN CALCIUM 80 MG/1
80 TABLET, FILM COATED ORAL AT BEDTIME
Refills: 0 | Status: DISCONTINUED | OUTPATIENT
Start: 2024-05-24 | End: 2024-06-03

## 2024-05-24 RX ORDER — ESCITALOPRAM OXALATE 10 MG/1
30 TABLET, FILM COATED ORAL DAILY
Refills: 0 | Status: DISCONTINUED | OUTPATIENT
Start: 2024-05-24 | End: 2024-06-03

## 2024-05-24 RX ORDER — ENOXAPARIN SODIUM 100 MG/ML
40 INJECTION SUBCUTANEOUS EVERY 24 HOURS
Refills: 0 | Status: DISCONTINUED | OUTPATIENT
Start: 2024-05-25 | End: 2024-06-03

## 2024-05-24 RX ORDER — CLOPIDOGREL BISULFATE 75 MG/1
75 TABLET, FILM COATED ORAL DAILY
Refills: 0 | Status: DISCONTINUED | OUTPATIENT
Start: 2024-05-25 | End: 2024-06-03

## 2024-05-24 RX ORDER — THIAMINE MONONITRATE (VIT B1) 100 MG
100 TABLET ORAL DAILY
Refills: 0 | Status: DISCONTINUED | OUTPATIENT
Start: 2024-05-24 | End: 2024-06-03

## 2024-05-24 RX ORDER — ACETAMINOPHEN 500 MG
650 TABLET ORAL EVERY 6 HOURS
Refills: 0 | Status: DISCONTINUED | OUTPATIENT
Start: 2024-05-24 | End: 2024-06-03

## 2024-05-24 RX ORDER — PANTOPRAZOLE SODIUM 20 MG/1
40 TABLET, DELAYED RELEASE ORAL
Refills: 0 | Status: DISCONTINUED | OUTPATIENT
Start: 2024-05-24 | End: 2024-06-03

## 2024-05-24 RX ORDER — THIAMINE MONONITRATE (VIT B1) 100 MG
1 TABLET ORAL
Qty: 0 | Refills: 0 | DISCHARGE
Start: 2024-05-24

## 2024-05-24 RX ORDER — FOLIC ACID 0.8 MG
1 TABLET ORAL
Qty: 0 | Refills: 0 | DISCHARGE
Start: 2024-05-24

## 2024-05-24 RX ORDER — CEPHALEXIN 500 MG
500 CAPSULE ORAL ONCE
Refills: 0 | Status: COMPLETED | OUTPATIENT
Start: 2024-05-24 | End: 2024-05-24

## 2024-05-24 RX ORDER — DONEPEZIL HYDROCHLORIDE 10 MG/1
10 TABLET, FILM COATED ORAL AT BEDTIME
Refills: 0 | Status: DISCONTINUED | OUTPATIENT
Start: 2024-05-24 | End: 2024-06-03

## 2024-05-24 RX ORDER — CHLORHEXIDINE GLUCONATE 213 G/1000ML
1 SOLUTION TOPICAL
Refills: 0 | Status: DISCONTINUED | OUTPATIENT
Start: 2024-05-24 | End: 2024-06-03

## 2024-05-24 RX ORDER — LANOLIN ALCOHOL/MO/W.PET/CERES
3 CREAM (GRAM) TOPICAL AT BEDTIME
Refills: 0 | Status: DISCONTINUED | OUTPATIENT
Start: 2024-05-24 | End: 2024-06-03

## 2024-05-24 RX ORDER — ASPIRIN/CALCIUM CARB/MAGNESIUM 324 MG
81 TABLET ORAL DAILY
Refills: 0 | Status: DISCONTINUED | OUTPATIENT
Start: 2024-05-24 | End: 2024-06-03

## 2024-05-24 RX ORDER — MONTELUKAST 4 MG/1
10 TABLET, CHEWABLE ORAL DAILY
Refills: 0 | Status: DISCONTINUED | OUTPATIENT
Start: 2024-05-24 | End: 2024-06-03

## 2024-05-24 RX ORDER — ENOXAPARIN SODIUM 100 MG/ML
40 INJECTION SUBCUTANEOUS
Qty: 1 | Refills: 0
Start: 2024-05-24 | End: 2024-05-28

## 2024-05-24 RX ORDER — LISINOPRIL 2.5 MG/1
5 TABLET ORAL DAILY
Refills: 0 | Status: DISCONTINUED | OUTPATIENT
Start: 2024-05-24 | End: 2024-05-28

## 2024-05-24 RX ORDER — FOLIC ACID 0.8 MG
1 TABLET ORAL DAILY
Refills: 0 | Status: DISCONTINUED | OUTPATIENT
Start: 2024-05-24 | End: 2024-06-03

## 2024-05-24 RX ADMIN — DONEPEZIL HYDROCHLORIDE 10 MILLIGRAM(S): 10 TABLET, FILM COATED ORAL at 21:50

## 2024-05-24 RX ADMIN — ENOXAPARIN SODIUM 40 MILLIGRAM(S): 100 INJECTION SUBCUTANEOUS at 05:14

## 2024-05-24 RX ADMIN — CLOPIDOGREL BISULFATE 75 MILLIGRAM(S): 75 TABLET, FILM COATED ORAL at 13:10

## 2024-05-24 RX ADMIN — ATORVASTATIN CALCIUM 80 MILLIGRAM(S): 80 TABLET, FILM COATED ORAL at 21:50

## 2024-05-24 RX ADMIN — LISINOPRIL 5 MILLIGRAM(S): 2.5 TABLET ORAL at 05:04

## 2024-05-24 RX ADMIN — MONTELUKAST 10 MILLIGRAM(S): 4 TABLET, CHEWABLE ORAL at 13:10

## 2024-05-24 RX ADMIN — Medication 500 MILLIGRAM(S): at 17:25

## 2024-05-24 RX ADMIN — Medication 100 MILLIGRAM(S): at 13:10

## 2024-05-24 RX ADMIN — Medication 1 MILLIGRAM(S): at 13:10

## 2024-05-24 RX ADMIN — ESCITALOPRAM OXALATE 30 MILLIGRAM(S): 10 TABLET, FILM COATED ORAL at 13:10

## 2024-05-24 RX ADMIN — Medication 81 MILLIGRAM(S): at 13:10

## 2024-05-24 NOTE — H&P ADULT - NSHPREVIEWOFSYSTEMS_GEN_ALL_CORE
Constiutional:    [   ] WNL           [   ] poor appetite   [   ] insomnia   [   ] tired   Cardio:                [   ] WNL           [   ] CP   [   ] MISTRY   [   ] palpitations               Resp:                   [   ] WNL           [   ] SOB   [   ] cough   [   ] wheezing   GI:                        [   ] WNL           [   ] constipation   [   ] diarrhea   [   ] abdominal pain   [   ] nausea   [   ] emesis                                :                      [   ] WNL           [   ] URIAS  [   ] dusuria   [   ] difficulty voiding             Endo:                   [   ] WNL          [   ] po;yuria   [   ] temperature intolerance                 Skin:                     [   ] WNL          [   ] pain   [   ] wound   [   ] rash   MSK:                    [   ] WNL          [   ] muscle pain   [   ] joint pain/ stiffness   [   ] muscle tenderness   [   ] swelling   Neuro:                 [   ] WNL          [   ] HA   [   ] change in vision   [   ] tremor   [   ] weakness   [   ]dysphagia              Cognitive:           [   ] WNL           [   ]confusion      Psych:                  [   ] WNL           [   ] hallucinations   [   ]agitation   [   ] delusion   [   ]depression Constiutional:    [ x  ] WNL           [   ] poor appetite   [   ] insomnia   [   ] tired   Cardio:                [  x ] WNL           [   ] CP   [   ] MISTRY   [   ] palpitations               Resp:                   [ x  ] WNL           [   ] SOB   [   ] cough   [   ] wheezing   GI:                        [   ] WNL           [   ] constipation   [   x] diarrhea   [   ] abdominal pain   [   ] nausea   [   ] emesis                                :                      [  x ] WNL           [   ] URIAS  [   ] dusuria   [   ] difficulty voiding             Endo:                   [  x ] WNL          [   ] po;yuria   [   ] temperature intolerance                 Skin:                     [  x ] WNL          [   ] pain   [   ] wound   [   ] rash   MSK:                    [ x  ] WNL          [   ] muscle pain   [   ] joint pain/ stiffness   [   ] muscle tenderness   [   ] swelling   Neuro:                 [   ] WNL          [   ] HA   [   ] change in vision   [   ] tremor   [x   ] weakness   [   ]dysphagia              Cognitive:           [   ] WNL           [  x ]confusion      Psych:                  [ x  ] WNL           [   ] hallucinations   [   ]agitation   [   ] delusion   [   ]depression Constitutional:    [ x  ] WNL           [   ] poor appetite   [   ] insomnia   [   ] tired   Cardio:                [  x ] WNL           [   ] CP   [   ] MISTRY   [   ] palpitations               Resp:                   [ x  ] WNL           [   ] SOB   [   ] cough   [   ] wheezing   GI:                        [   ] WNL           [   ] constipation   [   x] diarrhea - 1 episode of large, loose BM earlier in the day   [   ] abdominal pain   [   ] nausea   [   ] emesis                                :                      [  x ] WNL           [   ] URIAS  [   ] dysuria   [   ] difficulty voiding             Endo:                   [  x ] WNL          [   ] polyuria   [   ] temperature intolerance                 Skin:                     [  x ] WNL          [   ] pain   [   ] wound   [   ] rash   MSK:                    [ x  ] WNL          [   ] muscle pain   [   ] joint pain/ stiffness   [   ] muscle tenderness   [   ] swelling   Neuro:                 [   ] WNL          [   ] HA   [   ] change in vision   [   ] tremor   [x   ] weakness   [   ]dysphagia              Cognitive:           [   ] WNL           [  x ]confusion    (patient is confused and a poor historian, though not aware of her deficits)   Psych:                  [ x  ] WNL           [   ] hallucinations   [   ]agitation   [   ] delusion   [   ]depression

## 2024-05-24 NOTE — DISCHARGE NOTE PROVIDER - NSDCMRMEDTOKEN_GEN_ALL_CORE_FT
Aricept 10 mg oral tablet: 1 tab(s) orally once a day  aspirin 81 mg oral delayed release tablet: 1 tab(s) orally once a day  atorvastatin 80 mg oral tablet: 1 tab(s) orally once a day (at bedtime)  clopidogrel 75 mg oral tablet: 1 tab(s) orally once a day end date 6/10/2024  enoxaparin 40 mg/0.4 mL injectable solution: 40 milligram(s) subcutaneously once a day  escitalopram 20 mg oral tablet: 1.5 tab(s) orally once a day  folic acid 1 mg oral tablet: 1 tab(s) orally once a day  lisinopril 5 mg oral tablet: 1 tab(s) orally once a day  LORazepam 1 mg oral tablet: 1 tab(s) orally every 2 hours As needed CIWA-Ar score increase by 2 points and a total score of 7 or less  melatonin 3 mg oral tablet: 1 tab(s) orally once a day (at bedtime) As needed Insomnia  montelukast 10 mg oral tablet: 1 tab(s) orally once a day  thiamine 100 mg oral tablet: 1 tab(s) orally once a day

## 2024-05-24 NOTE — PROGRESS NOTE ADULT - ATTENDING COMMENTS
patient seen and examined at bedside independently of medical resident and agree with the note unless otherwise stated     -no overnight events notified        Vital Signs Last 24 Hrs  T(C): 36.7 (24 May 2024 12:55), Max: 37.2 (24 May 2024 04:37)  T(F): 98 (24 May 2024 12:55), Max: 98.9 (24 May 2024 04:37)  HR: 75 (24 May 2024 12:55) (64 - 75)  BP: 127/81 (24 May 2024 12:55) (107/57 - 128/72)  BP(mean): 91 (23 May 2024 20:26) (91 - 91)  RR: 18 (24 May 2024 12:55) (17 - 18)  SpO2: 95% (24 May 2024 12:55) (95% - 96%)    Parameters below as of 24 May 2024 12:55  Patient On (Oxygen Delivery Method): room air                       13.9   3.57  )-----------( 232      ( 24 May 2024 07:26 )             41.8     05-24    138  |  107  |  22<H>  ----------------------------<  92  4.1   |  17  |  1.0    Ca    9.1      24 May 2024 07:26  Mg     2.1     05-23    TPro  7.3  /  Alb  4.7  /  TBili  0.3  /  DBili  x   /  AST  109<H>  /  ALT  73<H>  /  AlkPhos  82  05-23      # s/p Fall   # Hypertensive Urgency   # Mild Fever  # Recent hospitalization for CVA   # CVA with Right sided residual deficits  # ETOH dependence   # Age related debility   # Unsafe home discharge   # CABG  # Dementia / Depression   # Asthma - no exacerbation     -trauma work up negative  -on DAPT for recent stroke   -ECHO with bubble study ordered - not performed - will follow   -afebrile 24 hrs   -CIWA protocol - MVI, folate, thiamine - CATCH team c/s  -monitor Bp - meds adjusted   -REHAB/PT as tolerated     DISPO: inpatient rehab once clinically stable   -discussed with CM in rounds     Attending Physician Dr. Meagan Santiago # [id number] .

## 2024-05-24 NOTE — H&P ADULT - NSHPSOCIALHISTORY_GEN_ALL_CORE
LS: Pt lives alone in private house with 1 step to enter. Has an attic with 10 steps to enter.  Housewife LS: Pt lives alone in private house with 1 step to enter. Has an attic with 10 steps to enter.  Housewife. Fully independent w/out device and managing her home PTA

## 2024-05-24 NOTE — CHART NOTE - NSCHARTNOTEFT_GEN_A_CORE
H/P signed from today's date
Electrophysiology Brief Post-Op Note    I have personally seen and examined the patient.  I agree with the history and physical which I have reviewed and noted any changes below.  05-24-24 @ 17:06    PRE-OP DIAGNOSIS: Cryptogenic CVA    POST-OP DIAGNOSIS: Cryptogenic CVA    PROCEDURE: Loop Implant    Implanting Physician: Sergey Gallego MD   Following Physician: Sergey Gallego MD   Assistant: ROSARIO Carlin     ESTIMATED BLOOD LOSS:  1 mL    ANESTHESIA TYPE:  [  ]General Anesthesia  [  ] Sedation  [X  ] Local/Regional    CONDITION  [  ] Critical  [  ] Serious  [  ]Fair  [ X ]Good    SPECIMENS REMOVED (IF APPLICABLE):  none    IMPLANTS (IF APPLICABLE)  Loop Recorder ([employer] [facility] [employer]) Assert IQ EL  Serial Number: [id number]  R-wave: 0.80mV    FINDINGS  PLAN OF CARE  - F/U 3-4 weeks  - May shower in 48 hours  - Do not get site wet for 7 days  - Remove band aid in 1 week

## 2024-05-24 NOTE — H&P ADULT - ATTENDING COMMENTS
I reviewed the chart and examined the patient with the resident and we discussed the findings and treatment plan.  The patient is tolerating the bedside rehab program well. I agree with the findings and treatment plan above, which I modified as indicated. The patient requires 3 hrs a day of acute inpatient rehab. Patient is poor historian, who admits to drinking 4-5 glasses of wine daily, admitted recently with right sided weakness and found to have left subcortical ischemic stroke. She insisted on going home and she was discharged to her son with the plan of getting an echo and ILR as an outpatient. At home, she fell twice and was readmitted the following day with no new neurologic symptoms. She now requires min assist for transfer, min assist to CG for ambulation with a RW and min to mod assistance for dressing. PTA, she was living alone and ambulation indep. w/o device and managing her own home. The patient requires acute interdisciplinary rehab including PT and OT and ST and Neuropsych evals to maximize function for safe d/c home in a reasonable time. The patient can tolerate and benefit from 3 hrs daily therapy and requires Physiatry f/u at least 3 days a week to manage her neurologic symptoms, monitor for ETOH withdrawal, and monitor and manage her comorbidities including CAD s/p CABG and new loose BMs and new Dx uncontrolled HTN on admission.     I read, edited and agree with the Assessment:  #Rehab of decline in function iso recent ischemic stroke with residual R hemiparesis dominant, readmitted with  multiple falls at home   -MRI Brain: Focal acute infarct of the left corona/putamen.  - Trauma workup on admission unremarkable, no fractures  - EEG : Global focal slowing; no epileptic activity seen; non specific   -Echo pending - can be done on rehab per EPS and Neuro  -plan for ILR on rehab  - c/w ASA and plavix for 3 weeks (started 5/19; Plavix end date 6/10) and then ASA 81mg only  -c/w lipitor 80mg   -PT/OT/SLP eval and treat   -neuropsych consult    # CAD s/p CABG  # Hypertension:   - pt started on Lisinopril 5 mg   - Monitor BP adjust medications as necessary     #HTN - uncontrolled on admission  - continue Lisinopril and monitor and adjust    #Alcohol Use Disorder  - Patient reports that patient drinks approx 4-5 glasses of wine per day, has been drinking this way "for years"  - folic acid and thiamine supplementation    #Large loose BM x 1  - possible related to ETOH w/drawl  - monitor    #Long-term memory impairment with confabulation and poor insight c/w Wernicke's Encephalopathy  - c/w Donepezil 10 mg qhs  - thiamine  - B12 and TSH WNL    #Depression  - c/w Lexapro 30 mg qd    #Asthma  - c/w Montelukast 10 mg qd      -Pain control: Tylenol prn     -Skin:  No active issues at this time    - Diet: DASH/TLC           Precautions / PROPHYLAXIS:      - Falls      - DVT prophylaxis: lovenox

## 2024-05-24 NOTE — H&P ADULT - HISTORY OF PRESENT ILLNESS
88y Female with PMHx of of CAD status post CABG presented to ED with complaints of sudden onset right sided weakness beginning last night at 10pm. Pt states that the right side of her body felt "off", she has never experienced this. This morning, pt woke up with generalized weakness that was nonlocalized which caused her to feel weak while standing, in which she had to sit. Pt denies right sided weakness at this time, but states she feels weak all over. Pt endorses fall last week in which she hit her head slightly. Stroke code called in ED. /91 in triage. 88y Female with PMHx of CAD status post CABG, alcohol use disorder, asthma presented to ED initally on 5/19  with complaints of sudden onset right sided weakness beginning last night at 10pm. pt woke up with generalized weakness that was nonlocalized which caused her to feel weak while standing. Pt endorses fall last week in which she hit her head slightly. Stroke code called in ED. /91 in triage. CTH negative, CTP negative, CTA head and neck showing mild stenosis. MRI showed acute infarct of the left corona/putamen.   Pt was started on DAPT and statin, and pt was discharged 5/21 per pt/family wishes with outpatient follow up for TTE and ILR. On 5/22 pt presented to ED with generalized weakness s/p two falls at home. Son stated that [facility] was at the patient's home evaluating her for home PT/OT when they noticed that patient was unsteady on her feet, and while speaking to son on the phone, patient fell from standing position onto her knees onto a carpeted floor, no head injury or LOC. Decision was made to call EMS; when EMS arrived and was beginning transport into [facility], patient once again fell from standing position onto her knees, no head injury or LOC. BP in ED was  193/76 and given IV labetalol. Trauma workup unremarkable.  EPS saw patient, plan  for ILR and TTE 5/24. Once medically stabilized patient considered a candidate for acute rehab.     PMR team evaluated the patient and deemed her appropriate for acute rehab 2/2 decline in function with multiple comorbidities and ability to tolerate and benefit for 3 hours of therapy daily at least 5 days a week.   PLOF: patient independent with ADLs and no AD for ambulation.   Admission LOF: Matt for bed mobility and transfers, ambulates 50ft with RW CGA, Matt fo Rian, modA for LBD, dependent for toileting  LS: Pt lives alone in private house with 1 step to enter. Has an attic with 10 steps to enter.         88y Female with PMHx of CAD status post CABG, alcohol use disorder, asthma presented to ED initially on 5/19  with complaints of sudden onset right sided weakness beginning last night at 10pm. pt woke up with generalized weakness that was nonlocalized which caused her to feel weak while standing. Pt endorses fall last week in which she hit her head slightly. Stroke code called in ED. /91 in triage. CTH negative, CTP negative, CTA head and neck showing mild stenosis. MRI showed acute infarct of the left corona radiata/putamen.  Pt was started on DAPT and statin, and pt was discharged 5/21 per pt/family wishes with outpatient follow up for TTE and ILR.     On 5/22 pt presented to ED with generalized weakness s/p two falls at home. Son stated that [facility] was at the patient's home evaluating her for home PT/OT when they noticed that patient was unsteady on her feet, and while speaking to son on the phone, Patient fell from standing position onto her knees onto a carpeted floor, no head injury or LOC. Decision was made to call EMS; when EMS arrived and was beginning transport into [facility], patient once again fell from standing position onto her knees, no head injury or LOC. BP in ED was  193/76 and given IV labetalol. Trauma workup unremarkable.  EPS saw patient, plan  for ILR and TTE, which they said can be done on the rehab unit. Once medically stabilized patient considered a good candidate for acute rehab.     PMR team evaluated the patient and deemed her appropriate for acute rehab 2/2 decline in function with multiple comorbidities and ability to tolerate and benefit for 3 hours of therapy daily at least 5 days a week.   PLOF: patient independent with ADLs and no AD for ambulation.   Admission LOF: Matt for bed mobility and transfers, ambulates 50ft with RW CGA, Matt fo rUBD, modA for LBD, dependent for toileting  LS: Pt lives alone in private house with 1 step to enter. Has an attic with 10 steps to enter. Of note, the patient admits to drinking 4-5 glasses of wine daily, and reportedly was drinking at home after her discharge from the hospital. At this point, she does not remember being home after her stroke admission.

## 2024-05-24 NOTE — H&P ADULT - NSICDXPASTSURGICALHX_GEN_ALL_CORE_FT
PAST SURGICAL HISTORY:  Known health problems: none      PAST SURGICAL HISTORY:  S/P CABG (coronary artery bypass graft)

## 2024-05-24 NOTE — DISCHARGE NOTE PROVIDER - NSDCCPCAREPLAN_GEN_ALL_CORE_FT
PRINCIPAL DISCHARGE DIAGNOSIS  Diagnosis: Generalized weakness  Assessment and Plan of Treatment: You presented with generalised weakness and 2 episode of fall at home. Trauma workup was negative. MRI showed a ischemic stroke in left basal ganglia. You were started on aspirin and plavix for 3 weeks, thereafter you will continue with aspirin only. Electrophysiology were consulted and a Loop recorder will be implanted to monitor your heart rhythm. You qualified for inpatient rehab and will be discharged for rehabilitation. Please continue taking all your medications and follow up with your primary care provider along with your electrophysiologist.      SECONDARY DISCHARGE DIAGNOSES  Diagnosis: Fall in home  Assessment and Plan of Treatment:      PRINCIPAL DISCHARGE DIAGNOSIS  Diagnosis: Generalized weakness  Assessment and Plan of Treatment: You presented with generalised weakness and 2 episode of fall at home. Trauma workup was negative. MRI showed a ischemic stroke in left basal ganglia. You were started on aspirin and plavix for 3 weeks, thereafter you will continue with aspirin only. Electrophysiology were consulted and a Loop recorder will be implanted to monitor your heart rhythm. You qualified for inpatient rehab and will be discharged for rehabilitation. Please continue taking all your medications and follow up with your primary care provider along with your electrophysiologist.

## 2024-05-24 NOTE — DISCHARGE NOTE PROVIDER - CARE PROVIDER_API CALL
Ivett Gaines  Internal Medicine  [address] [place] [id number]  Phone: [phone]  Fax: [phone]  Follow Up Time: 1 week

## 2024-05-24 NOTE — PATIENT PROFILE ADULT - FALL HARM RISK - HARM RISK INTERVENTIONS
Assistance with ambulation/Assistance OOB with selected safe patient handling equipment/Communicate Risk of Fall with Harm to all staff/Monitor for mental status changes/Monitor gait and stability/Reinforce activity limits and safety measures with patient and family/Tailored Fall Risk Interventions/Toileting schedule using arm’s reach rule for commode and bathroom/Use of alarms - bed, chair and/or voice tab/Visual Cue: Yellow wristband and red socks/Bed in lowest position, wheels locked, appropriate side rails in place/Call bell, personal items and telephone in reach/Instruct patient to call for assistance before getting out of bed or chair/Non-slip footwear when patient is out of bed/Mica to call system/Physically safe environment - no spills, clutter or unnecessary equipment/Purposeful Proactive Rounding/Room/bathroom lighting operational, light cord in reach

## 2024-05-24 NOTE — DISCHARGE NOTE PROVIDER - NSCORESITESY/N_GEN_A_CORE_RD
[FreeTextEntry1] : Dear Dr. Kalyani Orona,\par \par I had the pleasure of seeing your patient ILYA BLACKMAN in the office today.  My office note is attached.\par \par Thank you very much for allowing me to participate in the care of your patient.\par \par Sincerely,\par \par Se Dumont M.D., FACG, FACP\par Director, Celiac Program at [facility]\par  of Medicine\par [employer] [facility] [place] Practice Director,\par [facility] - Gastroenterology/Internal Medicine at [place]\par [address] - [address]\[place] [address]\par Tel: [phone]\par Email: [email or web address] 
No

## 2024-05-24 NOTE — H&P ADULT - NSHPLABSRESULTS_GEN_ALL_CORE
13.9   3.57  )-----------( 232      ( 24 May 2024 07:26 )             41.8     05-24    138  |  107  |  22<H>  ----------------------------<  92  4.1   |  17  |  1.0    Ca    9.1      24 May 2024 07:26  Mg     2.1     05-23    TPro  7.3  /  Alb  4.7  /  TBili  0.3  /  DBili  x   /  AST  109<H>  /  ALT  73<H>  /  AlkPhos  82  05-23    PT/INR - ( 22 May 2024 14:13 )   PT: 12.70 sec;   INR: 1.11 ratio         PTT - ( 22 May 2024 14:13 )  PTT:26.5 sec  Urinalysis Basic - ( 24 May 2024 07:26 )    Color: x / Appearance: x / SG: x / pH: x  Gluc: 92 mg/dL / Ketone: x  / Bili: x / Urobili: x   Blood: x / Protein: x / Nitrite: x   Leuk Esterase: x / RBC: x / WBC x   Sq Epi: x / Non Sq Epi: x / Bacteria: x                  CT Head No Cont:   ACC: [id number] EXAM:  CT BRAIN   ORDERED BY: DL BLACK     PROCEDURE DATE:  05/22/2024          INTERPRETATION:  Clinical History / Reason for exam: Status post fall    CAT scan head was performed without the administration intravenous   contrast.    Comparison is made to prior CAT scan head from May 19, 2024.    FINDINGS:      Again demonstrated is a hypodensity in the anterior limb of the right   internal capsule, suggestive of a chronic infarct. Again demonstrated is   a infarct within the left corona radiata/putamen.    There is no CT evidence of acute transcortical infarct. Age-related   involutional changes and chronic microvascular ischemic changes.    There is no hydrocephalus, mass effect, or acute intracranial hemorrhage.   No extra-axial collection. Basal cisterns are patent.    The visualized paranasal sinuses and mastoid air cells are clear.    The calvarium is intact. Again demonstrated is a calcified left frontal   meningioma.    IMPRESSION:    Stable findings. No acute intracranial pathology.      --- End of Report ---            ELIZABETH BARRERA MD; Attending Radiologist  This document has been electronically signed. May 22 2024  5:11PM (05-22-24 @ 16:56)    < from: MR Head No Cont (05.19.24 @ 20:36) >    IMPRESSION:    Focal acute infarct of the left corona/putamen.    < end of copied text > 13.9   3.57  )-----------( 232      ( 24 May 2024 07:26 )             41.8     05-24    138  |  107  |  22<H>  ----------------------------<  92  4.1   |  17  |  1.0    Ca    9.1      24 May 2024 07:26  Mg     2.1     05-23    TPro  7.3  /  Alb  4.7  /  TBili  0.3  /  DBili  x   /  AST  109<H>  /  ALT  73<H>  /  AlkPhos  82  05-23              CT Head No Cont:   ACC: [id number] EXAM:  CT BRAIN   ORDERED BY: DL BLACK     PROCEDURE DATE:  05/22/2024          INTERPRETATION:  Clinical History / Reason for exam: Status post fall    CAT scan head was performed without the administration intravenous   contrast.    Comparison is made to prior CAT scan head from May 19, 2024.    FINDINGS:      Again demonstrated is a hypodensity in the anterior limb of the right   internal capsule, suggestive of a chronic infarct. Again demonstrated is   a infarct within the left corona radiata/putamen.    There is no CT evidence of acute transcortical infarct. Age-related   involutional changes and chronic microvascular ischemic changes.    There is no hydrocephalus, mass effect, or acute intracranial hemorrhage.   No extra-axial collection. Basal cisterns are patent.    The visualized paranasal sinuses and mastoid air cells are clear.    The calvarium is intact. Again demonstrated is a calcified left frontal   meningioma.    IMPRESSION:    Stable findings. No acute intracranial pathology.      --- End of Report ---    < from: MR Head No Cont (05.19.24 @ 20:36) >    IMPRESSION:    Focal acute infarct of the left corona/putamen.    < end of copied text >            ELIZABETH BARRERA MD; Attending Radiologist  This document has been electronically signed. May 22 2024  5:11PM (05-22-24 @ 16:56)    < from: MR Head No Cont (05.19.24 @ 20:36) >    IMPRESSION:    Focal acute infarct of the left corona/putamen.    < end of copied text >

## 2024-05-24 NOTE — H&P ADULT - ASSESSMENT
ASSESSMENT/PLAN  87 y/o F with PMHx of CABG, dementia, asthma, alcohol use disorder, and recent CVA with right sided weakness (on 5/19/2024) that presented to the ED on 5/22 with generalized weakness s/p two falls at home.    #Rehab of decline in function iso recent CVA c/b multiple falls at home   -MRI Brain: Focal acute infarct of the left corona/putamen.     -Pain control:     -GI/Bowel Mgmt:    -Bladder management:      -Skin:  No active issues at this time    -FEN     - Diet:           Precautions / PROPHYLAXIS:      - Falls    - Ortho: Weight bearing status:       - DVT prophylaxis:      MEDICAL PROGNOSIS: GOOD            REHAB POTENTIAL: GOOD             ESTIMATED DISPOSITION: HOME WITH HOME CARE       [ x ]  The goals of the IRF admission were discussed with the patient and or family member, who agreed             ELOS:  [     ] 7-14    [    ]  14-21    [    ]  Other    THERAPY ORDERS and INITIAL INDIVIDUALIZED PLAN OF CARE:  This initial individualized interdisciplinary plan of care, which was established by me (the attending physiatrist), is based on elements from the post admission evaluation. The interdisciplinary therapy program is to be at least 3 hrs a day, at least 5 days per week from from physical, occupational and/ or speech therapies as ordered by me below.      [ x  ] P.T. 90 mins. /day at least 5 out of 7 days:  [  x ] superficial  modalities prn, [ x  ] A/AAROM, [ x  ] PREs, [ x  ] transfer training,            [ x  ] progressive ambulation, [x   ] stairs                                               [ x  ] O.T. 90 mins. /day at least 5 out of 7 days::  [ x  ] modalities prn,  [ x  ]A/AAROM, [ x  ] PREs, [  x ] functional transfer training, [ x  ] ADL training           [   ] cognitive/ perceptual eval and training, [   ] splint eval                                                  [   ] S.L.P:  [   ] speech eval, [   ] swallow eval     [   ] Neuropsychology eval     [ x  ] Individualized rec. therapy      RATIONALE FOR INPATIENT ADMISSION - Patient demonstrates the following: (check all that apply)  [X] Medically appropriate for acute rehabilitation admission. Requires interdisiplinary therapy consisting of at least PT and OT, at least 3 hrs. a day at least 5 days a week  [X] Has attainable rehab goals with an appropriate initial discharge plan  [X] Has rehabilitation potential (expected to make a significant improvement within a reasonable period of time)  [X] Requires close medical management by a rehab physician, rehab nursing care,  and comprehensive interdisciplinary team (including PT, OT)    [X] Requires evaluation by a physiatrist at least 3 days a week to evaluate and manage and coordinate rehab and medical problems   ASSESSMENT/PLAN  89 y/o F with PMHx of CABG, dementia, asthma, alcohol use disorder, and recent CVA with right sided weakness (on 5/19/2024) that presented to the ED on 5/22 with generalized weakness s/p two falls at home.    #Rehab of decline in function iso recent ischemic stroke with residual R hemiparesis c/b multiple falls at home   -MRI Brain: Focal acute infarct of the left corona/putamen.  - Trauma workup on admission unremarkable, no fractures  - EEG : Global focal slowing; no epileptic activity seen; non specific   -Echo pending   -plan for ILR 5/24  - c/w ASA and plavix for 3 weeks (started 5/19; Plavix end date 6/10) and then ASA 81mg only  -c/w lipitor 80mg   -PT/OT/SLP eval and treat   -neuropsych consult    # CAD s/p CABG  # Hypertension:   - pt started Lisinopril 5 mg   - Monitor BP adjust medications as necessary     #Alcohol Use Disorder  - Patient's son reports that patient drinks approx 4-5 glasses of wine per day, has been drinking this way "for years"  - Patient is likely out of the window for delirium tremens as patient's son states that patient has not had alcohol since 5/19, but there is a chance that patient drank wine after discharge from hospital on 5/21  - [facility] protocol  - folic acid and thiamine supplementation    #Dementia  - c/w Donepezil 10 mg qhs    #Depression  - c/w Lexapro 30 mg qd    #Asthma  - c/w Montelukast 10 mg qd      -Pain control:     -GI/Bowel Mgmt: PPI, senna     -Skin:  No active issues at this time    - Diet: DASH/TLC           Precautions / PROPHYLAXIS:      - Falls      - DVT prophylaxis: lovenox and SCDs      MEDICAL PROGNOSIS: GOOD            REHAB POTENTIAL: GOOD             ESTIMATED DISPOSITION: HOME WITH HOME CARE       [ x ]  The goals of the IRF admission were discussed with the patient and or family member, who agreed             ELOS:  [   x  ] 7-14    [    ]  14-21    [    ]  Other    THERAPY ORDERS and INITIAL INDIVIDUALIZED PLAN OF CARE:  This initial individualized interdisciplinary plan of care, which was established by me (the attending physiatrist), is based on elements from the post admission evaluation. The interdisciplinary therapy program is to be at least 3 hrs a day, at least 5 days per week from from physical, occupational and/ or speech therapies as ordered by me below.      [ x  ] P.T. 90 mins. /day at least 5 out of 7 days:  [  x ] superficial  modalities prn, [ x  ] A/AAROM, [ x  ] PREs, [ x  ] transfer training,            [ x  ] progressive ambulation, [x   ] stairs                                               [ x  ] O.T. 90 mins. /day at least 5 out of 7 days::  [ x  ] modalities prn,  [ x  ]A/AAROM, [ x  ] PREs, [  x ] functional transfer training, [ x  ] ADL training           [   ] cognitive/ perceptual eval and training, [   ] splint eval                                                  [x   ] S.L.P:  [ x  ] speech eval, [  x ] swallow eval     [x   ] Neuropsychology eval     [ x  ] Individualized rec. therapy      RATIONALE FOR INPATIENT ADMISSION - Patient demonstrates the following: (check all that apply)  [X] Medically appropriate for acute rehabilitation admission. Requires interdisiplinary therapy consisting of at least PT and OT, at least 3 hrs. a day at least 5 days a week  [X] Has attainable rehab goals with an appropriate initial discharge plan  [X] Has rehabilitation potential (expected to make a significant improvement within a reasonable period of time)  [X] Requires close medical management by a rehab physician, rehab nursing care,  and comprehensive interdisciplinary team (including PT, OT)    [X] Requires evaluation by a physiatrist at least 3 days a week to evaluate and manage and coordinate rehab and medical problems   ASSESSMENT/PLAN  87 y/o F with PMHx of CABG, dementia, asthma, alcohol use disorder, and recent CVA with right sided weakness (on 5/19/2024) that presented to the ED on 5/22 with generalized weakness s/p two falls at home.    #Rehab of decline in function iso recent ischemic stroke with residual R hemiparesis c/b multiple falls at home   -MRI Brain: Focal acute infarct of the left corona/putamen.  - Trauma workup on admission unremarkable, no fractures  - EEG : Global focal slowing; no epileptic activity seen; non specific   -Echo pending   -plan for ILR 5/24  - c/w ASA and plavix for 3 weeks (started 5/19; Plavix end date 6/10) and then ASA 81mg only  -c/w lipitor 80mg   -PT/OT/SLP eval and treat   -neuropsych consult    # CAD s/p CABG  # Hypertension:   - pt started Lisinopril 5 mg   - Monitor BP adjust medications as necessary     #Alcohol Use Disorder  - Patient reports that patient drinks approx 4-5 glasses of wine per day, has been drinking this way "for years"  - folic acid and thiamine supplementation    #Dementia  - c/w Donepezil 10 mg qhs    #Depression  - c/w Lexapro 30 mg qd    #Asthma  - c/w Montelukast 10 mg qd      -Pain control: tylenol prn    -GI/Bowel Mgmt: PPI, senna     -Skin:  No active issues at this time    - Diet: DASH/TLC           Precautions / PROPHYLAXIS:      - Falls      - DVT prophylaxis: lovenox and SCDs      MEDICAL PROGNOSIS: GOOD            REHAB POTENTIAL: GOOD             ESTIMATED DISPOSITION: HOME WITH HOME CARE       [ x ]  The goals of the IRF admission were discussed with the patient and or family member, who agreed             ELOS:  [   x  ] 7-14    [    ]  14-21    [    ]  Other    THERAPY ORDERS and INITIAL INDIVIDUALIZED PLAN OF CARE:  This initial individualized interdisciplinary plan of care, which was established by me (the attending physiatrist), is based on elements from the post admission evaluation. The interdisciplinary therapy program is to be at least 3 hrs a day, at least 5 days per week from from physical, occupational and/ or speech therapies as ordered by me below.      [ x  ] P.T. 90 mins. /day at least 5 out of 7 days:  [  x ] superficial  modalities prn, [ x  ] A/AAROM, [ x  ] PREs, [ x  ] transfer training,            [ x  ] progressive ambulation, [x   ] stairs                                               [ x  ] O.T. 90 mins. /day at least 5 out of 7 days::  [ x  ] modalities prn,  [ x  ]A/AAROM, [ x  ] PREs, [  x ] functional transfer training, [ x  ] ADL training           [   ] cognitive/ perceptual eval and training, [   ] splint eval                                                  [x   ] S.L.P:  [ x  ] speech eval, [  x ] swallow eval     [x   ] Neuropsychology eval     [ x  ] Individualized rec. therapy      RATIONALE FOR INPATIENT ADMISSION - Patient demonstrates the following: (check all that apply)  [X] Medically appropriate for acute rehabilitation admission. Requires interdisiplinary therapy consisting of at least PT and OT, at least 3 hrs. a day at least 5 days a week  [X] Has attainable rehab goals with an appropriate initial discharge plan  [X] Has rehabilitation potential (expected to make a significant improvement within a reasonable period of time)  [X] Requires close medical management by a rehab physician, rehab nursing care,  and comprehensive interdisciplinary team (including PT, OT)    [X] Requires evaluation by a physiatrist at least 3 days a week to evaluate and manage and coordinate rehab and medical problems   ASSESSMENT/PLAN  87 y/o F with PMHx of CABG, dementia, asthma, alcohol use disorder, and recent CVA with right sided weakness (on 5/19/2024) that presented to the ED on 5/22 with generalized weakness s/p two falls at home.    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  - Trauma workup on admission unremarkable, no fractures  - EEG : Global focal slowing; no epileptic activity seen; non specific   -Echo pending - can be done on rehab per EPS and Neuro  -plan for ILR on rehab  - c/w ASA and plavix for 3 weeks (started 5/19; Plavix end date 6/10) and then ASA 81mg only  -c/w lipitor 80mg   -PT/OT/SLP eval and treat   -neuropsych consult    # CAD s/p CABG  # Hypertension:   - pt started on Lisinopril 5 mg   - Monitor BP adjust medications as necessary     #HTN - uncontrolled on admission  - continue Lisinopril and monitor and adjust    #Alcohol Use Disorder  - Patient reports that patient drinks approx 4-5 glasses of wine per day, has been drinking this way "for years"  - folic acid and thiamine supplementation    #Large loose BM x 1  - possible related to ETOH w/drawl  - monitor    #Long-term memory impairment with confabulation and poor insight c/w Wernicke's Encephalopathy  - c/w Donepezil 10 mg qhs  - thiamine  - B12 and TSH WNL    #Depression  - c/w Lexapro 30 mg qd    #Asthma  - c/w Montelukast 10 mg qd      -Pain control: Tylenol prn     -Skin:  No active issues at this time    - Diet: DASH/TLC           Precautions / PROPHYLAXIS:      - Falls      - DVT prophylaxis: lovenox       MEDICAL PROGNOSIS: GOOD            REHAB POTENTIAL: GOOD for ambulation with device and supervision     DISPOSITION: HOME WITH HOME CARE       [ x ]  The goals of the IRF admission were discussed with the patient,who agreed             ELOS:  [   x  ] 7-14    [    ]  14-21    [    ]  Other    THERAPY ORDERS and INITIAL INDIVIDUALIZED PLAN OF CARE:  This initial individualized interdisciplinary plan of care, which was established by me (the attending physiatrist), is based on elements from the post admission evaluation. The interdisciplinary therapy program is to be at least 3 hrs a day, at least 5 days per week from from physical, occupational and/ or speech therapies as ordered by me below.      [ x  ] P.T. 90 mins. /day at least 5 out of 7 days:  [  x ] superficial  modalities prn, [ x  ] A/AAROM, [ x  ] PREs, [ x  ] transfer training,            [ x  ] progressive ambulation, [x   ] stairs                                               [ x  ] O.T. 90 mins. /day at least 5 out of 7 days::  [ x  ] modalities prn,  [ x  ]A/AAROM, [ x  ] PREs, [  x ] functional transfer training, [ x  ] ADL training           [ x  ] cognitive/ perceptual eval and training, [   ] splint eval                                                  [x   ] S.L.P:  [ x  ] speech eval, [  x ] swallow eval     [x   ] Neuropsychology eval     [ x  ] Individualized rec. therapy      RATIONALE FOR INPATIENT ADMISSION - Patient demonstrates the following: (check all that apply)  [X] Medically appropriate for acute rehabilitation admission. Requires interdisiplinary therapy consisting of at least PT and OT, at least 3 hrs. a day at least 5 days a week  [X] Has attainable rehab goals with an appropriate initial discharge plan  [X] Has rehabilitation potential (expected to make a significant improvement within a reasonable period of time)  [X] Requires close medical management by a rehab physician, rehab nursing care,  and comprehensive interdisciplinary team (including PT, OT)    [X] Requires evaluation by a physiatrist at least 3 days a week to evaluate and manage and coordinate rehab and medical problems

## 2024-05-24 NOTE — PROGRESS NOTE ADULT - SUBJECTIVE AND OBJECTIVE BOX
24H events:    Patient is a 88y old Female who presents with a chief complaint of   Primary diagnosis of Generalized weakness    Today is hospital day 1d. This morning patient was seen and examined at bedside, resting comfortably in bed.    No acute or major events overnight.    Code Status: FULL       PAST MEDICAL & SURGICAL HISTORY  CAD (coronary artery disease)    Known health problems: none      SOCIAL HISTORY:  Social History:      ALLERGIES:  No Known Allergies    MEDICATIONS:  STANDING MEDICATIONS  aspirin enteric coated 81 milliGRAM(s) Oral daily  atorvastatin 80 milliGRAM(s) Oral at bedtime  clopidogrel Tablet 75 milliGRAM(s) Oral daily  donepezil 10 milliGRAM(s) Oral at bedtime  enoxaparin Injectable 40 milliGRAM(s) SubCutaneous every 24 hours  escitalopram 30 milliGRAM(s) Oral daily  folic acid 1 milliGRAM(s) Oral daily  lisinopril 5 milliGRAM(s) Oral daily  montelukast 10 milliGRAM(s) Oral daily  thiamine 100 milliGRAM(s) Oral daily    PRN MEDICATIONS  acetaminophen     Tablet .. 650 milliGRAM(s) Oral every 6 hours PRN  LORazepam     Tablet 1 milliGRAM(s) Oral every 2 hours PRN  melatonin 3 milliGRAM(s) Oral at bedtime PRN    VITALS:   T(F): 99.1  HR: 60  BP: 178/79  RR: 19  SpO2: 95%    PHYSICAL EXAM:  GENERAL:   ( x ) NAD, lying in bed comfortably     (  ) obtunded     (  ) lethargic     (  ) somnolent    HEAD:   ( x ) Atraumatic     (  ) hematoma     (  ) laceration (specify location:       )     NECK:  (xx  ) Supple     (  ) neck stiffness     (  ) nuchal rigidity     (  )  no JVD     (  ) JVD present ( -- cm)    HEART:  Rate -->     ( x ) normal rate     (  ) bradycardic     (  ) tachycardic  Rhythm -->     (  ) regular     (  ) regularly irregular     ( x ) irregularly irregular  Murmurs -->     (x  ) normal s1s2     (  ) systolic murmur     (  ) diastolic murmur     (  ) continuous murmur      (  ) S3 present     (  ) S4 present    LUNGS:   (x  )Unlabored respirations     (  ) tachypnea  ( x ) B/L air entry     (  ) decreased breath sounds in:  (location     )    (x  ) no adventitious sound     (  ) crackles     (  ) wheezing      (  ) rhonchi      (specify location:       )  (  ) chest wall tenderness (specify location:       )    ABDOMEN:   (x  ) Soft     (  ) tense   |   (x  ) nondistended     (  ) distended   |   ( x ) +BS     (  ) hypoactive bowel sounds     (  ) hyperactive bowel sounds  (  ) nontender     (  ) RUQ tenderness     (  ) RLQ tenderness     (  ) LLQ tenderness     (  ) epigastric tenderness     (  ) diffuse tenderness  (  ) Splenomegaly      (  ) Hepatomegaly      (  ) Jaundice     (  ) ecchymosis     EXTREMITIES:  ( x ) Normal     (  ) Rash     (  ) ecchymosis     (  ) varicose veins      (  ) pitting edema     (  ) non-pitting edema   (  ) ulceration     (  ) gangrene:     (location:     )    NERVOUS SYSTEM:     A&Ox2   ; R UE 4/5 ; L UE 4/5   Normal CN function   Normal sensation bilateral   No dysdiadochokinesia       LABS:                        13.2   5.06  )-----------( 228      ( 22 May 2024 14:13 )             38.5     05-22    138  |  103  |  10  ----------------------------<  97  4.1   |  23  |  0.9    Ca    9.0      22 May 2024 14:13  Mg     2.0     05-22    TPro  6.5  /  Alb  4.3  /  TBili  0.4  /  DBili  x   /  AST  79<H>  /  ALT  49<H>  /  AlkPhos  74  05-22    PT/INR - ( 22 May 2024 14:13 )   PT: 12.70 sec;   INR: 1.11 ratio         PTT - ( 22 May 2024 14:13 )  PTT:26.5 sec  Urinalysis Basic - ( 22 May 2024 14:13 )    Color: x / Appearance: x / SG: x / pH: x  Gluc: 97 mg/dL / Ketone: x  / Bili: x / Urobili: x   Blood: x / Protein: x / Nitrite: x   Leuk Esterase: x / RBC: x / WBC x   Sq Epi: x / Non Sq Epi: x / Bacteria: x                RADIOLOGY :   < from: CT Head No Cont (05.22.24 @ 16:56) >  FINDINGS:      Again demonstrated is a hypodensity in the anterior limb of the right   internal capsule, suggestive of a chronic infarct. Again demonstrated is   a infarct within the left corona radiata/putamen.    There is no CT evidence of acute transcortical infarct. Age-related   involutional changes and chronic microvascular ischemic changes.    There is no hydrocephalus, mass effect, or acute intracranial hemorrhage.   No extra-axial collection. Basal cisterns are patent.    The visualized paranasal sinuses and mastoid air cells are clear.    The calvarium is intact. Again demonstrated is a calcified left frontal   meningioma.    IMPRESSION:    Stable findings. No acute intracranial pathology.    < end of copied text >  < from: CT Cervical Spine No Cont (05.22.24 @ 16:56) >  FINDINGS:    ALIGNMENT: Craniocervical junction is intact. Spinal alignment is stable.  compared to prior. Anterolisthesis C4-5, retrolisthesis C5-6.    BONES:  No acute fracture or subluxation. Stable bone fragment superior   to C2, which may represent a chronic fracture.    DISCS AND FACET JOINTS: Moderate to severe multilevel degenerative   changes. Vertebral body heights are preserved. Intervertebral disc   disease most prominentat C5-6. Stable punctate hyperdensity in the C3   vertebral body, likely a bone island. Foraminal narrowing is again noted   bilaterally at C5-C6. Probable bone island within the C3 vertebral body.    EXTRASPINAL: No prevertebral soft tissue swelling. Paraspinal and   included extraspinal soft tissues are unremarkable.    OTHER: Sternotomy wires.      IMPRESSION:    No acute cervical fracture or dislocation.    Multilevel degenerative changes.    < end of copied text >  < from: Xray Pelvis AP only (05.22.24 @ 14:09) >  Findings:    No acute fracture or acute articular abnormality. There are degenerative   changes of the lower lumbar spine. No radiopaque foreign body is   identified.    Impression:    No acute fracture or acute articular abnormality.    --- End of Report ---    < end of copied text >  < from: MR Cervical Spine No Cont (05.19.24 @ 20:36) >    IMPRESSION:    1.  Mild degenerative changes    2.  No spinal stenosis. Mild foraminal narrowing bilaterally C5-6    3.  Cord signal normal.    4.  Incidental note: Multilevel foraminal cysts as described, largest at   T1-T2 and T2-T3    < end of copied text >          
24H events:    Patient is a 88y old Female who presents with a chief complaint of generalized weakness (23 May 2024 12:30)    Primary diagnosis of Generalized weakness        PAST MEDICAL & SURGICAL HISTORY  CAD (coronary artery disease)    Known health problems: none      SOCIAL HISTORY:  Social History:      ALLERGIES:  No Known Allergies      VITALS:   T(F): 98.9  HR: 64  BP: 107/57  RR: 18  SpO2: 96%    LABS:                        13.7   4.30  )-----------( 241      ( 23 May 2024 09:59 )             41.8     05-23    136  |  102  |  16  ----------------------------<  135<H>  3.8   |  17  |  0.9    Ca    9.3      23 May 2024 09:59  Mg     2.1     05-23    TPro  7.3  /  Alb  4.7  /  TBili  0.3  /  DBili  x   /  AST  109<H>  /  ALT  73<H>  /  AlkPhos  82  05-23    PT/INR - ( 22 May 2024 14:13 )   PT: 12.70 sec;   INR: 1.11 ratio         PTT - ( 22 May 2024 14:13 )  PTT:26.5 sec  Urinalysis Basic - ( 23 May 2024 09:59 )    Color: x / Appearance: x / SG: x / pH: x  Gluc: 135 mg/dL / Ketone: x  / Bili: x / Urobili: x   Blood: x / Protein: x / Nitrite: x   Leuk Esterase: x / RBC: x / WBC x   Sq Epi: x / Non Sq Epi: x / Bacteria: x                    HOME MEDICATIONS:  Aricept 10 mg oral tablet: 1 tab(s) orally once a day  escitalopram 20 mg oral tablet: 1.5 tab(s) orally once a day  montelukast 10 mg oral tablet: 1 tab(s) orally once a day      MEDICATIONS:  STANDING MEDICATIONS  aspirin enteric coated 81 milliGRAM(s) Oral daily  atorvastatin 80 milliGRAM(s) Oral at bedtime  clopidogrel Tablet 75 milliGRAM(s) Oral daily  donepezil 10 milliGRAM(s) Oral at bedtime  enoxaparin Injectable 40 milliGRAM(s) SubCutaneous every 24 hours  escitalopram 30 milliGRAM(s) Oral daily  folic acid 1 milliGRAM(s) Oral daily  lisinopril 5 milliGRAM(s) Oral daily  montelukast 10 milliGRAM(s) Oral daily  thiamine 100 milliGRAM(s) Oral daily    PRN MEDICATIONS  acetaminophen     Tablet .. 650 milliGRAM(s) Oral every 6 hours PRN  LORazepam     Tablet 1 milliGRAM(s) Oral every 2 hours PRN  melatonin 3 milliGRAM(s) Oral at bedtime PRN

## 2024-05-24 NOTE — DISCHARGE NOTE PROVIDER - DISCHARGE SERVICE FOR PATIENT
Spoke to patient about this. Insulin changed to 24u Lantus.   on the discharge service for the patient. I have reviewed and made amendments to the documentation where necessary.

## 2024-05-24 NOTE — PROGRESS NOTE ADULT - ASSESSMENT
87 y/o F with PMHx of CABG, dementia, asthma, alcohol use disorder, and recent CVA with right leg weakness (on 5/19/2024) that presented to the ED on 5/22 with generalized weakness s/p two falls at home. Patient admitted for further workup s/p fall.     #Fall  - s/p two falls at home, denies head injury, denies LOC; patient is on ASA and Plavix for recent stroke but denies other AC use  - Vitals on admission: TMax 98.7, HR 60, /73 (on recheck 193/76 --> given Labetalol 10 mg IVP x1), O2 Sat 97% on RA  - Trauma workup on admission unremarkable, no fractures  - EEG : Global focal slowing; no epileptic activity seen; non specific   -TSH 1.79 ( normal )   -Echo pending ; follow up   - OT consult appreciated ; Recommended dc to Rehabilitation facility   - Physiatry consult -- on 5/20 (previous admission), patient seen by physiatry, decision for inpatient rehab was to be determined>>>  Consult on this admission pending.     #Ischemic stroke located in left BG, semiovale as seen on MRI with residual R sided weakness  - ischemic CVA w/ residual R sided weakness occurred on 5/19/2024  - vitals on admission significant for /73 --> on recheck 193/76 --> patient asymptomatic. Given Labetalol 10 mg IVP x1 --> BP improved 160/72  - goal SBP <160  - c/w ASA and plavix for 3 weeks (started 5/19; Plavix end date 6/10) and then ASA 81mg only  - c/w atorvastatin 80mg and lisinopril 5mg  - on recent discharge, patient required TTE and ILR to be done as outpatient  - EP consult placed for ILR; Follow up     #hx of CABG  # Hypertension:   - s/p 10 mg Labetalol in the ED   - Still hypertensive ; Started Lisinopril 5 mg   - Monitor BP and medication adjustment.     #Alcohol Use Disorder  - Patient's son reports that patient drinks approx 4-5 glasses of wine per day, has been drinking this way "for years"  - Patient is likely out of the window for delirium tremens as patient's son states that patient has not had alcohol since 5/19, but there is a chance that patient drank wine after discharge from hospital on 5/21  - [facility] protocol  - folic acid and thiamine supplementation  - f/u serum folate and Vitamin B12    #Dementia  - c/w Donepezil 10 mg qhs    #Depression  - c/w Lexapro 30 mg qd    #Asthma  - c/w Montelukast 10 mg qd    MISC  #DVT prophylaxis: Lovenox  #GI prophylaxis: PPI  #Diet: DASH/TLC  #Activity: Ambulate with Assistance      
87 y/o F with PMHx of CABG, dementia, asthma, alcohol use disorder, and recent CVA with right leg weakness (on 5/19/2024) that presented to the ED on 5/22 with generalized weakness s/p two falls at home. Patient admitted for further workup s/p fall.     # Fall  - s/p two falls at home, denies head injury, denies LOC; patient is on ASA and Plavix for recent stroke but denies other AC use  - Vitals on admission: TMax 98.7, HR 60, /73 (on recheck 193/76 --> given Labetalol 10 mg IVP x1), O2 Sat 97% on RA  - Trauma workup on admission unremarkable, no fractures  - EEG : Global focal slowing; no epileptic activity seen; non specific   -TSH 1.79 ( normal )   -Echo pending ; follow up   - OT consult appreciated ; Recommended dc to Rehabilitation facility   - Physiatry consult -- on 5/20 (previous admission), patient seen by physiatry, decision for inpatient rehab was to be determined>>>  Consult on this admission pending.     # Ischemic stroke located in left BG, semiovale as seen on MRI with residual R sided weakness  - ischemic CVA w/ residual R sided weakness occurred on 5/19/2024  - vitals on admission significant for /73 --> on recheck 193/76 --> patient asymptomatic. Given Labetalol 10 mg IVP x1 --> BP improved 160/72  - goal SBP <160  - c/w ASA and plavix for 3 weeks (started 5/19; Plavix end date 6/10) and then ASA 81mg only  - c/w atorvastatin 80mg and lisinopril 5mg  - EP recommending TTE with bubble study  - ILR placement today    # Hx of CABG  # Hypertension:   - s/p 10 mg Labetalol in the ED   - Still hypertensive ; Started Lisinopril 5 mg   - Monitor BP and medication adjustment.     #Alcohol Use Disorder  - Patient's son reports that patient drinks approx 4-5 glasses of wine per day, has been drinking this way "for years"  - Patient is likely out of the window for delirium tremens as patient's son states that patient has not had alcohol since 5/19, but there is a chance that patient drank wine after discharge from hospital on 5/21  - [facility] protocol  - folic acid and thiamine supplementation  - f/u serum folate and Vitamin B12    #Dementia  - c/w Donepezil 10 mg qhs    #Depression  - c/w Lexapro 30 mg qd    #Asthma  - c/w Montelukast 10 mg qd    MISC  #DVT prophylaxis: Lovenox  #GI prophylaxis: PPI  #Diet: DASH/TLC  #Activity: Ambulate with Assistance    Pending  ILR placement with EP today  TTE with bubble study

## 2024-05-24 NOTE — H&P ADULT - NSHPPHYSICALEXAM_GEN_ALL_CORE
PHYSICAL EXAMINATION   VItals: T(C): 37.2 (05-24-24 @ 04:37), Max: 37.2 (05-24-24 @ 04:37)  HR: 64 (05-24-24 @ 04:37) (60 - 64)  BP: 107/57 (05-24-24 @ 04:37) (107/57 - 128/72)  RR: 18 (05-24-24 @ 04:37) (17 - 18)  SpO2: 96% (05-24-24 @ 04:37) (92% - 96%)    General:[   ] NAD, Resting Comfortable,   [   ] other:                                HEENT: [   ] NC/AT, EOMI, PERRL , Normal Conjunctivae,   [   ] other:  Cardio: [   ] RRR, no murmer,   [   ] other:                              Pulm: [   ] No Respiratory Distress,  Lungs CTAB,   [   ] other:                       Abdomen: [   ]ND/NT, Soft,   [   ] other:    : [   ] NO URIAS CATHETER, [   ] URIAS CATHETER- no meatal tear, no discharge, [   ] other:                                            MSK: [   ] No joint swelling, Full ROM,   [   ] other:                                         Ext: [   ]No C/C/E, No calf tenderness,   [   ]other:    Skin: [   ]intact,   [   ] other:                                                                   Neurological Examination:  Cognitive: [    ] AAO x 3,   [    ]  other:                                                                      Attention:  [    ] intact,   [    ]  other:                            Memory: [    ] intact,    [    ]  other:     Mood/Affect: [    ] wnl,    [    ]  other:                                                                             Communication: [    ]Fluent, no dysarthria, following commands:  [    ] other:   CN II - XII:  [    ] intact,  [    ] other:                                                                                        Motor:   RIGHT UE: [   ] WNL,  [   ] other:  LEFT    UE: [   ] WNL,  [   ] other:  RIGHT LE: [   ] WNL,  [   ] other:   LEFT    LE: [   ] WNL,  [   ] other:    Tone: [    ] wnl,   [    ]  other:  DTRs: [   ]symmetric, [   ] other:  Coordination:   [    ] intact,   [    ] other:                                                                           Sensory: [    ] Intact to light touch,   [    ] other: PHYSICAL EXAMINATION   VItals: T(C): 37.2 (05-24-24 @ 04:37), Max: 37.2 (05-24-24 @ 04:37)  HR: 64 (05-24-24 @ 04:37) (60 - 64)  BP: 107/57 (05-24-24 @ 04:37) (107/57 - 128/72)  RR: 18 (05-24-24 @ 04:37) (17 - 18)  SpO2: 96% (05-24-24 @ 04:37) (92% - 96%)    General:[  x ] NAD, Resting Comfortable,   [   ] other:                                HEENT: [x   ] NC/AT, EOMI, PERRL , Normal Conjunctivae,   [   ] other:  Cardio: [x   ] RRR, no murmer,   [   ] other:                              Pulm: [  x ] No Respiratory Distress,  Lungs CTAB,   [   ] other:                       Abdomen: [  x ]ND/NT, Soft,   [   ] other:    : [  x ] NO URIAS CATHETER, [   ] URIAS CATHETER- no meatal tear, no discharge, [   ] other:                                            MSK: [  x ] No joint swelling, Full ROM,   [   ] other:                                         Ext: [ x  ]No C/C/E, No calf tenderness,   [   ]other:    Skin: [ x  ]intact,   [   ] other:                                                                   Neurological Examination:  Cognitive: [    ] AAO x 3,   [x    ]  other: AOx2, not to time                                                                     Attention:  [  x  ] intact,   [    ]  other:                            Memory: [    ] intact,    [ x   ]  other: 2/3 word recall    Mood/Affect: [   x ] wnl,    [    ]  other:                                                                             Communication: [    ]Fluent, no dysarthria, following commands:  [   x ] other: slow, hesitant speech, fluent, comprehension and repetition intact  CN II - XII:  [   x ] intact,  [    ] other:                                                                                        Motor:   RIGHT UE: [   ] WNL,  [   x] other: Shoulder abduction, EF/EF 4/5 hand  and wrist extension 5/5  LEFT    UE: [x   ] WNL,  [   ] other:  RIGHT LE: [   ] WNL,  [ x  ] other: HF 4/5 otherwise 5/5   LEFT    LE: [ x  ] WNL,  [   ] other:    Tone: [x    ] wnl,   [    ]  other:  DTRs: [ x  ]symmetric, [   ] other:  Coordination:   [   x ] intact,   [ x   ] other: decreased proprioception on RLE                                                                          Sensory: [ x   ] Intact to light touch,   [    ] other: PHYSICAL EXAMINATION   VItals: T(C): 37.2 (05-24-24 @ 04:37), Max: 37.2 (05-24-24 @ 04:37)  HR: 64 (05-24-24 @ 04:37) (60 - 64)  BP: 107/57 (05-24-24 @ 04:37) (107/57 - 128/72)  RR: 18 (05-24-24 @ 04:37) (17 - 18)  SpO2: 96% (05-24-24 @ 04:37) (92% - 96%)    General:[  x ] NAD, Resting Comfortable,   [   ] other:                                HEENT: [x   ] NC/AT, EOMI, PERRL , Normal Conjunctivae,   [   ] other:  Cardio: [x   ] RRR, no murmer,   [   ] other:                              Pulm: [  x ] No Respiratory Distress,  Lungs CTAB,   [   ] other:                       Abdomen: [  x ]ND/NT, Soft,   [   ] other:    : [  x ] NO URIAS CATHETER, [   ] URIAS CATHETER- no meatal tear, no discharge, [   ] other:                                            MSK: [  x ] No joint swelling, Full ROM,   [   ] other:                                         Ext: [ x  ]No C/C/E, No calf tenderness,   [   ]other:    Skin: [ x  ]intact,   [   ] other:                                                                   Neurological Examination:  Cognitive: [    ] AAO x 3,   [x    ]  other: AOx2, not to year                                                                    Attention:  [  x  ] intact,   [    ]  other:  able to follow 2 step commands and spell 'WORLD" backwards                          Memory: [    ] intact,    [ x   ]  other: 1/3 5 min. recall, 2/3 word recall with cues. Poor historian, poor insight.  Mood/Affect: [   x ] wnl,    [    ]  other:                                                                             Communication: [    ]Fluent, no dysarthria, following commands:  [   x ] other: slow, hesitant speech, fluent, comprehension and repetition intact, decreased word finding.  CN II - XII:  [   x ] intact,  [    ] other:                                                                                        Motor:   RIGHT UE: [   ] WNL,  [   x] other: Shoulder abduction, EF/EF 4/5 hand  and wrist extension 5/5  LEFT    UE: [x   ] WNL,  [   ] other:  RIGHT LE: [   ] WNL,  [ x  ] other: HF 4/5, DR 4/5  LEFT    LE: [ x  ] WNL,  [   ] other:    Tone: [x    ] wnl,   [    ]  other:  DTRs: [ x  ]symmetric, [   ] other:  Coordination:   [   x ] intact,   [ x   ] other: decreased proprioception on RLE                                                                          Sensory: [     ] Intact to light touch,   [  x  ] other: impaired kinesthetic awareness RUE, decreased proprioception right great toe > right hand

## 2024-05-24 NOTE — DISCHARGE NOTE PROVIDER - HOSPITAL COURSE
88y Female with PMHx of CAD status post CABG, alcohol use disorder, asthma presented to ED initally on 5/19  with complaints of sudden onset right sided weakness beginning last night at 10pm. pt woke up with generalized weakness that was nonlocalized which caused her to feel weak while standing. Pt endorses fall last week in which she hit her head slightly. Stroke code called in ED. /91 in triage. CTH negative, CTP negative, CTA head and neck showing mild stenosis. MRI showed acute infarct of the left corona/putamen.   Pt was started on DAPT and statin, and pt was discharged 5/21 per pt/family wishes with outpatient follow up for TTE and ILR. On 5/22 pt presented to ED with generalized weakness s/p two falls at home. Son stated that [facility] was at the patient's home evaluating her for home PT/OT when they noticed that patient was unsteady on her feet, and while speaking to son on the phone, patient fell from standing position onto her knees onto a carpeted floor, no head injury or LOC. Decision was made to call EMS; when EMS arrived and was beginning transport into [facility], patient once again fell from standing position onto her knees, no head injury or LOC. BP in ED was  193/76 and given IV labetalol. Trauma workup unremarkable.      Two episode of falls at home, denies head injury, denies LOC; patient is on ASA and Plavix for recent stroke but denies other AC use. Trauma workup on admission unremarkable, no fractures. EEG : Global focal slowing; no epileptic activity seen; non specific. MRI showed ischemic stroke located in left BG, semiovale with residual R sided weakness which occurred on 5/19/2024. Plan to c/w ASA and plavix for 3 weeks (started 5/19; Plavix end date 6/10) and then ASA 81mg only. c/w atorvastatin 80mg and lisinopril 5mg. EP recommending TTE with bubble study and patient is planned for ILR placement today. Hypertensive urgency in EDs/p 10 mg Labetalol in the ED. Was started on Lisinopril 5 mg daily. PMR team evaluated the patient and deemed her appropriate for acute rehab 2/2 decline in function with multiple comorbidities and ability to tolerate and benefit for 3 hours of therapy daily at least 5 days a week.     Pending  1.ILR placement with EP today  2.TTE with bubble study  3. Acute Rehab   88y Female with PMHx of CAD status post CABG, alcohol use disorder, asthma presented to ED initally on 5/19  with complaints of sudden onset right sided weakness beginning last night at 10pm. pt woke up with generalized weakness that was nonlocalized which caused her to feel weak while standing. Pt endorses fall last week in which she hit her head slightly. Stroke code called in ED. /91 in triage. CTH negative, CTP negative, CTA head and neck showing mild stenosis. MRI showed acute infarct of the left corona/putamen.   Pt was started on DAPT and statin, and pt was discharged 5/21 per pt/family wishes with outpatient follow up for TTE and ILR. On 5/22 pt presented to ED with generalized weakness s/p two falls at home. Son stated that [facility] was at the patient's home evaluating her for home PT/OT when they noticed that patient was unsteady on her feet, and while speaking to son on the phone, patient fell from standing position onto her knees onto a carpeted floor, no head injury or LOC. Decision was made to call EMS; when EMS arrived and was beginning transport into [facility], patient once again fell from standing position onto her knees, no head injury or LOC. BP in ED was  193/76 and given IV labetalol. Trauma workup unremarkable.      Two episode of falls at home, denies head injury, denies LOC; patient is on ASA and Plavix for recent stroke but denies other AC use. Trauma workup on admission unremarkable, no fractures. EEG : Global focal slowing; no epileptic activity seen; non specific. MRI showed ischemic stroke located in left BG, semiovale with residual R sided weakness which occurred on 5/19/2024. Plan to c/w ASA and plavix for 3 weeks (started 5/19; Plavix end date 6/10) and then ASA 81mg only. c/w atorvastatin 80mg and lisinopril 5mg. EP recommending TTE with bubble study and patient is planned for ILR placement today. Hypertensive urgency in EDs/p 10 mg Labetalol in the ED. Was started on Lisinopril 5 mg daily. PMR team evaluated the patient and deemed her appropriate for acute rehab 2/2 decline in function with multiple comorbidities and ability to tolerate and benefit for 3 hours of therapy daily at least 5 days a week.     Pending  1.ILR placement with EP today  2.TTE with bubble study -pending but can be followed up while patient is under rehab team (Allison from [facility])  3. Acute Rehab

## 2024-05-25 LAB
ALBUMIN SERPL ELPH-MCNC: 4.4 G/DL — SIGNIFICANT CHANGE UP (ref 3.5–5.2)
ALP SERPL-CCNC: 77 U/L — SIGNIFICANT CHANGE UP (ref 30–115)
ALT FLD-CCNC: 46 U/L — HIGH (ref 0–41)
ANION GAP SERPL CALC-SCNC: 16 MMOL/L — HIGH (ref 7–14)
AST SERPL-CCNC: 50 U/L — HIGH (ref 0–41)
BASOPHILS # BLD AUTO: 0.03 K/UL — SIGNIFICANT CHANGE UP (ref 0–0.2)
BASOPHILS NFR BLD AUTO: 0.5 % — SIGNIFICANT CHANGE UP (ref 0–1)
BILIRUB SERPL-MCNC: 0.3 MG/DL — SIGNIFICANT CHANGE UP (ref 0.2–1.2)
BUN SERPL-MCNC: 31 MG/DL — HIGH (ref 10–20)
CALCIUM SERPL-MCNC: 9.2 MG/DL — SIGNIFICANT CHANGE UP (ref 8.4–10.5)
CHLORIDE SERPL-SCNC: 105 MMOL/L — SIGNIFICANT CHANGE UP (ref 98–110)
CO2 SERPL-SCNC: 14 MMOL/L — LOW (ref 17–32)
CREAT SERPL-MCNC: 1.2 MG/DL — SIGNIFICANT CHANGE UP (ref 0.7–1.5)
EGFR: 44 ML/MIN/1.73M2 — LOW
EOSINOPHIL # BLD AUTO: 0.01 K/UL — SIGNIFICANT CHANGE UP (ref 0–0.7)
EOSINOPHIL NFR BLD AUTO: 0.2 % — SIGNIFICANT CHANGE UP (ref 0–8)
GLUCOSE SERPL-MCNC: 86 MG/DL — SIGNIFICANT CHANGE UP (ref 70–99)
HCT VFR BLD CALC: 43.9 % — SIGNIFICANT CHANGE UP (ref 37–47)
HGB BLD-MCNC: 14.5 G/DL — SIGNIFICANT CHANGE UP (ref 12–16)
IMM GRANULOCYTES NFR BLD AUTO: 0.2 % — SIGNIFICANT CHANGE UP (ref 0.1–0.3)
LYMPHOCYTES # BLD AUTO: 1.73 K/UL — SIGNIFICANT CHANGE UP (ref 1.2–3.4)
LYMPHOCYTES # BLD AUTO: 27.9 % — SIGNIFICANT CHANGE UP (ref 20.5–51.1)
MAGNESIUM SERPL-MCNC: 2.3 MG/DL — SIGNIFICANT CHANGE UP (ref 1.8–2.4)
MCHC RBC-ENTMCNC: 30.7 PG — SIGNIFICANT CHANGE UP (ref 27–31)
MCHC RBC-ENTMCNC: 33 G/DL — SIGNIFICANT CHANGE UP (ref 32–37)
MCV RBC AUTO: 93 FL — SIGNIFICANT CHANGE UP (ref 81–99)
MONOCYTES # BLD AUTO: 0.69 K/UL — HIGH (ref 0.1–0.6)
MONOCYTES NFR BLD AUTO: 11.1 % — HIGH (ref 1.7–9.3)
NEUTROPHILS # BLD AUTO: 3.72 K/UL — SIGNIFICANT CHANGE UP (ref 1.4–6.5)
NEUTROPHILS NFR BLD AUTO: 60.1 % — SIGNIFICANT CHANGE UP (ref 42.2–75.2)
NRBC # BLD: 0 /100 WBCS — SIGNIFICANT CHANGE UP (ref 0–0)
PLATELET # BLD AUTO: 250 K/UL — SIGNIFICANT CHANGE UP (ref 130–400)
PMV BLD: 10.2 FL — SIGNIFICANT CHANGE UP (ref 7.4–10.4)
POTASSIUM SERPL-MCNC: 4.3 MMOL/L — SIGNIFICANT CHANGE UP (ref 3.5–5)
POTASSIUM SERPL-SCNC: 4.3 MMOL/L — SIGNIFICANT CHANGE UP (ref 3.5–5)
PROT SERPL-MCNC: 7.1 G/DL — SIGNIFICANT CHANGE UP (ref 6–8)
RBC # BLD: 4.72 M/UL — SIGNIFICANT CHANGE UP (ref 4.2–5.4)
RBC # FLD: 14.6 % — HIGH (ref 11.5–14.5)
SODIUM SERPL-SCNC: 135 MMOL/L — SIGNIFICANT CHANGE UP (ref 135–146)
WBC # BLD: 6.19 K/UL — SIGNIFICANT CHANGE UP (ref 4.8–10.8)
WBC # FLD AUTO: 6.19 K/UL — SIGNIFICANT CHANGE UP (ref 4.8–10.8)

## 2024-05-25 RX ORDER — LOPERAMIDE HCL 2 MG
2 TABLET ORAL
Refills: 0 | Status: DISCONTINUED | OUTPATIENT
Start: 2024-05-25 | End: 2024-06-03

## 2024-05-25 RX ADMIN — Medication 81 MILLIGRAM(S): at 13:31

## 2024-05-25 RX ADMIN — MONTELUKAST 10 MILLIGRAM(S): 4 TABLET, CHEWABLE ORAL at 13:33

## 2024-05-25 RX ADMIN — ESCITALOPRAM OXALATE 30 MILLIGRAM(S): 10 TABLET, FILM COATED ORAL at 13:40

## 2024-05-25 RX ADMIN — ATORVASTATIN CALCIUM 80 MILLIGRAM(S): 80 TABLET, FILM COATED ORAL at 21:40

## 2024-05-25 RX ADMIN — PANTOPRAZOLE SODIUM 40 MILLIGRAM(S): 20 TABLET, DELAYED RELEASE ORAL at 06:01

## 2024-05-25 RX ADMIN — DONEPEZIL HYDROCHLORIDE 10 MILLIGRAM(S): 10 TABLET, FILM COATED ORAL at 21:40

## 2024-05-25 RX ADMIN — CHLORHEXIDINE GLUCONATE 1 APPLICATION(S): 213 SOLUTION TOPICAL at 05:12

## 2024-05-25 RX ADMIN — Medication 1 MILLIGRAM(S): at 13:32

## 2024-05-25 RX ADMIN — ENOXAPARIN SODIUM 40 MILLIGRAM(S): 100 INJECTION SUBCUTANEOUS at 05:13

## 2024-05-25 RX ADMIN — Medication 100 MILLIGRAM(S): at 13:32

## 2024-05-25 RX ADMIN — LISINOPRIL 5 MILLIGRAM(S): 2.5 TABLET ORAL at 05:12

## 2024-05-25 RX ADMIN — CLOPIDOGREL BISULFATE 75 MILLIGRAM(S): 75 TABLET, FILM COATED ORAL at 13:32

## 2024-05-25 NOTE — PROGRESS NOTE ADULT - SUBJECTIVE AND OBJECTIVE BOX
Patient is a 88y old  Female who presents with a chief complaint of Stroke with right hemiparesis (dominant) (24 May 2024 10:14)      HPI:  88y Female with PMHx of CAD status post CABG, alcohol use disorder, asthma presented to ED initially on 5/19  with complaints of sudden onset right sided weakness beginning last night at 10pm. pt woke up with generalized weakness that was nonlocalized which caused her to feel weak while standing. Pt endorses fall last week in which she hit her head slightly. Stroke code called in ED. /91 in triage. CTH negative, CTP negative, CTA head and neck showing mild stenosis. MRI showed acute infarct of the left corona radiata/putamen.  Pt was started on DAPT and statin, and pt was discharged 5/21 per pt/family wishes with outpatient follow up for TTE and ILR.     On 5/22 pt presented to ED with generalized weakness s/p two falls at home. Son stated that [facility] was at the patient's home evaluating her for home PT/OT when they noticed that patient was unsteady on her feet, and while speaking to son on the phone, Patient fell from standing position onto her knees onto a carpeted floor, no head injury or LOC. Decision was made to call EMS; when EMS arrived and was beginning transport into [facility], patient once again fell from standing position onto her knees, no head injury or LOC. BP in ED was  193/76 and given IV labetalol. Trauma workup unremarkable.  EPS saw patient, plan  for ILR and TTE, which they said can be done on the rehab unit. Once medically stabilized patient considered a good candidate for acute rehab.     PMR team evaluated the patient and deemed her appropriate for acute rehab 2/2 decline in function with multiple comorbidities and ability to tolerate and benefit for 3 hours of therapy daily at least 5 days a week.   PLOF: patient independent with ADLs and no AD for ambulation.   Admission LOF: Matt for bed mobility and transfers, ambulates 50ft with RW CGA, Matt fo rUBD, modA for LBD, dependent for toileting  LS: Pt lives alone in private house with 1 step to enter. Has an attic with 10 steps to enter. Of note, the patient admits to drinking 4-5 glasses of wine daily, and reportedly was drinking at home after her discharge from the hospital. At this point, she does not remember being home after her stroke admission.          (24 May 2024 10:14)      I examined the patient and reviewed the chart. There have been no significant changes since my history and physical except where documented below.    TODAY'S SUBJECTIVE & REVIEW OF SYMPTOMS:  pt seen at bedside. doing well. this morning patient had diarrhea episode and then felt dizzy. She was brought back to bed, vitals checked, and pt symptoms resolved. Offers no complaints. Can continue therapy at bedside as tolerated. vitals reviewed. loperamide prn ordered.       Constitutional:    [ x  ] WNL           [   ] poor appetite   [   ] insomnia   [   ] tired   Cardio:                [  x ] WNL           [   ] CP   [   ] MISTRY   [   ] palpitations               Resp:                   [ x  ] WNL           [   ] SOB   [   ] cough   [   ] wheezing   GI:                        [   ] WNL           [   ] constipation   [   x] diarrhea [   ] abdominal pain   [   ] nausea   [   ] emesis                                :                      [  x ] WNL           [   ] URIAS  [   ] dysuria   [   ] difficulty voiding             Endo:                   [  x ] WNL          [   ] polyuria   [   ] temperature intolerance                 Skin:                     [  x ] WNL          [   ] pain   [   ] wound   [   ] rash   MSK:                    [ x  ] WNL          [   ] muscle pain   [   ] joint pain/ stiffness   [   ] muscle tenderness   [   ] swelling   Neuro:                 [   ] WNL          [   ] HA   [   ] change in vision   [   ] tremor   [x   ] weakness   [   ]dysphagia              Cognitive:           [   ] WNL           [  x ]confusion    (patient is confused and a poor historian, though not aware of her deficits)   Psych:                  [ x  ] WNL           [   ] hallucinations   [   ]agitation   [   ] delusion   [   ]depression        PHYSICAL EXAM    Vital Signs Last 24 Hrs  T(C): 36.8 (25 May 2024 04:48), Max: 37.1 (24 May 2024 18:38)  T(F): 98.2 (25 May 2024 04:48), Max: 98.7 (24 May 2024 18:38)  HR: 71 (25 May 2024 04:48) (64 - 75)  BP: 117/65 (25 May 2024 04:48) (117/65 - 133/72)  BP(mean): 82 (25 May 2024 04:48) (82 - 93)  RR: 18 (25 May 2024 04:48) (18 - 18)  SpO2: 96% (24 May 2024 18:38) (95% - 96%)    Parameters below as of 24 May 2024 18:38  Patient On (Oxygen Delivery Method): room air      General:[  x ] NAD, Resting Comfortable,   [   ] other:                                HEENT: [x   ] NC/AT, EOMI, PERRL , Normal Conjunctivae,   [   ] other:  Cardio: [x   ] RRR, no murmer,   [   ] other:                              Pulm: [  x ] No Respiratory Distress,  Lungs CTAB,   [   ] other:                       Abdomen: [  x ]ND/NT, Soft,   [   ] other:    : [  x ] NO URIAS CATHETER, [   ] URIAS CATHETER- no meatal tear, no discharge, [   ] other:                                            MSK: [  x ] No joint swelling, Full ROM,   [   ] other:                                         Ext: [ x  ]No C/C/E, No calf tenderness,   [   ]other:    Skin: [ x  ]intact,   [   ] other:                                                                   Neurological Examination:  Cognitive: [    ] AAO x 3,   [x    ]  other: AOx2, not to year                                                                    Attention:  [  x  ] intact,   [    ]  other:  able to follow 2 step commands and spell 'WORLD" backwards                          Memory: [    ] intact,    [ x   ]  other: 1/3 5 min. recall, 2/3 word recall with cues. Poor historian, poor insight.  Mood/Affect: [   x ] wnl,    [    ]  other:                                                                             Communication: [    ]Fluent, no dysarthria, following commands:  [   x ] other: slow, hesitant speech, fluent, comprehension and repetition intact, decreased word finding.  CN II - XII:  [   x ] intact,  [    ] other:                                                                                        Motor:   RIGHT UE: [   ] WNL,  [   x] other: Shoulder abduction, EF/EF 4/5 hand  and wrist extension 5/5  LEFT    UE: [x   ] WNL,  [   ] other:  RIGHT LE: [   ] WNL,  [ x  ] other: HF 4/5, DR 4/5  LEFT    LE: [ x  ] WNL,  [   ] other:    Tone: [x    ] wnl,   [    ]  other:  DTRs: [ x  ]symmetric, [   ] other:  Coordination:   [   x ] intact,   [ x   ] other: decreased proprioception on RLE                                                                          Sensory: [     ] Intact to light touch,   [  x  ] other: impaired kinesthetic awareness RUE, decreased proprioception right great toe > right hand      MEDICATIONS  (STANDING):  aspirin enteric coated 81 milliGRAM(s) Oral daily  atorvastatin 80 milliGRAM(s) Oral at bedtime  chlorhexidine 2% Cloths 1 Application(s) Topical <User Schedule>  clopidogrel Tablet 75 milliGRAM(s) Oral daily  donepezil 10 milliGRAM(s) Oral at bedtime  enoxaparin Injectable 40 milliGRAM(s) SubCutaneous every 24 hours  escitalopram 30 milliGRAM(s) Oral daily  folic acid 1 milliGRAM(s) Oral daily  lisinopril 5 milliGRAM(s) Oral daily  montelukast 10 milliGRAM(s) Oral daily  pantoprazole    Tablet 40 milliGRAM(s) Oral before breakfast  thiamine 100 milliGRAM(s) Oral daily    MEDICATIONS  (PRN):  acetaminophen     Tablet .. 650 milliGRAM(s) Oral every 6 hours PRN Temp greater or equal to 38C (100.4F), Mild Pain (1 - 3), Moderate Pain (4 - 6), Severe Pain (7 - 10)  loperamide 2 milliGRAM(s) Oral two times a day PRN Diarrhea  LORazepam     Tablet 1 milliGRAM(s) Oral every 2 hours PRN CIWA-Ar score increase by 2 points and a total score of 7 or less  melatonin 3 milliGRAM(s) Oral at bedtime PRN Insomnia        RECENT LABS/IMAGING                        14.5   6.19  )-----------( 250      ( 25 May 2024 04:30 )             43.9     05-25    135  |  105  |  31<H>  ----------------------------<  86  4.3   |  14<L>  |  1.2    Ca    9.2      25 May 2024 04:30  Mg     2.3     05-25    TPro  7.1  /  Alb  4.4  /  TBili  0.3  /  DBili  x   /  AST  50<H>  /  ALT  46<H>  /  AlkPhos  77  05-25      Urinalysis Basic - ( 25 May 2024 04:30 )    Color: x / Appearance: x / SG: x / pH: x  Gluc: 86 mg/dL / Ketone: x  / Bili: x / Urobili: x   Blood: x / Protein: x / Nitrite: x   Leuk Esterase: x / RBC: x / WBC x   Sq Epi: x / Non Sq Epi: x / Bacteria: x

## 2024-05-25 NOTE — PROGRESS NOTE ADULT - ASSESSMENT
ASSESSMENT/PLAN  89 y/o F with PMHx of CABG, dementia, asthma, alcohol use disorder, and recent CVA with right sided weakness (on 5/19/2024) that presented to the ED on 5/22 with generalized weakness s/p two falls at home.    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  - Trauma workup on admission unremarkable, no fractures  - EEG : Global focal slowing; no epileptic activity seen; non specific   -Echo pending - can be done on rehab per EPS and Neuro  -plan for ILR on rehab  - c/w ASA and plavix for 3 weeks (started 5/19; Plavix end date 6/10) and then ASA 81mg only  -c/w lipitor 80mg   -PT/OT/SLP eval and treat   -neuropsych consult    # CAD s/p CABG  # Hypertension:   - pt started on Lisinopril 5 mg   - Monitor BP adjust medications as necessary     #HTN - uncontrolled on admission  - continue Lisinopril and monitor and adjust    #Alcohol Use Disorder  - Patient reports that patient drinks approx 4-5 glasses of wine per day, has been drinking this way "for years"  - folic acid and thiamine supplementation    #diarrhea   - possible related to ETOH w/drawl  -loperamide prn  - monitor    #Long-term memory impairment with confabulation and poor insight c/w Wernicke's Encephalopathy  - c/w Donepezil 10 mg qhs  - thiamine  - B12 and TSH WNL    #Depression  - c/w Lexapro 30 mg qd    #Asthma  - c/w Montelukast 10 mg qd      -Pain control: Tylenol prn     -Skin:  No active issues at this time    - Diet: DASH/TLC           Precautions / PROPHYLAXIS:      - Falls      - DVT prophylaxis: lovenox

## 2024-05-26 RX ORDER — SODIUM CHLORIDE 9 MG/ML
1000 INJECTION INTRAMUSCULAR; INTRAVENOUS; SUBCUTANEOUS
Refills: 0 | Status: DISCONTINUED | OUTPATIENT
Start: 2024-05-26 | End: 2024-06-03

## 2024-05-26 RX ADMIN — CLOPIDOGREL BISULFATE 75 MILLIGRAM(S): 75 TABLET, FILM COATED ORAL at 14:00

## 2024-05-26 RX ADMIN — LISINOPRIL 5 MILLIGRAM(S): 2.5 TABLET ORAL at 06:26

## 2024-05-26 RX ADMIN — Medication 81 MILLIGRAM(S): at 14:00

## 2024-05-26 RX ADMIN — Medication 100 MILLIGRAM(S): at 19:07

## 2024-05-26 RX ADMIN — ATORVASTATIN CALCIUM 80 MILLIGRAM(S): 80 TABLET, FILM COATED ORAL at 21:07

## 2024-05-26 RX ADMIN — ENOXAPARIN SODIUM 40 MILLIGRAM(S): 100 INJECTION SUBCUTANEOUS at 06:26

## 2024-05-26 RX ADMIN — Medication 1 MILLIGRAM(S): at 13:58

## 2024-05-26 RX ADMIN — MONTELUKAST 10 MILLIGRAM(S): 4 TABLET, CHEWABLE ORAL at 13:58

## 2024-05-26 RX ADMIN — DONEPEZIL HYDROCHLORIDE 10 MILLIGRAM(S): 10 TABLET, FILM COATED ORAL at 21:07

## 2024-05-26 RX ADMIN — CHLORHEXIDINE GLUCONATE 1 APPLICATION(S): 213 SOLUTION TOPICAL at 06:54

## 2024-05-26 RX ADMIN — ESCITALOPRAM OXALATE 30 MILLIGRAM(S): 10 TABLET, FILM COATED ORAL at 13:57

## 2024-05-26 RX ADMIN — SODIUM CHLORIDE 75 MILLILITER(S): 9 INJECTION INTRAMUSCULAR; INTRAVENOUS; SUBCUTANEOUS at 19:54

## 2024-05-26 RX ADMIN — PANTOPRAZOLE SODIUM 40 MILLIGRAM(S): 20 TABLET, DELAYED RELEASE ORAL at 06:26

## 2024-05-26 NOTE — PROGRESS NOTE ADULT - SUBJECTIVE AND OBJECTIVE BOX
Patient is a 88y old  Female who presents with a chief complaint of Stroke with right hemiparesis (dominant) (24 May 2024 10:14)      HPI:  88y Female with PMHx of CAD status post CABG, alcohol use disorder, asthma presented to ED initially on 5/19  with complaints of sudden onset right sided weakness beginning last night at 10pm. pt woke up with generalized weakness that was nonlocalized which caused her to feel weak while standing. Pt endorses fall last week in which she hit her head slightly. Stroke code called in ED. /91 in triage. CTH negative, CTP negative, CTA head and neck showing mild stenosis. MRI showed acute infarct of the left corona radiata/putamen.  Pt was started on DAPT and statin, and pt was discharged 5/21 per pt/family wishes with outpatient follow up for TTE and ILR.     On 5/22 pt presented to ED with generalized weakness s/p two falls at home. Son stated that [facility] was at the patient's home evaluating her for home PT/OT when they noticed that patient was unsteady on her feet, and while speaking to son on the phone, Patient fell from standing position onto her knees onto a carpeted floor, no head injury or LOC. Decision was made to call EMS; when EMS arrived and was beginning transport into [facility], patient once again fell from standing position onto her knees, no head injury or LOC. BP in ED was  193/76 and given IV labetalol. Trauma workup unremarkable.  EPS saw patient, plan  for ILR and TTE, which they said can be done on the rehab unit. Once medically stabilized patient considered a good candidate for acute rehab.     PMR team evaluated the patient and deemed her appropriate for acute rehab 2/2 decline in function with multiple comorbidities and ability to tolerate and benefit for 3 hours of therapy daily at least 5 days a week.   PLOF: patient independent with ADLs and no AD for ambulation.   Admission LOF: Matt for bed mobility and transfers, ambulates 50ft with RW CGA, Matt fo rUBD, modA for LBD, dependent for toileting  LS: Pt lives alone in private house with 1 step to enter. Has an attic with 10 steps to enter. Of note, the patient admits to drinking 4-5 glasses of wine daily, and reportedly was drinking at home after her discharge from the hospital. At this point, she does not remember being home after her stroke admission.          (24 May 2024 10:14)      I examined the patient and reviewed the chart. There have been no significant changes since my history and physical except where documented below.    TODAY'S SUBJECTIVE & REVIEW OF SYMPTOMS:  pt seen at bedside. doing well. tolerating and participating in therapies. vitals reviewed.       Constitutional:    [ x  ] WNL           [   ] poor appetite   [   ] insomnia   [   ] tired   Cardio:                [  x ] WNL           [   ] CP   [   ] MISTRY   [   ] palpitations               Resp:                   [ x  ] WNL           [   ] SOB   [   ] cough   [   ] wheezing   GI:                        [   ] WNL           [   ] constipation   [   x] diarrhea [   ] abdominal pain   [   ] nausea   [   ] emesis                                :                      [  x ] WNL           [   ] URIAS  [   ] dysuria   [   ] difficulty voiding             Endo:                   [  x ] WNL          [   ] polyuria   [   ] temperature intolerance                 Skin:                     [  x ] WNL          [   ] pain   [   ] wound   [   ] rash   MSK:                    [ x  ] WNL          [   ] muscle pain   [   ] joint pain/ stiffness   [   ] muscle tenderness   [   ] swelling   Neuro:                 [   ] WNL          [   ] HA   [   ] change in vision   [   ] tremor   [x   ] weakness   [   ]dysphagia              Cognitive:           [   ] WNL           [  x ]confusion    (patient is confused and a poor historian, though not aware of her deficits)   Psych:                  [ x  ] WNL           [   ] hallucinations   [   ]agitation   [   ] delusion   [   ]depression        PHYSICAL EXAM    Vital Signs Last 24 Hrs  T(C): 36.7 (26 May 2024 06:01), Max: 36.9 (25 May 2024 11:40)  T(F): 98 (26 May 2024 06:01), Max: 98.4 (25 May 2024 11:40)  HR: 74 (26 May 2024 06:01) (58 - 74)  BP: 145/80 (26 May 2024 06:01) (139/82 - 145/80)  BP(mean): 102 (26 May 2024 06:01) (99 - 102)  RR: 20 (26 May 2024 06:01) (20 - 20)  SpO2: --        General:[  x ] NAD, Resting Comfortable,   [   ] other:                                HEENT: [x   ] NC/AT, EOMI, PERRL , Normal Conjunctivae,   [   ] other:  Cardio: [x   ] RRR, no murmer,   [   ] other:                              Pulm: [  x ] No Respiratory Distress,  Lungs CTAB,   [   ] other:                       Abdomen: [  x ]ND/NT, Soft,   [   ] other:    : [  x ] NO URIAS CATHETER, [   ] URIAS CATHETER- no meatal tear, no discharge, [   ] other:                                            MSK: [  x ] No joint swelling, Full ROM,   [   ] other:                                         Ext: [ x  ]No C/C/E, No calf tenderness,   [   ]other:    Skin: [ x  ]intact,   [   ] other:                                                                   Neurological Examination:  Cognitive: [    ] AAO x 3,   [x    ]  other: AOx2, not to year                                                                    Attention:  [  x  ] intact,   [    ]  other:  able to follow 2 step commands and spell 'WORLD" backwards                          Memory: [    ] intact,    [ x   ]  other: 1/3 5 min. recall, 2/3 word recall with cues. Poor historian, poor insight.  Mood/Affect: [   x ] wnl,    [    ]  other:                                                                             Communication: [    ]Fluent, no dysarthria, following commands:  [   x ] other: slow, hesitant speech, fluent, comprehension and repetition intact, decreased word finding.  CN II - XII:  [   x ] intact,  [    ] other:                                                                                        Motor:   RIGHT UE: [   ] WNL,  [   x] other: Shoulder abduction, EF/EF 4/5 hand  and wrist extension 5/5  LEFT    UE: [x   ] WNL,  [   ] other:  RIGHT LE: [   ] WNL,  [ x  ] other: HF 4/5, DR 4/5  LEFT    LE: [ x  ] WNL,  [   ] other:    Tone: [x    ] wnl,   [    ]  other:  DTRs: [ x  ]symmetric, [   ] other:  Coordination:   [   x ] intact,   [ x   ] other: decreased proprioception on RLE                                                                          Sensory: [     ] Intact to light touch,   [  x  ] other: impaired kinesthetic awareness RUE, decreased proprioception right great toe > right hand      MEDICATIONS  (STANDING):  aspirin enteric coated 81 milliGRAM(s) Oral daily  atorvastatin 80 milliGRAM(s) Oral at bedtime  chlorhexidine 2% Cloths 1 Application(s) Topical <User Schedule>  clopidogrel Tablet 75 milliGRAM(s) Oral daily  donepezil 10 milliGRAM(s) Oral at bedtime  enoxaparin Injectable 40 milliGRAM(s) SubCutaneous every 24 hours  escitalopram 30 milliGRAM(s) Oral daily  folic acid 1 milliGRAM(s) Oral daily  lisinopril 5 milliGRAM(s) Oral daily  montelukast 10 milliGRAM(s) Oral daily  pantoprazole    Tablet 40 milliGRAM(s) Oral before breakfast  thiamine 100 milliGRAM(s) Oral daily    MEDICATIONS  (PRN):  acetaminophen     Tablet .. 650 milliGRAM(s) Oral every 6 hours PRN Temp greater or equal to 38C (100.4F), Mild Pain (1 - 3), Moderate Pain (4 - 6), Severe Pain (7 - 10)  loperamide 2 milliGRAM(s) Oral two times a day PRN Diarrhea  LORazepam     Tablet 1 milliGRAM(s) Oral every 2 hours PRN CIWA-Ar score increase by 2 points and a total score of 7 or less  melatonin 3 milliGRAM(s) Oral at bedtime PRN Insomnia        RECENT LABS/IMAGING                        14.5   6.19  )-----------( 250      ( 25 May 2024 04:30 )             43.9     05-25    135  |  105  |  31<H>  ----------------------------<  86  4.3   |  14<L>  |  1.2    Ca    9.2      25 May 2024 04:30  Mg     2.3     05-25    TPro  7.1  /  Alb  4.4  /  TBili  0.3  /  DBili  x   /  AST  50<H>  /  ALT  46<H>  /  AlkPhos  77  05-25      Urinalysis Basic - ( 25 May 2024 04:30 )    Color: x / Appearance: x / SG: x / pH: x  Gluc: 86 mg/dL / Ketone: x  / Bili: x / Urobili: x   Blood: x / Protein: x / Nitrite: x   Leuk Esterase: x / RBC: x / WBC x   Sq Epi: x / Non Sq Epi: x / Bacteria: x   Patient is a 88y old  Female who presents with a chief complaint of Stroke with right hemiparesis (dominant) (24 May 2024 10:14)      HPI:  88y Female with PMHx of CAD status post CABG, alcohol use disorder, asthma presented to ED initially on 5/19  with complaints of sudden onset right sided weakness beginning last night at 10pm. pt woke up with generalized weakness that was nonlocalized which caused her to feel weak while standing. Pt endorses fall last week in which she hit her head slightly. Stroke code called in ED. /91 in triage. CTH negative, CTP negative, CTA head and neck showing mild stenosis. MRI showed acute infarct of the left corona radiata/putamen.  Pt was started on DAPT and statin, and pt was discharged 5/21 per pt/family wishes with outpatient follow up for TTE and ILR.     On 5/22 pt presented to ED with generalized weakness s/p two falls at home. Son stated that [facility] was at the patient's home evaluating her for home PT/OT when they noticed that patient was unsteady on her feet, and while speaking to son on the phone, Patient fell from standing position onto her knees onto a carpeted floor, no head injury or LOC. Decision was made to call EMS; when EMS arrived and was beginning transport into [facility], patient once again fell from standing position onto her knees, no head injury or LOC. BP in ED was  193/76 and given IV labetalol. Trauma workup unremarkable.  EPS saw patient, plan  for ILR and TTE, which they said can be done on the rehab unit. Once medically stabilized patient considered a good candidate for acute rehab.     PMR team evaluated the patient and deemed her appropriate for acute rehab 2/2 decline in function with multiple comorbidities and ability to tolerate and benefit for 3 hours of therapy daily at least 5 days a week.   PLOF: patient independent with ADLs and no AD for ambulation.   Admission LOF: Matt for bed mobility and transfers, ambulates 50ft with RW CGA, Matt fo rUBD, modA for LBD, dependent for toileting  LS: Pt lives alone in private house with 1 step to enter. Has an attic with 10 steps to enter. Of note, the patient admits to drinking 4-5 glasses of wine daily, and reportedly was drinking at home after her discharge from the hospital. At this point, she does not remember being home after her stroke admission.          (24 May 2024 10:14)      I examined the patient and reviewed the chart. There have been no significant changes since my history and physical except where documented below.    TODAY'S SUBJECTIVE & REVIEW OF SYMPTOMS:  pt seen at bedside. had an episode of diarrhea yesterday. low lactose diet ordered. will order IVF after therapies. tolerating and participating in therapies. vitals reviewed.       Constitutional:    [ x  ] WNL           [   ] poor appetite   [   ] insomnia   [   ] tired   Cardio:                [  x ] WNL           [   ] CP   [   ] MISTRY   [   ] palpitations               Resp:                   [ x  ] WNL           [   ] SOB   [   ] cough   [   ] wheezing   GI:                        [   ] WNL           [   ] constipation   [   x] diarrhea [   ] abdominal pain   [   ] nausea   [   ] emesis                                :                      [  x ] WNL           [   ] URIAS  [   ] dysuria   [   ] difficulty voiding             Endo:                   [  x ] WNL          [   ] polyuria   [   ] temperature intolerance                 Skin:                     [  x ] WNL          [   ] pain   [   ] wound   [   ] rash   MSK:                    [ x  ] WNL          [   ] muscle pain   [   ] joint pain/ stiffness   [   ] muscle tenderness   [   ] swelling   Neuro:                 [   ] WNL          [   ] HA   [   ] change in vision   [   ] tremor   [x   ] weakness   [   ]dysphagia              Cognitive:           [   ] WNL           [  x ]confusion    (patient is confused and a poor historian, though not aware of her deficits)   Psych:                  [ x  ] WNL           [   ] hallucinations   [   ]agitation   [   ] delusion   [   ]depression        PHYSICAL EXAM    Vital Signs Last 24 Hrs  T(C): 36.7 (26 May 2024 06:01), Max: 36.9 (25 May 2024 11:40)  T(F): 98 (26 May 2024 06:01), Max: 98.4 (25 May 2024 11:40)  HR: 74 (26 May 2024 06:01) (58 - 74)  BP: 145/80 (26 May 2024 06:01) (139/82 - 145/80)  BP(mean): 102 (26 May 2024 06:01) (99 - 102)  RR: 20 (26 May 2024 06:01) (20 - 20)  SpO2: --        General:[  x ] NAD, Resting Comfortable,   [   ] other:                                HEENT: [x   ] NC/AT, EOMI, PERRL , Normal Conjunctivae,   [   ] other:  Cardio: [x   ] RRR, no murmer,   [   ] other:                              Pulm: [  x ] No Respiratory Distress,  Lungs CTAB,   [   ] other:                       Abdomen: [  x ]ND/NT, Soft,   [   ] other:    : [  x ] NO URIAS CATHETER, [   ] URIAS CATHETER- no meatal tear, no discharge, [   ] other:                                            MSK: [  x ] No joint swelling, Full ROM,   [   ] other:                                         Ext: [ x  ]No C/C/E, No calf tenderness,   [   ]other:    Skin: [ x  ]intact,   [   ] other:                                                                   Neurological Examination:  Cognitive: [    ] AAO x 3,   [x    ]  other: AOx2, not to year                                                                    Attention:  [  x  ] intact,   [    ]  other:  able to follow 2 step commands and spell 'WORLD" backwards                          Memory: [    ] intact,    [ x   ]  other: 1/3 5 min. recall, 2/3 word recall with cues. Poor historian, poor insight.  Mood/Affect: [   x ] wnl,    [    ]  other:                                                                             Communication: [    ]Fluent, no dysarthria, following commands:  [   x ] other: slow, hesitant speech, fluent, comprehension and repetition intact, decreased word finding.  CN II - XII:  [   x ] intact,  [    ] other:                                                                                        Motor:   RIGHT UE: [   ] WNL,  [   x] other: Shoulder abduction, EF/EF 4/5 hand  and wrist extension 5/5  LEFT    UE: [x   ] WNL,  [   ] other:  RIGHT LE: [   ] WNL,  [ x  ] other: HF 4/5, DR 4/5  LEFT    LE: [ x  ] WNL,  [   ] other:    Tone: [x    ] wnl,   [    ]  other:  DTRs: [ x  ]symmetric, [   ] other:  Coordination:   [   x ] intact,   [ x   ] other: decreased proprioception on RLE                                                                          Sensory: [     ] Intact to light touch,   [  x  ] other: impaired kinesthetic awareness RUE, decreased proprioception right great toe > right hand      MEDICATIONS  (STANDING):  aspirin enteric coated 81 milliGRAM(s) Oral daily  atorvastatin 80 milliGRAM(s) Oral at bedtime  chlorhexidine 2% Cloths 1 Application(s) Topical <User Schedule>  clopidogrel Tablet 75 milliGRAM(s) Oral daily  donepezil 10 milliGRAM(s) Oral at bedtime  enoxaparin Injectable 40 milliGRAM(s) SubCutaneous every 24 hours  escitalopram 30 milliGRAM(s) Oral daily  folic acid 1 milliGRAM(s) Oral daily  lisinopril 5 milliGRAM(s) Oral daily  montelukast 10 milliGRAM(s) Oral daily  pantoprazole    Tablet 40 milliGRAM(s) Oral before breakfast  thiamine 100 milliGRAM(s) Oral daily    MEDICATIONS  (PRN):  acetaminophen     Tablet .. 650 milliGRAM(s) Oral every 6 hours PRN Temp greater or equal to 38C (100.4F), Mild Pain (1 - 3), Moderate Pain (4 - 6), Severe Pain (7 - 10)  loperamide 2 milliGRAM(s) Oral two times a day PRN Diarrhea  LORazepam     Tablet 1 milliGRAM(s) Oral every 2 hours PRN CIWA-Ar score increase by 2 points and a total score of 7 or less  melatonin 3 milliGRAM(s) Oral at bedtime PRN Insomnia        RECENT LABS/IMAGING                        14.5   6.19  )-----------( 250      ( 25 May 2024 04:30 )             43.9     05-25    135  |  105  |  31<H>  ----------------------------<  86  4.3   |  14<L>  |  1.2    Ca    9.2      25 May 2024 04:30  Mg     2.3     05-25    TPro  7.1  /  Alb  4.4  /  TBili  0.3  /  DBili  x   /  AST  50<H>  /  ALT  46<H>  /  AlkPhos  77  05-25      Urinalysis Basic - ( 25 May 2024 04:30 )    Color: x / Appearance: x / SG: x / pH: x  Gluc: 86 mg/dL / Ketone: x  / Bili: x / Urobili: x   Blood: x / Protein: x / Nitrite: x   Leuk Esterase: x / RBC: x / WBC x   Sq Epi: x / Non Sq Epi: x / Bacteria: x   Patient is a 88y old  Female who presents with a chief complaint of Stroke with right hemiparesis (dominant) (24 May 2024 10:14)      HPI:  88y Female with PMHx of CAD status post CABG, alcohol use disorder, asthma presented to ED initially on 5/19  with complaints of sudden onset right sided weakness beginning last night at 10pm. pt woke up with generalized weakness that was nonlocalized which caused her to feel weak while standing. Pt endorses fall last week in which she hit her head slightly. Stroke code called in ED. /91 in triage. CTH negative, CTP negative, CTA head and neck showing mild stenosis. MRI showed acute infarct of the left corona radiata/putamen.  Pt was started on DAPT and statin, and pt was discharged 5/21 per pt/family wishes with outpatient follow up for TTE and ILR.     On 5/22 pt presented to ED with generalized weakness s/p two falls at home. Son stated that [facility] was at the patient's home evaluating her for home PT/OT when they noticed that patient was unsteady on her feet, and while speaking to son on the phone, Patient fell from standing position onto her knees onto a carpeted floor, no head injury or LOC. Decision was made to call EMS; when EMS arrived and was beginning transport into [facility], patient once again fell from standing position onto her knees, no head injury or LOC. BP in ED was  193/76 and given IV labetalol. Trauma workup unremarkable.  EPS saw patient, plan  for ILR and TTE, which they said can be done on the rehab unit. Once medically stabilized patient considered a good candidate for acute rehab.     PMR team evaluated the patient and deemed her appropriate for acute rehab 2/2 decline in function with multiple comorbidities and ability to tolerate and benefit for 3 hours of therapy daily at least 5 days a week.   PLOF: patient independent with ADLs and no AD for ambulation.   Admission LOF: Matt for bed mobility and transfers, ambulates 50ft with RW CGA, Matt fo rUBD, modA for LBD, dependent for toileting  LS: Pt lives alone in private house with 1 step to enter. Has an attic with 10 steps to enter. Of note, the patient admits to drinking 4-5 glasses of wine daily, and reportedly was drinking at home after her discharge from the hospital. At this point, she does not remember being home after her stroke admission.    (24 May 2024 10:14)    TODAY'S SUBJECTIVE & REVIEW OF SYMPTOMS:  pt seen at bedside. had an episode of diarrhea yesterday. low lactose diet ordered. Reportedly felt weak with standing with PT this morning, c/w orthostasis. will order IVF after therapies. tolerating and participating in therapies. vitals and labs reviewed.       Constitutional:    [ x  ] WNL           [   ] poor appetite   [   ] insomnia   [   ] tired   Cardio:                [   ] WNL           [   ] CP   [   ] MISTRY   [   ] palpitations    [x] weak after standing in PT           Resp:                   [ x  ] WNL           [   ] SOB   [   ] cough   [   ] wheezing   GI:                        [   ] WNL           [   ] constipation   [   x] diarrhea (family reports chronic) [   ] abdominal pain   [   ] nausea   [   ] emesis                                :                      [  x ] WNL           [   ] URIAS  [   ] dysuria   [   ] difficulty voiding             Endo:                   [  x ] WNL          [   ] polyuria   [   ] temperature intolerance                 Skin:                     [  x ] WNL          [   ] pain   [   ] wound   [   ] rash   MSK:                    [ x  ] WNL          [   ] muscle pain   [   ] joint pain/ stiffness   [   ] muscle tenderness   [   ] swelling   Neuro:                 [   ] WNL          [   ] HA   [   ] change in vision   [   ] tremor   [x   ] weakness   [   ]dysphagia              Cognitive:           [   ] WNL           [  x ]confusion    (patient is confused and a poor historian, though not aware of her deficits)   Psych:                  [ x  ] WNL           [   ] hallucinations   [   ]agitation   [   ] delusion   [   ]depression        PHYSICAL EXAM    Vital Signs Last 24 Hrs  T(C): 36.7 (26 May 2024 06:01), Max: 36.9 (25 May 2024 11:40)  T(F): 98 (26 May 2024 06:01), Max: 98.4 (25 May 2024 11:40)  HR: 74 (26 May 2024 06:01) (58 - 74)  BP: 145/80 (26 May 2024 06:01) (139/82 - 145/80)  BP(mean): 102 (26 May 2024 06:01) (99 - 102)  RR: 20 (26 May 2024 06:01) (20 - 20)  SpO2: --        General:[  x ] NAD, Resting Comfortable,   [   ] other:                                HEENT: [x   ] NC/AT, EOMI, PERRL , Normal Conjunctivae,   [   ] other:  Cardio: [x   ] RRR, no murmer,   [   ] other:                              Pulm: [  x ] No Respiratory Distress,  Lungs CTAB,   [   ] other:                       Abdomen: [  x ]ND/NT, Soft,   [   ] other:    : [  x ] NO URIAS CATHETER, [   ] URIAS CATHETER- no meatal tear, no discharge, [   ] other:                                            MSK: [  x ] No joint swelling, Full ROM,   [   ] other:                                         Ext: [ x  ]No C/C/E, No calf tenderness,   [   ]other:    Skin: [ x  ]intact,   [ x  ] other:  significant tenting of skin back of hand                                                                 Neurological Examination:  Cognitive: [    ] AAO x 3,   [x    ]  other: AOx2, not to year. Poor historian.                                                                  Attention:  [  x  ] intact,   [    ]  other:  able to follow 2 step commands and spell 'WORLD" backwards                          Memory: [    ] intact,    [ x   ]  other: 1/3 5 min. recall, 2/3 word recall with cues. Poor historian, poor insight.  Mood/Affect: [   x ] wnl,    [    ]  other:                                                                             Communication: [    ]Fluent, no dysarthria, following commands:  [   x ] other: slow, hesitant speech, fluent, comprehension and repetition intact, decreased word finding.  CN II - XII:  [   x ] intact,  [    ] other:                                                                                        Motor:   RIGHT UE: [   ] WNL,  [   x] other: Shoulder abduction, EF/EF 4/5 hand  and wrist extension 5/5  LEFT    UE: [x   ] WNL,  [   ] other:  RIGHT LE: [   ] WNL,  [ x  ] other: HF 4/5, DR 4/5  LEFT    LE: [ x  ] WNL,  [   ] other:    Tone: [x    ] wnl,   [    ]  other:  DTRs: [ x  ]symmetric, [   ] other:  Coordination:   [   x ] intact,   [ x   ] other: decreased proprioception on RLE                                                                          Sensory: [     ] Intact to light touch,   [  x  ] other: impaired kinesthetic awareness RUE, decreased proprioception right great toe > right hand      MEDICATIONS  (STANDING):  aspirin enteric coated 81 milliGRAM(s) Oral daily  atorvastatin 80 milliGRAM(s) Oral at bedtime  chlorhexidine 2% Cloths 1 Application(s) Topical <User Schedule>  clopidogrel Tablet 75 milliGRAM(s) Oral daily  donepezil 10 milliGRAM(s) Oral at bedtime  enoxaparin Injectable 40 milliGRAM(s) SubCutaneous every 24 hours  escitalopram 30 milliGRAM(s) Oral daily  folic acid 1 milliGRAM(s) Oral daily  lisinopril 5 milliGRAM(s) Oral daily  montelukast 10 milliGRAM(s) Oral daily  pantoprazole    Tablet 40 milliGRAM(s) Oral before breakfast  thiamine 100 milliGRAM(s) Oral daily    MEDICATIONS  (PRN):  acetaminophen     Tablet .. 650 milliGRAM(s) Oral every 6 hours PRN Temp greater or equal to 38C (100.4F), Mild Pain (1 - 3), Moderate Pain (4 - 6), Severe Pain (7 - 10)  loperamide 2 milliGRAM(s) Oral two times a day PRN Diarrhea  LORazepam     Tablet 1 milliGRAM(s) Oral every 2 hours PRN CIWA-Ar score increase by 2 points and a total score of 7 or less  melatonin 3 milliGRAM(s) Oral at bedtime PRN Insomnia        RECENT LABS/IMAGING                        14.5   6.19  )-----------( 250      ( 25 May 2024 04:30 )             43.9     05-25    135  |  105  |  31<H> (from 10)  ----------------------------<  86  4.3   |  14<L>  |  1.2    Ca    9.2      25 May 2024 04:30  Mg     2.3     05-25    TPro  7.1  /  Alb  4.4  /  TBili  0.3  /  DBili  x   /  AST  50<H>  /  ALT  46<H>  /  AlkPhos  77  05-25

## 2024-05-26 NOTE — PROGRESS NOTE ADULT - ASSESSMENT
ASSESSMENT/PLAN  89 y/o F with PMHx of CABG, dementia, asthma, alcohol use disorder, and recent CVA with right sided weakness (on 5/19/2024) that presented to the ED on 5/22 with generalized weakness s/p two falls at home.    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  - Trauma workup on admission unremarkable, no fractures  - EEG : Global focal slowing; no epileptic activity seen; non specific   -Echo pending - can be done on rehab per EPS and Neuro  -plan for ILR on rehab  - c/w ASA and plavix for 3 weeks (started 5/19; Plavix end date 6/10) and then ASA 81mg only  -c/w lipitor 80mg   -PT/OT/SLP eval and treat   -neuropsych consult    # CAD s/p CABG  # Hypertension:   - pt started on Lisinopril 5 mg   - Monitor BP adjust medications as necessary     #HTN - uncontrolled on admission  - continue Lisinopril and monitor and adjust    #Alcohol Use Disorder  - Patient reports that patient drinks approx 4-5 glasses of wine per day, has been drinking this way "for years"  - folic acid and thiamine supplementation    #diarrhea   - possible related to ETOH w/drawl  -loperamide prn  - monitor    #Long-term memory impairment with confabulation and poor insight c/w Wernicke's Encephalopathy  - c/w Donepezil 10 mg qhs  - thiamine  - B12 and TSH WNL    #Depression  - c/w Lexapro 30 mg qd    #Asthma  - c/w Montelukast 10 mg qd      -Pain control: Tylenol prn     -Skin:  No active issues at this time    - Diet: DASH/TLC           Precautions / PROPHYLAXIS:      - Falls      - DVT prophylaxis: lovenox    ASSESSMENT/PLAN  89 y/o F with PMHx of CABG, dementia, asthma, alcohol use disorder, and recent CVA with right sided weakness (on 5/19/2024) that presented to the ED on 5/22 with generalized weakness s/p two falls at home.    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  - Trauma workup on admission unremarkable, no fractures  - EEG : Global focal slowing; no epileptic activity seen; non specific   -Echo pending - can be done on rehab per EPS and Neuro  -plan for ILR on rehab  - c/w ASA and plavix for 3 weeks (started 5/19; Plavix end date 6/10) and then ASA 81mg only  -c/w lipitor 80mg   -PT/OT/SLP eval and treat   -neuropsych consult    # CAD s/p CABG  # Hypertension:   - pt started on Lisinopril 5 mg   - Monitor BP adjust medications as necessary     #HTN - uncontrolled on admission  - continue Lisinopril and monitor and adjust    #Alcohol Use Disorder  - Patient reports that patient drinks approx 4-5 glasses of wine per day, has been drinking this way "for years"  - folic acid and thiamine supplementation    #diarrhea   - possible related to ETOH w/drawl  -loperamide prn  -low lactose diet  - monitor    #Long-term memory impairment with confabulation and poor insight c/w Wernicke's Encephalopathy  - c/w Donepezil 10 mg qhs  - thiamine  - B12 and TSH WNL    #Depression  - c/w Lexapro 30 mg qd    #Asthma  - c/w Montelukast 10 mg qd      -Pain control: Tylenol prn     -Skin:  No active issues at this time    - Diet: DASH/TLC           Precautions / PROPHYLAXIS:      - Falls      - DVT prophylaxis: lovenox    ASSESSMENT/PLAN  89 y/o F with PMHx of CABG, dementia, asthma, alcohol use disorder, and recent CVA with right sided weakness (on 5/19/2024) that presented to the ED on 5/22 with generalized weakness s/p two falls at home.    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  - Trauma workup on admission unremarkable, no fractures  - EEG : Global focal slowing; no epileptic activity seen; non specific   -Echo pending - can be done on rehab per EPS and Neuro  -plan for ILR on rehab  - c/w ASA and plavix for 3 weeks (started 5/19; Plavix end date 6/10) and then ASA 81mg only  -c/w lipitor 80mg   -PT/OT/SLP eval and treat   -neuropsych consult    #Orthostasis   - Increasing BUN and skin tenting c/w hypovolemia  - son states that patient does not drink much  - will start IVF 5/26 and monitor    # CAD s/p CABG  # Hypertension:   - pt started on Lisinopril 5 mg   - Monitor BP adjust medications as necessary     #HTN - uncontrolled on admission  - continue Lisinopril and monitor and adjust    #Alcohol Use Disorder  - Patient reports that patient drinks approx 4-5 glasses of wine per day, has been drinking this way "for years"  - folic acid and thiamine supplementation    #diarrhea   - possible related to ETOH w/drawl  - spoke with son - this is a chronic condition at home (patient with poor memory - poor histoiran)  -loperamide prn  -low lactose diet  - monitor    #Long-term memory impairment with confabulation and poor insight c/w Wernicke's Encephalopathy  - c/w Donepezil 10 mg qhs  - thiamine  - B12 and TSH WNL    #Depression  - c/w Lexapro 30 mg qd    #Asthma  - c/w Montelukast 10 mg qd      -Pain control: Tylenol prn     -Skin:  No active issues at this time    - Diet: DASH/TLC           Precautions / PROPHYLAXIS:      - Falls      - DVT prophylaxis: lovenox

## 2024-05-26 NOTE — PROGRESS NOTE ADULT - ATTENDING COMMENTS
I reviewed the chart and examined the patient with the resident and we discussed the findings and treatment plan.  The patient is tolerating the rehab program well. I agree with the findings and treatment plan above, which I modified as indicated. The patient requires 3 hrs a day of acute inpatient rehab. No new complaints. Patient with chronic diarrhea per son. She had an episode c/w orthostasis this morning in PT. Skin tenting and rising BUN c/w with dehydration. Start IVF. Continue full rehab program.    I read, edited and agree with the Assessment.

## 2024-05-27 ENCOUNTER — RESULT REVIEW (OUTPATIENT)
Age: 89
End: 2024-05-27

## 2024-05-27 LAB
ALBUMIN SERPL ELPH-MCNC: 3.9 G/DL — SIGNIFICANT CHANGE UP (ref 3.5–5.2)
ALP SERPL-CCNC: 69 U/L — SIGNIFICANT CHANGE UP (ref 30–115)
ALT FLD-CCNC: 30 U/L — SIGNIFICANT CHANGE UP (ref 0–41)
ANION GAP SERPL CALC-SCNC: 14 MMOL/L — SIGNIFICANT CHANGE UP (ref 7–14)
AST SERPL-CCNC: 33 U/L — SIGNIFICANT CHANGE UP (ref 0–41)
BASOPHILS # BLD AUTO: 0.03 K/UL — SIGNIFICANT CHANGE UP (ref 0–0.2)
BASOPHILS NFR BLD AUTO: 0.6 % — SIGNIFICANT CHANGE UP (ref 0–1)
BILIRUB SERPL-MCNC: 0.6 MG/DL — SIGNIFICANT CHANGE UP (ref 0.2–1.2)
BUN SERPL-MCNC: 22 MG/DL — HIGH (ref 10–20)
CALCIUM SERPL-MCNC: 8.8 MG/DL — SIGNIFICANT CHANGE UP (ref 8.4–10.5)
CHLORIDE SERPL-SCNC: 109 MMOL/L — SIGNIFICANT CHANGE UP (ref 98–110)
CO2 SERPL-SCNC: 16 MMOL/L — LOW (ref 17–32)
CREAT SERPL-MCNC: 0.9 MG/DL — SIGNIFICANT CHANGE UP (ref 0.7–1.5)
EGFR: 62 ML/MIN/1.73M2 — SIGNIFICANT CHANGE UP
EOSINOPHIL # BLD AUTO: 0.07 K/UL — SIGNIFICANT CHANGE UP (ref 0–0.7)
EOSINOPHIL NFR BLD AUTO: 1.3 % — SIGNIFICANT CHANGE UP (ref 0–8)
GLUCOSE SERPL-MCNC: 91 MG/DL — SIGNIFICANT CHANGE UP (ref 70–99)
HCT VFR BLD CALC: 39.4 % — SIGNIFICANT CHANGE UP (ref 37–47)
HGB BLD-MCNC: 13.1 G/DL — SIGNIFICANT CHANGE UP (ref 12–16)
IMM GRANULOCYTES NFR BLD AUTO: 0.4 % — HIGH (ref 0.1–0.3)
LYMPHOCYTES # BLD AUTO: 1.86 K/UL — SIGNIFICANT CHANGE UP (ref 1.2–3.4)
LYMPHOCYTES # BLD AUTO: 35.2 % — SIGNIFICANT CHANGE UP (ref 20.5–51.1)
MAGNESIUM SERPL-MCNC: 2.1 MG/DL — SIGNIFICANT CHANGE UP (ref 1.8–2.4)
MCHC RBC-ENTMCNC: 30.8 PG — SIGNIFICANT CHANGE UP (ref 27–31)
MCHC RBC-ENTMCNC: 33.2 G/DL — SIGNIFICANT CHANGE UP (ref 32–37)
MCV RBC AUTO: 92.5 FL — SIGNIFICANT CHANGE UP (ref 81–99)
MONOCYTES # BLD AUTO: 0.63 K/UL — HIGH (ref 0.1–0.6)
MONOCYTES NFR BLD AUTO: 11.9 % — HIGH (ref 1.7–9.3)
NEUTROPHILS # BLD AUTO: 2.67 K/UL — SIGNIFICANT CHANGE UP (ref 1.4–6.5)
NEUTROPHILS NFR BLD AUTO: 50.6 % — SIGNIFICANT CHANGE UP (ref 42.2–75.2)
NRBC # BLD: 0 /100 WBCS — SIGNIFICANT CHANGE UP (ref 0–0)
PLATELET # BLD AUTO: 234 K/UL — SIGNIFICANT CHANGE UP (ref 130–400)
PMV BLD: 10.1 FL — SIGNIFICANT CHANGE UP (ref 7.4–10.4)
POTASSIUM SERPL-MCNC: 4.6 MMOL/L — SIGNIFICANT CHANGE UP (ref 3.5–5)
POTASSIUM SERPL-SCNC: 4.6 MMOL/L — SIGNIFICANT CHANGE UP (ref 3.5–5)
PROT SERPL-MCNC: 6.3 G/DL — SIGNIFICANT CHANGE UP (ref 6–8)
RBC # BLD: 4.26 M/UL — SIGNIFICANT CHANGE UP (ref 4.2–5.4)
RBC # FLD: 14 % — SIGNIFICANT CHANGE UP (ref 11.5–14.5)
SODIUM SERPL-SCNC: 139 MMOL/L — SIGNIFICANT CHANGE UP (ref 135–146)
WBC # BLD: 5.28 K/UL — SIGNIFICANT CHANGE UP (ref 4.8–10.8)
WBC # FLD AUTO: 5.28 K/UL — SIGNIFICANT CHANGE UP (ref 4.8–10.8)

## 2024-05-27 PROCEDURE — 93306 TTE W/DOPPLER COMPLETE: CPT | Mod: 26

## 2024-05-27 RX ADMIN — ESCITALOPRAM OXALATE 30 MILLIGRAM(S): 10 TABLET, FILM COATED ORAL at 13:28

## 2024-05-27 RX ADMIN — Medication 100 MILLIGRAM(S): at 13:28

## 2024-05-27 RX ADMIN — DONEPEZIL HYDROCHLORIDE 10 MILLIGRAM(S): 10 TABLET, FILM COATED ORAL at 21:30

## 2024-05-27 RX ADMIN — PANTOPRAZOLE SODIUM 40 MILLIGRAM(S): 20 TABLET, DELAYED RELEASE ORAL at 06:40

## 2024-05-27 RX ADMIN — Medication 81 MILLIGRAM(S): at 13:28

## 2024-05-27 RX ADMIN — LISINOPRIL 5 MILLIGRAM(S): 2.5 TABLET ORAL at 06:40

## 2024-05-27 RX ADMIN — CHLORHEXIDINE GLUCONATE 1 APPLICATION(S): 213 SOLUTION TOPICAL at 06:40

## 2024-05-27 RX ADMIN — ENOXAPARIN SODIUM 40 MILLIGRAM(S): 100 INJECTION SUBCUTANEOUS at 06:40

## 2024-05-27 RX ADMIN — CLOPIDOGREL BISULFATE 75 MILLIGRAM(S): 75 TABLET, FILM COATED ORAL at 13:28

## 2024-05-27 RX ADMIN — MONTELUKAST 10 MILLIGRAM(S): 4 TABLET, CHEWABLE ORAL at 13:28

## 2024-05-27 RX ADMIN — ATORVASTATIN CALCIUM 80 MILLIGRAM(S): 80 TABLET, FILM COATED ORAL at 21:30

## 2024-05-27 RX ADMIN — Medication 1 MILLIGRAM(S): at 13:28

## 2024-05-27 NOTE — PROGRESS NOTE ADULT - SUBJECTIVE AND OBJECTIVE BOX
Patient is a 88y old  Female who presents with a chief complaint of Stroke with right hemiparesis (dominant) (24 May 2024 10:14)      HPI:  88y Female with PMHx of CAD status post CABG, alcohol use disorder, asthma presented to ED initially on 5/19  with complaints of sudden onset right sided weakness beginning last night at 10pm. pt woke up with generalized weakness that was nonlocalized which caused her to feel weak while standing. Pt endorses fall last week in which she hit her head slightly. Stroke code called in ED. /91 in triage. CTH negative, CTP negative, CTA head and neck showing mild stenosis. MRI showed acute infarct of the left corona radiata/putamen.  Pt was started on DAPT and statin, and pt was discharged 5/21 per pt/family wishes with outpatient follow up for TTE and ILR.     On 5/22 pt presented to ED with generalized weakness s/p two falls at home. Son stated that [facility] was at the patient's home evaluating her for home PT/OT when they noticed that patient was unsteady on her feet, and while speaking to son on the phone, Patient fell from standing position onto her knees onto a carpeted floor, no head injury or LOC. Decision was made to call EMS; when EMS arrived and was beginning transport into [facility], patient once again fell from standing position onto her knees, no head injury or LOC. BP in ED was  193/76 and given IV labetalol. Trauma workup unremarkable.  EPS saw patient, plan  for ILR and TTE, which they said can be done on the rehab unit. Once medically stabilized patient considered a good candidate for acute rehab.     PMR team evaluated the patient and deemed her appropriate for acute rehab 2/2 decline in function with multiple comorbidities and ability to tolerate and benefit for 3 hours of therapy daily at least 5 days a week.   PLOF: patient independent with ADLs and no AD for ambulation.   Admission LOF: Matt for bed mobility and transfers, ambulates 50ft with RW CGA, Matt fo rUBD, modA for LBD, dependent for toileting  LS: Pt lives alone in private house with 1 step to enter. Has an attic with 10 steps to enter. Of note, the patient admits to drinking 4-5 glasses of wine daily, and reportedly was drinking at home after her discharge from the hospital. At this point, she does not remember being home after her stroke admission.    (24 May 2024 10:14)    TODAY'S SUBJECTIVE & REVIEW OF SYMPTOMS:  patient examined. Vitals, labs and meds reviewed. No new complaints. Still having loose BMs (chronic per family). S/p IVF yesterday for orthostasis assoc. with hypovolemia. Appears euvolemic today with no orthostatic symptoms. Participating well in therapies.       Constitutional:    [ x  ] WNL           [   ] poor appetite   [   ] insomnia   [   ] tired   Cardio:                [x   ] WNL           [   ] CP   [   ] MISTRY   [   ] palpitations    [   ] weak after standing in PT           Resp:                   [ x  ] WNL           [   ] SOB   [   ] cough   [   ] wheezing   GI:                        [   ] WNL           [   ] constipation   [   x] diarrhea (family reports chronic) [   ] abdominal pain   [   ] nausea   [   ] emesis                                :                      [  x ] WNL           [   ] URIAS  [   ] dysuria   [   ] difficulty voiding             Endo:                   [  x ] WNL          [   ] polyuria   [   ] temperature intolerance                 Skin:                     [  x ] WNL          [   ] pain   [   ] wound   [   ] rash   MSK:                    [ x  ] WNL          [   ] muscle pain   [   ] joint pain/ stiffness   [   ] muscle tenderness   [   ] swelling   Neuro:                 [   ] WNL          [   ] HA   [   ] change in vision   [   ] tremor   [x   ] weakness   [   ]dysphagia              Cognitive:           [   ] WNL           [  x ]confusion    (patient is confused and a poor historian, though not aware of her deficits)   Psych:                  [ x  ] WNL           [   ] hallucinations   [   ]agitation   [   ] delusion   [   ]depression        PHYSICAL EXAM    Vital Signs Last 24 Hrs  T(C): 36.7 (27 May 2024 06:42), Max: 36.8 (26 May 2024 14:26)  T(F): 98 (27 May 2024 06:42), Max: 98.2 (26 May 2024 14:26)  HR: 59 (27 May 2024 06:42) (57 - 59)  BP: 133/77 (27 May 2024 06:42) (120/66 - 133/77)  BP(mean): 96 (27 May 2024 06:42) (84 - 96)  RR: 20 (27 May 2024 06:42) (20 - 20)  SpO2: --      General:[  x ] NAD, Resting Comfortable,   [   ] other:                                HEENT: [x   ] NC/AT, EOMI, PERRL , Normal Conjunctivae,   [   ] other:  Cardio: [x   ] RRR, no murmer,   [   ] other:                              Pulm: [  x ] No Respiratory Distress,  Lungs CTAB,   [   ] other:                       Abdomen: [  x ]ND/NT, Soft,   [   ] other:    : [  x ] NO URIAS CATHETER, [   ] URIAS CATHETER- no meatal tear, no discharge, [   ] other:                                            MSK: [  x ] No joint swelling, Full ROM,   [   ] other:                                         Ext: [ x  ]No C/C/E, No calf tenderness,   [   ]other:    Skin: [ x  ]intact,   [   ] other:  significant tenting of skin back of hand                                                                 Neurological Examination:  Cognitive: [    ] AAO x 3,   [x    ]  other: AOx2, not to year. Poor historian.                                                                  Attention:  [  x  ] intact,   [    ]  other:  able to follow 2 step commands and spell 'WORLD" backwards                          Memory: [    ] intact,    [ x   ]  other: 1/3 5 min. recall, 2/3 word recall with cues. Poor historian, poor insight. Confabulates.  Mood/Affect: [   x ] wnl,    [    ]  other:                                                                             Communication: [    ]Fluent, no dysarthria, following commands:  [   x ] other: slow, hesitant speech, fluent, comprehension and repetition intact, decreased word finding.  CN II - XII:  [   x ] intact,  [    ] other:                                                                                        Motor:   RIGHT UE: [   ] WNL,  [   x] other: Shoulder abduction, EF/EF 4/5 hand  and wrist extension 5/5  LEFT    UE: [x   ] WNL,  [   ] other:  RIGHT LE: [   ] WNL,  [ x  ] other: HF 4/5, DR 4/5  LEFT    LE: [ x  ] WNL,  [   ] other:    Tone: [x    ] wnl,   [    ]  other:  DTRs: [ x  ]symmetric, [   ] other:  Coordination:   [   x ] intact,   [ x   ] other: decreased proprioception on RLE                                                                          Sensory: [     ] Intact to light touch,   [  x  ] other: impaired kinesthetic awareness RUE, decreased proprioception right great toe > right hand      MEDICATIONS  (STANDING):  aspirin enteric coated 81 milliGRAM(s) Oral daily  atorvastatin 80 milliGRAM(s) Oral at bedtime  chlorhexidine 2% Cloths 1 Application(s) Topical <User Schedule>  clopidogrel Tablet 75 milliGRAM(s) Oral daily  donepezil 10 milliGRAM(s) Oral at bedtime  enoxaparin Injectable 40 milliGRAM(s) SubCutaneous every 24 hours  escitalopram 30 milliGRAM(s) Oral daily  folic acid 1 milliGRAM(s) Oral daily  lisinopril 5 milliGRAM(s) Oral daily  montelukast 10 milliGRAM(s) Oral daily  pantoprazole    Tablet 40 milliGRAM(s) Oral before breakfast  sodium chloride 0.9%. 1000 milliLiter(s) (75 mL/Hr) IV Continuous <Continuous>  thiamine 100 milliGRAM(s) Oral daily    MEDICATIONS  (PRN):  acetaminophen     Tablet .. 650 milliGRAM(s) Oral every 6 hours PRN Temp greater or equal to 38C (100.4F), Mild Pain (1 - 3), Moderate Pain (4 - 6), Severe Pain (7 - 10)  loperamide 2 milliGRAM(s) Oral two times a day PRN Diarrhea  LORazepam     Tablet 1 milliGRAM(s) Oral every 2 hours PRN CIWA-Ar score increase by 2 points and a total score of 7 or less  melatonin 3 milliGRAM(s) Oral at bedtime PRN Insomnia          RECENT LABS/IMAGING                          13.1   5.28  )-----------( 234      ( 27 May 2024 05:33 )             39.4     05-27    139  |  109  |  22<H>  ----------------------------<  91  4.6   |  16<L>  |  0.9    Ca    8.8      27 May 2024 05:33  Mg     2.1     05-27    TPro  6.3  /  Alb  3.9  /  TBili  0.6  /  DBili  x   /  AST  33  /  ALT  30  /  AlkPhos  69  05-27

## 2024-05-27 NOTE — CHART NOTE - NSCHARTNOTEFT_GEN_A_CORE
PT HAD ILR DONE ON 5/24  while in rehab  BY DR CHILDRESS , BUT NO DEVICE OR BOX WITH PT . CALLED [phone] SPOKE WITH Tello , some one will call us at [phone] tomorrow . She is going to echo lab for TTE with bubble study  today .

## 2024-05-27 NOTE — PROGRESS NOTE ADULT - ASSESSMENT
ASSESSMENT/PLAN  87 y/o F with PMHx of CABG, dementia, asthma, alcohol use disorder, and recent CVA with right sided weakness (on 5/19/2024) that presented to the ED on 5/22 with generalized weakness s/p two falls at home.    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  - Trauma workup on admission unremarkable, no fractures  - EEG : Global focal slowing; no epileptic activity seen; non specific   -Echo pending - can be done on rehab per EPS and Neuro  -plan for ILR on rehab - will recall EPS  - c/w ASA and plavix for 3 weeks (started 5/19; Plavix end date 6/10) and then ASA 81mg only  -c/w lipitor 80mg   -PT/OT/SLP eval and treat   -neuropsych consult    #Orthostasis   - Increasing BUN and skin tenting c/w hypovolemia 5/26  - son states that patient does not drink much  - given IVF 5/26 and asymptomatic 5/27. Continue IVF one more day  - encourage PO fluids and monitor    # CAD s/p CABG  # Hypertension: uncontrolled on admission  - pt started on Lisinopril 5 mg   - Monitor BP adjust medications as necessary   - BP in good range    #Alcohol Use Disorder  - Patient reports that patient drinks approx 4-5 glasses of wine per day, has been drinking this way "for years"  - folic acid and thiamine supplementation    #diarrhea   - possible related to ETOH w/drawl  - spoke with son - this is a chronic condition at home (patient with poor memory - poor histoiran)  -loperamide prn  -low lactose diet  - monitor  - can f/u as an outpatient    #Long-term memory impairment with confabulation and poor insight c/w Wernicke's Encephalopathy  - c/w Donepezil 10 mg qhs  - continue thiamine  - B12 and TSH WNL    #Depression  - c/w Lexapro 30 mg qd    #Asthma  - c/w Montelukast 10 mg qd      -Pain control: Tylenol prn     -Skin:  No active issues at this time    Diet, DASH/TLC:   Sodium & Cholesterol Restricted  Lactose Restricted (Milk Sugar Intoler.) (05-26-24 @ 10:32) [Active]           Precautions / PROPHYLAXIS:      - Falls      - DVT prophylaxis: lovenox

## 2024-05-28 DIAGNOSIS — I25.10 ATHEROSCLEROTIC HEART DISEASE OF NATIVE CORONARY ARTERY WITHOUT ANGINA PECTORIS: ICD-10-CM

## 2024-05-28 DIAGNOSIS — R29.700 NIHSS SCORE 0: ICD-10-CM

## 2024-05-28 DIAGNOSIS — Z95.1 PRESENCE OF AORTOCORONARY BYPASS GRAFT: ICD-10-CM

## 2024-05-28 DIAGNOSIS — M48.061 SPINAL STENOSIS, LUMBAR REGION WITHOUT NEUROGENIC CLAUDICATION: ICD-10-CM

## 2024-05-28 DIAGNOSIS — R29.702 NIHSS SCORE 2: ICD-10-CM

## 2024-05-28 DIAGNOSIS — I63.512 CEREBRAL INFARCTION DUE TO UNSPECIFIED OCCLUSION OR STENOSIS OF LEFT MIDDLE CEREBRAL ARTERY: ICD-10-CM

## 2024-05-28 DIAGNOSIS — I63.531 CEREBRAL INFARCTION DUE TO UNSPECIFIED OCCLUSION OR STENOSIS OF RIGHT POSTERIOR CEREBRAL ARTERY: ICD-10-CM

## 2024-05-28 DIAGNOSIS — E78.5 HYPERLIPIDEMIA, UNSPECIFIED: ICD-10-CM

## 2024-05-28 DIAGNOSIS — G81.91 HEMIPLEGIA, UNSPECIFIED AFFECTING RIGHT DOMINANT SIDE: ICD-10-CM

## 2024-05-28 DIAGNOSIS — I10 ESSENTIAL (PRIMARY) HYPERTENSION: ICD-10-CM

## 2024-05-28 DIAGNOSIS — I63.521 CEREBRAL INFARCTION DUE TO UNSPECIFIED OCCLUSION OR STENOSIS OF RIGHT ANTERIOR CEREBRAL ARTERY: ICD-10-CM

## 2024-05-28 RX ORDER — LISINOPRIL 2.5 MG/1
5 TABLET ORAL DAILY
Refills: 0 | Status: DISCONTINUED | OUTPATIENT
Start: 2024-05-29 | End: 2024-05-29

## 2024-05-28 RX ADMIN — Medication 100 MILLIGRAM(S): at 12:05

## 2024-05-28 RX ADMIN — ENOXAPARIN SODIUM 40 MILLIGRAM(S): 100 INJECTION SUBCUTANEOUS at 06:23

## 2024-05-28 RX ADMIN — CHLORHEXIDINE GLUCONATE 1 APPLICATION(S): 213 SOLUTION TOPICAL at 06:23

## 2024-05-28 RX ADMIN — Medication 1 MILLIGRAM(S): at 12:05

## 2024-05-28 RX ADMIN — DONEPEZIL HYDROCHLORIDE 10 MILLIGRAM(S): 10 TABLET, FILM COATED ORAL at 22:23

## 2024-05-28 RX ADMIN — Medication 81 MILLIGRAM(S): at 12:05

## 2024-05-28 RX ADMIN — CLOPIDOGREL BISULFATE 75 MILLIGRAM(S): 75 TABLET, FILM COATED ORAL at 12:10

## 2024-05-28 RX ADMIN — ESCITALOPRAM OXALATE 30 MILLIGRAM(S): 10 TABLET, FILM COATED ORAL at 12:10

## 2024-05-28 RX ADMIN — ATORVASTATIN CALCIUM 80 MILLIGRAM(S): 80 TABLET, FILM COATED ORAL at 22:23

## 2024-05-28 RX ADMIN — PANTOPRAZOLE SODIUM 40 MILLIGRAM(S): 20 TABLET, DELAYED RELEASE ORAL at 06:22

## 2024-05-28 RX ADMIN — MONTELUKAST 10 MILLIGRAM(S): 4 TABLET, CHEWABLE ORAL at 12:05

## 2024-05-28 RX ADMIN — LISINOPRIL 5 MILLIGRAM(S): 2.5 TABLET ORAL at 06:22

## 2024-05-28 NOTE — PROGRESS NOTE ADULT - ASSESSMENT
Neuropsychology Follow Up           Treatment focused on: Follow-up evaluation of cognitive functioning     Pain: No  Location: N/A  Ratin/10  Intervention: N/A     Orientation: Grossly oriented, incorrect for year (said  when it was ) and day (said Monday, when it was Tuesday).     Arousal Level: Fatigued     Behavior: Cooperative, friendly     Affect/Mood Range: Euthymic and broad      Needed: No   #: N/A     Attention: Fair     Insight into illness/deficits: Fair          Description of Current Injury(s):     88-year-old right-hand dominant female admitted for rehab of right hemiparesis (dominant) due to 2 falls at home (no head strike or loss of consciousness) and recent left basal ganglia ischemic stroke.            Other Noted Medical History:     Coronary artery bypass grafting (CABG) in , dementia, asthma, depression, and alcohol use disorder (approximately 4-5 glasses of wine per day, for many years).            Reason(s) for Visit:     Follow-up neuropsychological testing was completed to assess language fluency and visuo-spatial abilities.          Measure(s)/Intervention(s) Given:     COWAT FAS/Animals; Brush Visual Organization Test (HVOT).                Clinical Summary:       Patient demonstrated weak semantic fluency compared to low average phonemic fluency, poor visual organization and visuospatial mental manipulation, and poor-self monitoring resulting in frequent repetitions and intrusions. Loss of instructional set may be related to poor working memory or rapid forgetting. Performance may have been affected by possible sub-optimal effort and fatigue. Patient required frequent repetition of instructions and prompts/reminders, which she was receptive to.  Results are largely consistent with difficulties observed from previous testing in attention, working memory, and visuospatial reasoning/mental manipulation. While expressive language was normal in conversation, impaired semantic (relative to phonemic) fluency is indicative of temporal lobe dysfunction and suggestive of possible AD          Patient’s cognitive profile is suggestive of temporal lobe/semantic impairment, with some frontal dysfunction. Patient’s history of alcohol abuse and unspecified vascular disease likely more responsible for current assessment results than effect of most recent CVA.  Patient’s recent left basal ganglia CVA likely contributed more to recent falls and right sided motor weaknesses.  Overall, patient continues to meet criteria for major neurocognitive disorder at this time due to impairment in 2+ cognitive domains and reduction in ADLs.          Recommendations:       Given patient’s memory difficulties, patient would benefit from reminders (verbal and written), written instructions, repetition, checks for understanding, prompting, refocusing, and redirection.  Environmental distractions should be reduced as much as possible to ensure focus on tasks.  Frequent breaks may be helpful when dealing with fatigue.  Continued assessment of visuo-spatial and grapho-motor abilities.  Patient may benefit from cognitive remediation in the areas of visual organization and mental manipulation.  Provide information surrounding healthy lifestyle changes such as diet, sleep hygiene, and alcohol use cessation.  Patient’s recent cardiovascular injuries, right hemiparesis, history of alcohol use, and other significant medical history place patient at a higher risk for falls and/or future cardiovascular events.  As a result, ongoing monitoring of symptoms is warranted and supervision in all activities of daily living is strongly recommended.            Goals: Monitor cognition, support cognitive recovery and adjustment            Plan: Cognitive remediation; psychoeducation; provide feedback prior to discharge; 1-2 sessions weekly at 30-60 minutes each.

## 2024-05-28 NOTE — PROGRESS NOTE ADULT - ATTENDING COMMENTS
Patient seen and examined with the resident. We discussed the case. I have directed the care. I edited the note. The patient requires acute rehab with 3 hours of daily therapies at least 5 out of 7 days and close physiatry follow up.  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  - Trauma workup on admission unremarkable, no fractures  - EEG : Global focal slowing; no epileptic activity seen; non specific   -Echo pending - can be done on rehab per EPS and Neuro  -s/p ILR 5/24   - c/w ASA and plavix for 3 weeks (started 5/19; Plavix end date 6/10) and then ASA 81mg only  -c/w lipitor 80mg   -PT/OT/SLP eval and treat   -neuropsych consult    #Orthostasis - improving   - Increasing BUN and skin tenting c/w hypovolemia 5/26  - son states that patient does not drink much  - given IVF 5/26 and asymptomatic 5/27.   - encourage PO fluids and monitor    # CAD s/p CABG  # Hypertension: uncontrolled on admission  - pt started on Lisinopril 5 mg   - Monitor BP adjust medications as necessary   - BP in good range    #Alcohol Use Disorder  - Patient reports that patient drinks approx 4-5 glasses of wine per day, has been drinking this way "for years"  - folic acid and thiamine supplementation    #diarrhea - improved  - possible related to ETOH w/drawl  - spoke with son - this is a chronic condition at home (patient with poor memory - poor histoiran)  -loperamide prn  -low lactose diet  - monitor  - can f/u as an outpatient    #Long-term memory impairment with confabulation and poor insight c/w Wernicke's Encephalopathy  - c/w Donepezil 10 mg qhs  - continue thiamine  - B12 and TSH WNL    #Depression  - c/w Lexapro 30 mg qd    #Asthma  - c/w Montelukast 10 mg qd      -Pain control: Tylenol prn     -Skin:  No active issues at this time    Diet, DASH/TLC:   Sodium & Cholesterol Restricted  Lactose Restricted (Milk Sugar Intoler.) (05-26-24 @ 10:32) [Active]

## 2024-05-28 NOTE — PROGRESS NOTE ADULT - ASSESSMENT
ASSESSMENT/PLAN  89 y/o F with PMHx of CABG, dementia, asthma, alcohol use disorder, and recent CVA with right sided weakness (on 5/19/2024) that presented to the ED on 5/22 with generalized weakness s/p two falls at home.    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  - Trauma workup on admission unremarkable, no fractures  - EEG : Global focal slowing; no epileptic activity seen; non specific   -Echo pending - can be done on rehab per EPS and Neuro  -s/p ILR 5/24   - c/w ASA and plavix for 3 weeks (started 5/19; Plavix end date 6/10) and then ASA 81mg only  -c/w lipitor 80mg   -PT/OT/SLP eval and treat   -neuropsych consult    #Orthostasis - improving   - Increasing BUN and skin tenting c/w hypovolemia 5/26  - son states that patient does not drink much  - given IVF 5/26 and asymptomatic 5/27.   - encourage PO fluids and monitor    # CAD s/p CABG  # Hypertension: uncontrolled on admission  - pt started on Lisinopril 5 mg   - Monitor BP adjust medications as necessary   - BP in good range    #Alcohol Use Disorder  - Patient reports that patient drinks approx 4-5 glasses of wine per day, has been drinking this way "for years"  - folic acid and thiamine supplementation    #diarrhea - improved  - possible related to ETOH w/drawl  - spoke with son - this is a chronic condition at home (patient with poor memory - poor histoiran)  -loperamide prn  -low lactose diet  - monitor  - can f/u as an outpatient    #Long-term memory impairment with confabulation and poor insight c/w Wernicke's Encephalopathy  - c/w Donepezil 10 mg qhs  - continue thiamine  - B12 and TSH WNL    #Depression  - c/w Lexapro 30 mg qd    #Asthma  - c/w Montelukast 10 mg qd      -Pain control: Tylenol prn     -Skin:  No active issues at this time    Diet, DASH/TLC:   Sodium & Cholesterol Restricted  Lactose Restricted (Milk Sugar Intoler.) (05-26-24 @ 10:32) [Active]           Precautions / PROPHYLAXIS:      - Falls      - DVT prophylaxis: lovenox
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
## 2024-05-28 NOTE — PROGRESS NOTE ADULT - SUBJECTIVE AND OBJECTIVE BOX
Patient is a 88y old  Female who presents with a chief complaint of Stroke with right hemiparesis (dominant) (24 May 2024 10:14)      HPI:  88y Female with PMHx of CAD status post CABG, alcohol use disorder, asthma presented to ED initially on 5/19  with complaints of sudden onset right sided weakness beginning last night at 10pm. pt woke up with generalized weakness that was nonlocalized which caused her to feel weak while standing. Pt endorses fall last week in which she hit her head slightly. Stroke code called in ED. /91 in triage. CTH negative, CTP negative, CTA head and neck showing mild stenosis. MRI showed acute infarct of the left corona radiata/putamen.  Pt was started on DAPT and statin, and pt was discharged 5/21 per pt/family wishes with outpatient follow up for TTE and ILR.     On 5/22 pt presented to ED with generalized weakness s/p two falls at home. Son stated that [facility] was at the patient's home evaluating her for home PT/OT when they noticed that patient was unsteady on her feet, and while speaking to son on the phone, Patient fell from standing position onto her knees onto a carpeted floor, no head injury or LOC. Decision was made to call EMS; when EMS arrived and was beginning transport into [facility], patient once again fell from standing position onto her knees, no head injury or LOC. BP in ED was  193/76 and given IV labetalol. Trauma workup unremarkable.  EPS saw patient, plan  for ILR and TTE, which they said can be done on the rehab unit. Once medically stabilized patient considered a good candidate for acute rehab.     PMR team evaluated the patient and deemed her appropriate for acute rehab 2/2 decline in function with multiple comorbidities and ability to tolerate and benefit for 3 hours of therapy daily at least 5 days a week.   PLOF: patient independent with ADLs and no AD for ambulation.   Admission LOF: Matt for bed mobility and transfers, ambulates 50ft with RW CGA, Matt fo rUBD, modA for LBD, dependent for toileting  LS: Pt lives alone in private house with 1 step to enter. Has an attic with 10 steps to enter. Of note, the patient admits to drinking 4-5 glasses of wine daily, and reportedly was drinking at home after her discharge from the hospital. At this point, she does not remember being home after her stroke admission.    (24 May 2024 10:14)    TODAY'S SUBJECTIVE & REVIEW OF SYMPTOMS:  Patient seen and examined at bedside this AM. Vitals reviewed. No complaints. No overnight events. Passing bowel movements. Participating well in therapies.       Constitutional:    [ x  ] WNL           [   ] poor appetite   [   ] insomnia   [   ] tired   Cardio:                [x   ] WNL           [   ] CP   [   ] MISTRY   [   ] palpitations    [   ] weak after standing in PT           Resp:                   [ x  ] WNL           [   ] SOB   [   ] cough   [   ] wheezing   GI:                        [   ] WNL           [   ] constipation   [   x] diarrhea (family reports chronic) [   ] abdominal pain   [   ] nausea   [   ] emesis                                :                      [  x ] WNL           [   ] URIAS  [   ] dysuria   [   ] difficulty voiding             Endo:                   [  x ] WNL          [   ] polyuria   [   ] temperature intolerance                 Skin:                     [  x ] WNL          [   ] pain   [   ] wound   [   ] rash   MSK:                    [ x  ] WNL          [   ] muscle pain   [   ] joint pain/ stiffness   [   ] muscle tenderness   [   ] swelling   Neuro:                 [   ] WNL          [   ] HA   [   ] change in vision   [   ] tremor   [x   ] weakness   [   ]dysphagia              Cognitive:           [   ] WNL           [  x ]confusion    (patient is confused and a poor historian, though not aware of her deficits)   Psych:                  [ x  ] WNL           [   ] hallucinations   [   ]agitation   [   ] delusion   [   ]depression        PHYSICAL EXAM  Vital Signs Last 24 Hrs  T(C): 36.6 (28 May 2024 04:45), Max: 36.8 (27 May 2024 13:51)  T(F): 97.9 (28 May 2024 04:45), Max: 98.3 (27 May 2024 13:51)  HR: 52 (28 May 2024 04:45) (52 - 79)  BP: 150/98 (28 May 2024 04:45) (110/54 - 176/76)  BP(mean): 109 (27 May 2024 20:00) (109 - 109)  RR: 18 (28 May 2024 04:45) (18 - 18)  SpO2: --        General:[  x ] NAD, Resting Comfortable,   [   ] other:                                HEENT: [x   ] NC/AT, EOMI, PERRL , Normal Conjunctivae,   [   ] other:  Cardio: [x   ] RRR, no murmer,   [   ] other:                              Pulm: [  x ] No Respiratory Distress,  Lungs CTAB,   [   ] other:                       Abdomen: [  x ]ND/NT, Soft,   [   ] other:    : [  x ] NO URIAS CATHETER, [   ] URIAS CATHETER- no meatal tear, no discharge, [   ] other:                                            MSK: [  x ] No joint swelling, Full ROM,   [   ] other:                                         Ext: [ x  ]No C/C/E, No calf tenderness,   [   ]other:    Skin: [ x  ]intact,   [   ] other:  significant tenting of skin back of hand                                                                 Neurological Examination:  Cognitive: [    ] AAO x 3,   [x    ]  other: AOx2, not to year. Poor historian.                                                                  Attention:  [  x  ] intact,   [    ]  other:  able to follow 2 step commands and spell 'WORLD" backwards                          Memory: [    ] intact,    [ x   ]  other: 1/3 5 min. recall, 2/3 word recall with cues. Poor historian, poor insight. Confabulates.  Mood/Affect: [   x ] wnl,    [    ]  other:                                                                             Communication: [    ]Fluent, no dysarthria, following commands:  [   x ] other: slow, hesitant speech, fluent, comprehension and repetition intact, decreased word finding.  CN II - XII:  [   x ] intact,  [    ] other:                                                                                        Motor:   RIGHT UE: [   ] WNL,  [   x] other: Shoulder abduction, EF/EF 4/5 hand  and wrist extension 5/5  LEFT    UE: [x   ] WNL,  [   ] other:  RIGHT LE: [   ] WNL,  [ x  ] other: HF 4/5, DR 4/5  LEFT    LE: [ x  ] WNL,  [   ] other:    Tone: [x    ] wnl,   [    ]  other:  DTRs: [ x  ]symmetric, [   ] other:  Coordination:   [   x ] intact,   [ x   ] other: decreased proprioception on RLE                                                                          Sensory: [     ] Intact to light touch,   [  x  ] other: impaired kinesthetic awareness RUE, decreased proprioception right great toe > right hand      MEDICATIONS  (STANDING):  aspirin enteric coated 81 milliGRAM(s) Oral daily  atorvastatin 80 milliGRAM(s) Oral at bedtime  chlorhexidine 2% Cloths 1 Application(s) Topical <User Schedule>  clopidogrel Tablet 75 milliGRAM(s) Oral daily  donepezil 10 milliGRAM(s) Oral at bedtime  enoxaparin Injectable 40 milliGRAM(s) SubCutaneous every 24 hours  escitalopram 30 milliGRAM(s) Oral daily  folic acid 1 milliGRAM(s) Oral daily  lisinopril 5 milliGRAM(s) Oral daily  montelukast 10 milliGRAM(s) Oral daily  pantoprazole    Tablet 40 milliGRAM(s) Oral before breakfast  sodium chloride 0.9%. 1000 milliLiter(s) (75 mL/Hr) IV Continuous <Continuous>  thiamine 100 milliGRAM(s) Oral daily    MEDICATIONS  (PRN):  acetaminophen     Tablet .. 650 milliGRAM(s) Oral every 6 hours PRN Temp greater or equal to 38C (100.4F), Mild Pain (1 - 3), Moderate Pain (4 - 6), Severe Pain (7 - 10)  loperamide 2 milliGRAM(s) Oral two times a day PRN Diarrhea  LORazepam     Tablet 1 milliGRAM(s) Oral every 2 hours PRN CIWA-Ar score increase by 2 points and a total score of 7 or less  melatonin 3 milliGRAM(s) Oral at bedtime PRN Insomnia          RECENT LABS/IMAGING                          13.1   5.28  )-----------( 234      ( 27 May 2024 05:33 )             39.4     05-27    139  |  109  |  22<H>  ----------------------------<  91  4.6   |  16<L>  |  0.9    Ca    8.8      27 May 2024 05:33  Mg     2.1     05-27    TPro  6.3  /  Alb  3.9  /  TBili  0.6  /  DBili  x   /  AST  33  /  ALT  30  /  AlkPhos  69  05-27

## 2024-05-29 RX ORDER — METOPROLOL TARTRATE 50 MG
25 TABLET ORAL DAILY
Refills: 0 | Status: DISCONTINUED | OUTPATIENT
Start: 2024-05-29 | End: 2024-05-30

## 2024-05-29 RX ORDER — LISINOPRIL 2.5 MG/1
10 TABLET ORAL AT BEDTIME
Refills: 0 | Status: DISCONTINUED | OUTPATIENT
Start: 2024-05-30 | End: 2024-06-03

## 2024-05-29 RX ORDER — LISINOPRIL 2.5 MG/1
5 TABLET ORAL AT BEDTIME
Refills: 0 | Status: COMPLETED | OUTPATIENT
Start: 2024-05-29 | End: 2024-05-29

## 2024-05-29 RX ADMIN — LISINOPRIL 5 MILLIGRAM(S): 2.5 TABLET ORAL at 06:19

## 2024-05-29 RX ADMIN — MONTELUKAST 10 MILLIGRAM(S): 4 TABLET, CHEWABLE ORAL at 12:08

## 2024-05-29 RX ADMIN — Medication 81 MILLIGRAM(S): at 12:09

## 2024-05-29 RX ADMIN — ENOXAPARIN SODIUM 40 MILLIGRAM(S): 100 INJECTION SUBCUTANEOUS at 06:20

## 2024-05-29 RX ADMIN — Medication 100 MILLIGRAM(S): at 12:09

## 2024-05-29 RX ADMIN — ESCITALOPRAM OXALATE 30 MILLIGRAM(S): 10 TABLET, FILM COATED ORAL at 12:09

## 2024-05-29 RX ADMIN — CHLORHEXIDINE GLUCONATE 1 APPLICATION(S): 213 SOLUTION TOPICAL at 06:19

## 2024-05-29 RX ADMIN — ATORVASTATIN CALCIUM 80 MILLIGRAM(S): 80 TABLET, FILM COATED ORAL at 22:15

## 2024-05-29 RX ADMIN — Medication 1 MILLIGRAM(S): at 12:09

## 2024-05-29 RX ADMIN — DONEPEZIL HYDROCHLORIDE 10 MILLIGRAM(S): 10 TABLET, FILM COATED ORAL at 22:15

## 2024-05-29 RX ADMIN — PANTOPRAZOLE SODIUM 40 MILLIGRAM(S): 20 TABLET, DELAYED RELEASE ORAL at 06:19

## 2024-05-29 RX ADMIN — LISINOPRIL 5 MILLIGRAM(S): 2.5 TABLET ORAL at 22:15

## 2024-05-29 RX ADMIN — CLOPIDOGREL BISULFATE 75 MILLIGRAM(S): 75 TABLET, FILM COATED ORAL at 12:10

## 2024-05-29 NOTE — PROGRESS NOTE ADULT - ASSESSMENT
ASSESSMENT/PLAN  87 y/o F with PMHx of CABG, dementia, asthma, alcohol use disorder, and recent CVA with right sided weakness (on 5/19/2024) that presented to the ED on 5/22 with generalized weakness s/p two falls at home.    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  - Trauma workup on admission unremarkable, no fractures  - EEG : Global focal slowing; no epileptic activity seen; non specific   -Echo pending - can be done on rehab per EPS and Neuro  -s/p ILR 5/24   - c/w ASA and plavix for 3 weeks (started 5/19; Plavix end date 6/10) and then ASA 81mg only  -c/w lipitor 80mg   -PT/OT/SLP eval and treat   -neuropsych consult    #Orthostasis - improving   - Increasing BUN and skin tenting c/w hypovolemia 5/26  - son states that patient does not drink much  - given IVF 5/26 and asymptomatic 5/27.   - encourage PO fluids and monitor    # CAD s/p CABG  # Hypertension: uncontrolled on admission  - pt started on Lisinopril 5 mg   - Monitor BP adjust medications as necessary   - BP in good range    #Alcohol Use Disorder  - Patient reports that patient drinks approx 4-5 glasses of wine per day, has been drinking this way "for years"  - folic acid and thiamine supplementation    #diarrhea - improved  - possible related to ETOH w/drawl  - spoke with son - this is a chronic condition at home (patient with poor memory - poor histoiran)  -loperamide prn  -low lactose diet  - monitor  - can f/u as an outpatient    #Long-term memory impairment with confabulation and poor insight c/w Wernicke's Encephalopathy  - c/w Donepezil 10 mg qhs  - continue thiamine  - B12 and TSH WNL    #Depression  - c/w Lexapro 30 mg qd    #Asthma  - c/w Montelukast 10 mg qd      -Pain control: Tylenol prn     -Skin:  No active issues at this time    Diet, DASH/TLC:   Sodium & Cholesterol Restricted  Lactose Restricted (Milk Sugar Intoler.) (05-26-24 @ 10:32) [Active]           Precautions / PROPHYLAXIS:      - Falls      - DVT prophylaxis: lovenox    ASSESSMENT/PLAN  89 y/o F with PMHx of CABG, dementia, asthma, alcohol use disorder, and recent CVA with right sided weakness (on 5/19/2024) that presented to the ED on 5/22 with generalized weakness s/p two falls at home.    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  - Trauma workup on admission unremarkable, no fractures  - EEG : Global focal slowing; no epileptic activity seen; non specific   -Echo pending - can be done on rehab per EPS and Neuro  -s/p ILR 5/24   - c/w ASA and plavix for 3 weeks (started 5/19; Plavix end date 6/10) and then ASA 81mg only  -c/w lipitor 80mg   -PT/OT/SLP eval and treat   -neuropsych consult    #Orthostasis - improving   - Increasing BUN and skin tenting c/w hypovolemia 5/26  - son states that patient does not drink much  - given IVF 5/26 and asymptomatic 5/27.   - encourage PO fluids and monitor    # CAD s/p CABG  - c/w lipitor     #Essential Hypertension   - inpt bp goal <140/90 - above goal   - 5/29 on lisinopril 5mg   - 5/29 added extra 5mg lisinopril 5mg nightly  - 5/30 onwards c/w lisinopril 10mg nightly   - Monitor BP adjust medications as necessary     #Alcohol Use Disorder  - Patient reports that patient drinks approx 4-5 glasses of wine per day, has been drinking this way "for years"  - folic acid and thiamine supplementation    #diarrhea - improved  - possible related to ETOH w/drawl  - spoke with son - this is a chronic condition at home (patient with poor memory - poor histoiran)  -loperamide prn  -low lactose diet  - monitor  - can f/u as an outpatient    #Long-term memory impairment with confabulation and poor insight c/w Wernicke's Encephalopathy  - c/w Donepezil 10 mg qhs  - continue thiamine  - B12 and TSH WNL    #Depression  - c/w Lexapro 30 mg qd    #Asthma  - c/w Montelukast 10 mg qd      -Pain control: Tylenol prn     -Skin:  No active issues at this time    Diet, DASH/TLC:   Sodium & Cholesterol Restricted  Lactose Restricted (Milk Sugar Intoler.) (05-26-24 @ 10:32) [Active]           Precautions / PROPHYLAXIS:      - Falls      - DVT prophylaxis: lovenox

## 2024-05-29 NOTE — PROGRESS NOTE ADULT - TIME BILLING
Patient seen and examined with the resident. We discussed the case. I have directed the care. I edited the note. The patient requires acute rehab with 3 hours of daily therapies at least 5 out of 7 days and close physiatry follow up. I participated in the rehab interdisciplinary team meeting; the patient progressed to ambulate 75 ft RW cg assist. I did a peer to peer and was able to advocate to have the [facility] rehab hospitalization covered. Patient seen and examined with the resident. We discussed the case. I have directed the care. I edited the note. The patient requires acute rehab with 3 hours of daily therapies at least 5 out of 7 days and close physiatry follow up. I participated in the rehab interdisciplinary team meeting; the patient progressed to ambulate 75 ft RW cg assist. I did a peer to peer and was able to advocate to have the [facility] rehab hospitalization covered. I increased Torpol XL 25 mg po daily and increased Lisinopril to 10 mg po qhs for HTN. Patient seen and examined with the resident. We discussed the case. I have directed the care. I edited the note. The patient requires acute rehab with 3 hours of daily therapies at least 5 out of 7 days and close physiatry follow up. I participated in the rehab interdisciplinary team meeting; the patient progressed to ambulate 75 ft RW cg assist. I did a peer to peer and was able to advocate to have the [facility] rehab hospitalization covered. I added Torpol XL 25 mg po daily and increased Lisinopril to 10 mg po qhs for HTN.

## 2024-05-29 NOTE — PROGRESS NOTE ADULT - SUBJECTIVE AND OBJECTIVE BOX
Patient is a 88y old  Female who presents with a chief complaint of Stroke with right hemiparesis (dominant) (24 May 2024 10:14)    HPI:  88y Female with PMHx of CAD status post CABG, alcohol use disorder, asthma presented to ED initially on 5/19  with complaints of sudden onset right sided weakness beginning last night at 10pm. pt woke up with generalized weakness that was nonlocalized which caused her to feel weak while standing. Pt endorses fall last week in which she hit her head slightly. Stroke code called in ED. /91 in triage. CTH negative, CTP negative, CTA head and neck showing mild stenosis. MRI showed acute infarct of the left corona radiata/putamen.  Pt was started on DAPT and statin, and pt was discharged 5/21 per pt/family wishes with outpatient follow up for TTE and ILR.     On 5/22 pt presented to ED with generalized weakness s/p two falls at home. Son stated that [facility] was at the patient's home evaluating her for home PT/OT when they noticed that patient was unsteady on her feet, and while speaking to son on the phone, Patient fell from standing position onto her knees onto a carpeted floor, no head injury or LOC. Decision was made to call EMS; when EMS arrived and was beginning transport into [facility], patient once again fell from standing position onto her knees, no head injury or LOC. BP in ED was  193/76 and given IV labetalol. Trauma workup unremarkable.  EPS saw patient, plan  for ILR and TTE, which they said can be done on the rehab unit. Once medically stabilized patient considered a good candidate for acute rehab.     PMR team evaluated the patient and deemed her appropriate for acute rehab 2/2 decline in function with multiple comorbidities and ability to tolerate and benefit for 3 hours of therapy daily at least 5 days a week.   PLOF: patient independent with ADLs and no AD for ambulation.   Admission LOF: Matt for bed mobility and transfers, ambulates 50ft with RW CGA, Matt fo rUBD, modA for LBD, dependent for toileting  LS: Pt lives alone in private house with 1 step to enter. Has an attic with 10 steps to enter. Of note, the patient admits to drinking 4-5 glasses of wine daily, and reportedly was drinking at home after her discharge from the hospital. At this point, she does not remember being home after her stroke admission.    (24 May 2024 10:14)    TODAY'S SUBJECTIVE & REVIEW OF SYMPTOMS:  Patient seen and examined at bedside this AM. Vitals reviewed. No complaints. No overnight events. Passing bowel movements. Participating well in therapies.       Constitutional:    [ x  ] WNL           [   ] poor appetite   [   ] insomnia   [   ] tired   Cardio:                [x   ] WNL           [   ] CP   [   ] MISTRY   [   ] palpitations    [   ] weak after standing in PT           Resp:                   [ x  ] WNL           [   ] SOB   [   ] cough   [   ] wheezing   GI:                        [   ] WNL           [   ] constipation   [   x] diarrhea (family reports chronic) [   ] abdominal pain   [   ] nausea   [   ] emesis                                :                      [  x ] WNL           [   ] URIAS  [   ] dysuria   [   ] difficulty voiding             Endo:                   [  x ] WNL          [   ] polyuria   [   ] temperature intolerance                 Skin:                     [  x ] WNL          [   ] pain   [   ] wound   [   ] rash   MSK:                    [ x  ] WNL          [   ] muscle pain   [   ] joint pain/ stiffness   [   ] muscle tenderness   [   ] swelling   Neuro:                 [   ] WNL          [   ] HA   [   ] change in vision   [   ] tremor   [x   ] weakness   [   ]dysphagia              Cognitive:           [   ] WNL           [  x ]confusion    (patient is confused and a poor historian, though not aware of her deficits)   Psych:                  [ x  ] WNL           [   ] hallucinations   [   ]agitation   [   ] delusion   [   ]depression    PHYSICAL EXAM  Vital Signs Last 24 Hrs  T(C): 36.7 (29 May 2024 05:30), Max: 36.7 (29 May 2024 05:30)  T(F): 98 (29 May 2024 05:30), Max: 98 (29 May 2024 05:30)  HR: 55 (29 May 2024 05:30) (55 - 80)  BP: 142/78 (29 May 2024 05:30) (119/62 - 188/81)  BP(mean): 117 (28 May 2024 21:54) (117 - 117)  RR: 18 (29 May 2024 05:30) (18 - 19)  SpO2: --        General:[  x ] NAD, Resting Comfortable,   [   ] other:                                HEENT: [x   ] NC/AT, EOMI, PERRL , Normal Conjunctivae,   [   ] other:  Cardio: [x   ] RRR, no murmer,   [   ] other:                              Pulm: [  x ] No Respiratory Distress,  Lungs CTAB,   [   ] other:                       Abdomen: [  x ]ND/NT, Soft,   [   ] other:    : [  x ] NO URIAS CATHETER, [   ] URIAS CATHETER- no meatal tear, no discharge, [   ] other:                                            MSK: [  x ] No joint swelling, Full ROM,   [   ] other:                                         Ext: [ x  ]No C/C/E, No calf tenderness,   [   ]other:    Skin: [ x  ]intact,   [   ] other:  significant tenting of skin back of hand                                                                 Neurological Examination:  Cognitive: [    ] AAO x 3,   [x    ]  other: AOx2, not to year. Poor historian.                                                                  Attention:  [  x  ] intact,   [    ]  other:  able to follow 2 step commands and spell 'WORLD" backwards                          Memory: [    ] intact,    [ x   ]  other: 1/3 5 min. recall, 2/3 word recall with cues. Poor historian, poor insight. Confabulates.  Mood/Affect: [   x ] wnl,    [    ]  other:                                                                             Communication: [    ]Fluent, no dysarthria, following commands:  [   x ] other: slow, hesitant speech, fluent, comprehension and repetition intact, decreased word finding.  CN II - XII:  [   x ] intact,  [    ] other:                                                                                        Motor:   RIGHT UE: [   ] WNL,  [   x] other: Shoulder abduction, EF/EF 4/5 hand  and wrist extension 5/5  LEFT    UE: [x   ] WNL,  [   ] other:  RIGHT LE: [   ] WNL,  [ x  ] other: HF 4/5, DR 4/5  LEFT    LE: [ x  ] WNL,  [   ] other:    Tone: [x    ] wnl,   [    ]  other:  DTRs: [ x  ]symmetric, [   ] other:  Coordination:   [   x ] intact,   [ x   ] other: decreased proprioception on RLE                                                                          Sensory: [     ] Intact to light touch,   [  x  ] other: impaired kinesthetic awareness RUE, decreased proprioception right great toe > right hand      MEDICATIONS  (STANDING):  aspirin enteric coated 81 milliGRAM(s) Oral daily  atorvastatin 80 milliGRAM(s) Oral at bedtime  chlorhexidine 2% Cloths 1 Application(s) Topical <User Schedule>  clopidogrel Tablet 75 milliGRAM(s) Oral daily  donepezil 10 milliGRAM(s) Oral at bedtime  enoxaparin Injectable 40 milliGRAM(s) SubCutaneous every 24 hours  escitalopram 30 milliGRAM(s) Oral daily  folic acid 1 milliGRAM(s) Oral daily  lisinopril 5 milliGRAM(s) Oral daily  montelukast 10 milliGRAM(s) Oral daily  pantoprazole    Tablet 40 milliGRAM(s) Oral before breakfast  sodium chloride 0.9%. 1000 milliLiter(s) (75 mL/Hr) IV Continuous <Continuous>  thiamine 100 milliGRAM(s) Oral daily    MEDICATIONS  (PRN):  acetaminophen     Tablet .. 650 milliGRAM(s) Oral every 6 hours PRN Temp greater or equal to 38C (100.4F), Mild Pain (1 - 3), Moderate Pain (4 - 6), Severe Pain (7 - 10)  loperamide 2 milliGRAM(s) Oral two times a day PRN Diarrhea  LORazepam     Tablet 1 milliGRAM(s) Oral every 2 hours PRN CIWA-Ar score increase by 2 points and a total score of 7 or less  melatonin 3 milliGRAM(s) Oral at bedtime PRN Insomnia          RECENT LABS/IMAGING                          13.1   5.28  )-----------( 234      ( 27 May 2024 05:33 )             39.4     05-27    139  |  109  |  22<H>  ----------------------------<  91  4.6   |  16<L>  |  0.9    Ca    8.8      27 May 2024 05:33  Mg     2.1     05-27    TPro  6.3  /  Alb  3.9  /  TBili  0.6  /  DBili  x   /  AST  33  /  ALT  30  /  AlkPhos  69  05-27       Patient is a 88y old  Female who presents with a chief complaint of Stroke with right hemiparesis (dominant) (24 May 2024 10:14)    HPI:  88y Female with PMHx of CAD status post CABG, alcohol use disorder, asthma presented to ED initially on 5/19  with complaints of sudden onset right sided weakness beginning last night at 10pm. pt woke up with generalized weakness that was nonlocalized which caused her to feel weak while standing. Pt endorses fall last week in which she hit her head slightly. Stroke code called in ED. /91 in triage. CTH negative, CTP negative, CTA head and neck showing mild stenosis. MRI showed acute infarct of the left corona radiata/putamen.  Pt was started on DAPT and statin, and pt was discharged 5/21 per pt/family wishes with outpatient follow up for TTE and ILR.     On 5/22 pt presented to ED with generalized weakness s/p two falls at home. Son stated that [facility] was at the patient's home evaluating her for home PT/OT when they noticed that patient was unsteady on her feet, and while speaking to son on the phone, Patient fell from standing position onto her knees onto a carpeted floor, no head injury or LOC. Decision was made to call EMS; when EMS arrived and was beginning transport into [facility], patient once again fell from standing position onto her knees, no head injury or LOC. BP in ED was  193/76 and given IV labetalol. Trauma workup unremarkable.  EPS saw patient, plan  for ILR and TTE, which they said can be done on the rehab unit. Once medically stabilized patient considered a good candidate for acute rehab.     PMR team evaluated the patient and deemed her appropriate for acute rehab 2/2 decline in function with multiple comorbidities and ability to tolerate and benefit for 3 hours of therapy daily at least 5 days a week.   PLOF: patient independent with ADLs and no AD for ambulation.   Admission LOF: Matt for bed mobility and transfers, ambulates 50ft with RW CGA, Matt fo rUBD, modA for LBD, dependent for toileting  LS: Pt lives alone in private house with 1 step to enter. Has an attic with 10 steps to enter. Of note, the patient admits to drinking 4-5 glasses of wine daily, and reportedly was drinking at home after her discharge from the hospital. At this point, she does not remember being home after her stroke admission.    (24 May 2024 10:14)    TODAY'S SUBJECTIVE & REVIEW OF SYMPTOMS:  Patient seen and examined at bedside this AM. Vitals reviewed. No complaints. No overnight events. Passing bowel movements. Participating well in therapies.       Constitutional:    [ x  ] WNL           [   ] poor appetite   [   ] insomnia   [   ] tired   Cardio:                [x   ] WNL           [   ] CP   [   ] MISTRY   [   ] palpitations    [   ] weak after standing in PT           Resp:                   [ x  ] WNL           [   ] SOB   [   ] cough   [   ] wheezing   GI:                        [   ] WNL           [   ] constipation   [   x] diarrhea (family reports chronic) [   ] abdominal pain   [   ] nausea   [   ] emesis                                :                      [  x ] WNL           [   ] URIAS  [   ] dysuria   [   ] difficulty voiding             Endo:                   [  x ] WNL          [   ] polyuria   [   ] temperature intolerance                 Skin:                     [  x ] WNL          [   ] pain   [   ] wound   [   ] rash   MSK:                    [ x  ] WNL          [   ] muscle pain   [   ] joint pain/ stiffness   [   ] muscle tenderness   [   ] swelling   Neuro:                 [   ] WNL          [   ] HA   [   ] change in vision   [   ] tremor   [x   ] weakness   [   ]dysphagia              Cognitive:           [   ] WNL           [  x ]confusion    (patient is confused and a poor historian, though not aware of her deficits)   Psych:                  [ x  ] WNL           [   ] hallucinations   [   ]agitation   [   ] delusion   [   ]depression    PHYSICAL EXAM  Vital Signs Last 24 Hrs  T(C): 36.7 (29 May 2024 05:30), Max: 36.7 (29 May 2024 05:30)  T(F): 98 (29 May 2024 05:30), Max: 98 (29 May 2024 05:30)  HR: 55 (29 May 2024 05:30) (55 - 80)  BP: 142/78 (29 May 2024 05:30) (119/62 - 188/81)  BP(mean): 117 (28 May 2024 21:54) (117 - 117)  RR: 18 (29 May 2024 05:30) (18 - 19)  SpO2: --        General:[  x ] NAD, Resting Comfortable,   [   ] other:                                HEENT: [x   ] NC/AT, EOMI, PERRL , Normal Conjunctivae,   [   ] other:  Cardio: [x   ] RRR, no murmer,   [   ] other:                              Pulm: [  x ] No Respiratory Distress,  Lungs CTAB,   [   ] other:                       Abdomen: [  x ]ND/NT, Soft,   [   ] other:    : [  x ] NO URIAS CATHETER, [   ] URIAS CATHETER- no meatal tear, no discharge, [   ] other:                                            MSK: [  x ] No joint swelling, Full ROM,   [   ] other:                                         Ext: [ x  ]No C/C/E, No calf tenderness,   [   ]other:    Skin: [ x  ]intact,   [   ] other:  significant tenting of skin back of hand                                                                 Neurological Examination:  Cognitive: [    ] AAO x 3,   [x    ]  other: AOx2, not to year. Poor historian.                                                                  Attention:  [  x  ] intact,   [    ]  other:  able to follow 2 step commands and spell 'WORLD" backwards                          Memory: [    ] intact,    [ x   ]  other: 1/3 5 min. recall, 2/3 word recall with cues. Poor historian, poor insight. Confabulates.  Mood/Affect: [   x ] wnl,    [    ]  other:                                                                             Communication: [    ]Fluent, no dysarthria, following commands:  [   x ] other: slow, hesitant speech, fluent, comprehension and repetition intact, decreased word finding.  CN II - XII:  [   x ] intact,  [    ] other:                                                                                        Motor:   RIGHT UE: [   ] WNL,  [   x] other: Shoulder abduction, EF/EF 4/5 hand  and wrist extension 5/5  MEDICATIONS  (STANDING):  aspirin enteric coated 81 milliGRAM(s) Oral daily  atorvastatin 80 milliGRAM(s) Oral at bedtime  chlorhexidine 2% Cloths 1 Application(s) Topical <User Schedule>  clopidogrel Tablet 75 milliGRAM(s) Oral daily  donepezil 10 milliGRAM(s) Oral at bedtime  enoxaparin Injectable 40 milliGRAM(s) SubCutaneous every 24 hours  escitalopram 30 milliGRAM(s) Oral daily  folic acid 1 milliGRAM(s) Oral daily  lisinopril 5 milliGRAM(s) Oral at bedtime  montelukast 10 milliGRAM(s) Oral daily  pantoprazole    Tablet 40 milliGRAM(s) Oral before breakfast  sodium chloride 0.9%. 1000 milliLiter(s) (75 mL/Hr) IV Continuous <Continuous>  thiamine 100 milliGRAM(s) Oral daily    MEDICATIONS  (PRN):  acetaminophen     Tablet .. 650 milliGRAM(s) Oral every 6 hours PRN Temp greater or equal to 38C (100.4F), Mild Pain (1 - 3), Moderate Pain (4 - 6), Severe Pain (7 - 10)  loperamide 2 milliGRAM(s) Oral two times a day PRN Diarrhea  LORazepam     Tablet 1 milliGRAM(s) Oral every 2 hours PRN CIWA-Ar score increase by 2 points and a total score of 7 or less  melatonin 3 milliGRAM(s) Oral at bedtime PRN Insomnia  LEFT    UE: [x   ] WNL,  [   ] other:  RIGHT LE: [   ] WNL,  [ x  ] other: HF 4/5, DR 4/5  LEFT    LE: [ x  ] WNL,  [   ] other:    Tone: [x    ] wnl,   [    ]  other:  DTRs: [ x  ]symmetric, [   ] other:  Coordination:   [   x ] intact,   [ x   ] other: decreased proprioception on RLE                                                                          Sensory: [     ] Intact to light touch,   [  x  ] other: impaired kinesthetic awareness RUE, decreased proprioception right great toe > right hand        RECENT LABS/IMAGING                          13.1   5.28  )-----------( 234      ( 27 May 2024 05:33 )             39.4     05-27    139  |  109  |  22<H>  ----------------------------<  91  4.6   |  16<L>  |  0.9    Ca    8.8      27 May 2024 05:33  Mg     2.1     05-27    TPro  6.3  /  Alb  3.9  /  TBili  0.6  /  DBili  x   /  AST  33  /  ALT  30  /  AlkPhos  69  05-27

## 2024-05-29 NOTE — PROGRESS NOTE ADULT - ATTENDING COMMENTS
Patient seen and examined with the resident. We discussed the case. I have directed the care. I edited the note. The patient requires acute rehab with 3 hours of daily therapies at least 5 out of 7 days and close physiatry follow up. I participated in the rehab interdisciplinary team meeting; the patient progressed to ambulate 75 ft RW cg assist. I did a peer to peer and was able to advocate to have the [facility] rehab hospitalization covered.   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  - Trauma workup on admission unremarkable, no fractures  - EEG : Global focal slowing; no epileptic activity seen; non specific   -Echo pending - can be done on rehab per EPS and Neuro  -s/p ILR 5/24   - c/w ASA and plavix for 3 weeks (started 5/19; Plavix end date 6/10) and then ASA 81mg only  -c/w lipitor 80mg   -PT/OT/SLP eval and treat   -neuropsych consult    #Orthostasis - improving   - Increasing BUN and skin tenting c/w hypovolemia 5/26  - son states that patient does not drink much  - given IVF 5/26 and asymptomatic 5/27.   - encourage PO fluids and monitor    # CAD s/p CABG  - c/w lipitor     #Essential Hypertension   - inpt bp goal <140/90 - above goal   - 5/29 on lisinopril 5mg   - 5/29 added extra 5mg lisinopril 5mg nightly  - 5/30 onwards c/w lisinopril 10mg nightly   - Monitor BP adjust medications as necessary     #Alcohol Use Disorder  - Patient reports that patient drinks approx 4-5 glasses of wine per day, has been drinking this way "for years"  - folic acid and thiamine supplementation    #diarrhea - improved  - possible related to ETOH w/drawl  - spoke with son - this is a chronic condition at home (patient with poor memory - poor histoiran)  -loperamide prn  -low lactose diet  - monitor  - can f/u as an outpatient    #Long-term memory impairment with confabulation and poor insight c/w Wernicke's Encephalopathy  - c/w Donepezil 10 mg qhs  - continue thiamine  - B12 and TSH WNL    #Depression  - c/w Lexapro 30 mg qd    #Asthma  - c/w Montelukast 10 mg qd      -Pain control: Tylenol prn     -Skin:  No active issues at this time    Diet, DASH/TLC:   Sodium & Cholesterol Restricted  Lactose Restricted (Milk Sugar Intoler.) (05-26-24 @ 10:32) [Active] Patient seen and examined with the resident. We discussed the case. I have directed the care. I edited the note. The patient requires acute rehab with 3 hours of daily therapies at least 5 out of 7 days and close physiatry follow up. I participated in the rehab interdisciplinary team meeting; the patient progressed to ambulate 75 ft RW cg assist. I did a peer to peer and was able to advocate to have the [facility] rehab hospitalization covered.  I increased Torpol XL 25 mg po daily and increased Lisinopril to 10 mg po qhs for HTN.   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  - Trauma workup on admission unremarkable, no fractures  - EEG : Global focal slowing; no epileptic activity seen; non specific   -Echo pending - can be done on rehab per EPS and Neuro  -s/p ILR 5/24   - c/w ASA and plavix for 3 weeks (started 5/19; Plavix end date 6/10) and then ASA 81mg only  -c/w lipitor 80mg   -PT/OT/SLP eval and treat   -neuropsych consult    #Orthostasis - improving   - Increasing BUN and skin tenting c/w hypovolemia 5/26  - son states that patient does not drink much  - given IVF 5/26 and asymptomatic 5/27.   - encourage PO fluids and monitor    # CAD s/p CABG  - c/w lipitor     #Essential Hypertension   - inpt bp goal <140/90 - above goal   - 5/29 on lisinopril 5mg   - 5/29 added extra 5mg lisinopril 5mg nightly  - 5/30 onwards c/w lisinopril 10mg nightly   - Monitor BP adjust medications as necessary     #Alcohol Use Disorder  - Patient reports that patient drinks approx 4-5 glasses of wine per day, has been drinking this way "for years"  - folic acid and thiamine supplementation    #diarrhea - improved  - possible related to ETOH w/drawl  - spoke with son - this is a chronic condition at home (patient with poor memory - poor histoiran)  -loperamide prn  -low lactose diet  - monitor  - can f/u as an outpatient    #Long-term memory impairment with confabulation and poor insight c/w Wernicke's Encephalopathy  - c/w Donepezil 10 mg qhs  - continue thiamine  - B12 and TSH WNL    #Depression  - c/w Lexapro 30 mg qd    #Asthma  - c/w Montelukast 10 mg qd      -Pain control: Tylenol prn     -Skin:  No active issues at this time    Diet, DASH/TLC:   Sodium & Cholesterol Restricted  Lactose Restricted (Milk Sugar Intoler.) (05-26-24 @ 10:32) [Active] Patient seen and examined with the resident. We discussed the case. I have directed the care. I edited the note. The patient requires acute rehab with 3 hours of daily therapies at least 5 out of 7 days and close physiatry follow up. I participated in the rehab interdisciplinary team meeting; the patient progressed to ambulate 75 ft RW cg assist. I did a peer to peer and was able to advocate to have the [facility] rehab hospitalization covered.  I added Torpol XL 25 mg po daily and increased Lisinopril to 10 mg po qhs for HTN.   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  - Trauma workup on admission unremarkable, no fractures  - EEG : Global focal slowing; no epileptic activity seen; non specific   -Echo pending - can be done on rehab per EPS and Neuro  -s/p ILR 5/24   - c/w ASA and plavix for 3 weeks (started 5/19; Plavix end date 6/10) and then ASA 81mg only  -c/w lipitor 80mg   -PT/OT/SLP eval and treat   -neuropsych consult    #Orthostasis - improving   - Increasing BUN and skin tenting c/w hypovolemia 5/26  - son states that patient does not drink much  - given IVF 5/26 and asymptomatic 5/27.   - encourage PO fluids and monitor    # CAD s/p CABG  - c/w lipitor     #Essential Hypertension   - inpt bp goal <140/90 - above goal   - 5/29 on lisinopril 5mg   - 5/29 added extra 5mg lisinopril 5mg nightly  - 5/30 onwards c/w lisinopril 10mg nightly   - Monitor BP adjust medications as necessary     #Alcohol Use Disorder  - Patient reports that patient drinks approx 4-5 glasses of wine per day, has been drinking this way "for years"  - folic acid and thiamine supplementation    #diarrhea - improved  - possible related to ETOH w/drawl  - spoke with son - this is a chronic condition at home (patient with poor memory - poor histoiran)  -loperamide prn  -low lactose diet  - monitor  - can f/u as an outpatient    #Long-term memory impairment with confabulation and poor insight c/w Wernicke's Encephalopathy  - c/w Donepezil 10 mg qhs  - continue thiamine  - B12 and TSH WNL    #Depression  - c/w Lexapro 30 mg qd    #Asthma  - c/w Montelukast 10 mg qd      -Pain control: Tylenol prn     -Skin:  No active issues at this time    Diet, DASH/TLC:   Sodium & Cholesterol Restricted  Lactose Restricted (Milk Sugar Intoler.) (05-26-24 @ 10:32) [Active]

## 2024-05-30 RX ADMIN — ESCITALOPRAM OXALATE 30 MILLIGRAM(S): 10 TABLET, FILM COATED ORAL at 13:12

## 2024-05-30 RX ADMIN — LISINOPRIL 10 MILLIGRAM(S): 2.5 TABLET ORAL at 21:48

## 2024-05-30 RX ADMIN — DONEPEZIL HYDROCHLORIDE 10 MILLIGRAM(S): 10 TABLET, FILM COATED ORAL at 21:47

## 2024-05-30 RX ADMIN — Medication 81 MILLIGRAM(S): at 13:13

## 2024-05-30 RX ADMIN — PANTOPRAZOLE SODIUM 40 MILLIGRAM(S): 20 TABLET, DELAYED RELEASE ORAL at 08:33

## 2024-05-30 RX ADMIN — CHLORHEXIDINE GLUCONATE 1 APPLICATION(S): 213 SOLUTION TOPICAL at 05:41

## 2024-05-30 RX ADMIN — ATORVASTATIN CALCIUM 80 MILLIGRAM(S): 80 TABLET, FILM COATED ORAL at 21:47

## 2024-05-30 RX ADMIN — Medication 1 MILLIGRAM(S): at 13:12

## 2024-05-30 RX ADMIN — CLOPIDOGREL BISULFATE 75 MILLIGRAM(S): 75 TABLET, FILM COATED ORAL at 13:12

## 2024-05-30 RX ADMIN — Medication 25 MILLIGRAM(S): at 05:41

## 2024-05-30 RX ADMIN — MONTELUKAST 10 MILLIGRAM(S): 4 TABLET, CHEWABLE ORAL at 13:11

## 2024-05-30 RX ADMIN — Medication 100 MILLIGRAM(S): at 13:12

## 2024-05-30 RX ADMIN — ENOXAPARIN SODIUM 40 MILLIGRAM(S): 100 INJECTION SUBCUTANEOUS at 05:41

## 2024-05-30 NOTE — PROGRESS NOTE ADULT - ASSESSMENT
ASSESSMENT/PLAN  87 y/o F with PMHx of CABG, dementia, asthma, alcohol use disorder, and recent CVA with right sided weakness (on 5/19/2024) that presented to the ED on 5/22 with generalized weakness s/p two falls at home.    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  - Trauma workup on admission unremarkable, no fractures  - EEG : Global focal slowing; no epileptic activity seen; non specific   -Echo pending - can be done on rehab per EPS and Neuro  -s/p ILR 5/24   - c/w ASA and plavix for 3 weeks (started 5/19; Plavix end date 6/10) and then ASA 81mg only  -c/w lipitor 80mg   -PT/OT/SLP eval and treat   -neuropsych consult    #Orthostasis - improving   - Increasing BUN and skin tenting c/w hypovolemia 5/26  - son states that patient does not drink much  - given IVF 5/26 and asymptomatic 5/27.   - encourage PO fluids and monitor    # CAD s/p CABG  - c/w lipitor     #Essential Hypertension   - inpt bp goal <140/90 - above goal   - 5/29 on lisinopril 5mg   - 5/29 added extra 5mg lisinopril 5mg nightly  - 5/30 onwards c/w lisinopril 10mg nightly   - Monitor BP adjust medications as necessary     #Alcohol Use Disorder  - Patient reports that patient drinks approx 4-5 glasses of wine per day, has been drinking this way "for years"  - folic acid and thiamine supplementation    #diarrhea - improved  - possible related to ETOH w/drawl  - spoke with son - this is a chronic condition at home (patient with poor memory - poor histoiran)  -loperamide prn  -low lactose diet  - monitor  - can f/u as an outpatient    #Long-term memory impairment with confabulation and poor insight c/w Wernicke's Encephalopathy  - c/w Donepezil 10 mg qhs  - continue thiamine  - B12 and TSH WNL    #Depression  - c/w Lexapro 30 mg qd    #Asthma  - c/w Montelukast 10 mg qd      -Pain control: Tylenol prn     -Skin:  No active issues at this time    Diet, DASH/TLC:   Sodium & Cholesterol Restricted  Lactose Restricted (Milk Sugar Intoler.) (05-26-24 @ 10:32) [Active]           Precautions / PROPHYLAXIS:      - Falls      - DVT prophylaxis: lovenox    ASSESSMENT/PLAN  89 y/o F with PMHx of CABG, dementia, asthma, alcohol use disorder, and recent CVA with right sided weakness (on 5/19/2024) that presented to the ED on 5/22 with generalized weakness s/p two falls at home.    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  - Trauma workup on admission unremarkable, no fractures  - EEG : Global focal slowing; no epileptic activity seen; non specific   -Echo pending - can be done on rehab per EPS and Neuro  -s/p ILR 5/24   - c/w ASA and plavix for 3 weeks (started 5/19; Plavix end date 6/10) and then ASA 81mg only  -c/w lipitor 80mg   -PT/OT/SLP eval and treat   -neuropsych consult    #Orthostasis - improving   - Increasing BUN and skin tenting c/w hypovolemia 5/26  - son states that patient does not drink much  - given IVF 5/26 and asymptomatic 5/27.   - encourage PO fluids and monitor    # CAD s/p CABG  - c/w lipitor     #Essential Hypertension   - inpt bp goal <140/90 - above goal   - 5/29 on lisinopril 5mg   - 5/29 added extra 5mg lisinopril 5mg nightly  - 5/30 onwards c/w lisinopril 10mg nightly   - discontinued toprol 25mg daily due to bradycardia 5/30   - Monitor BP adjust medications as necessary     #Alcohol Use Disorder  - Patient reports that patient drinks approx 4-5 glasses of wine per day, has been drinking this way "for years"  - folic acid and thiamine supplementation    #diarrhea - improved  - possible related to ETOH w/drawl  - spoke with son - this is a chronic condition at home (patient with poor memory - poor histoiran)  -loperamide prn  -low lactose diet  - monitor  - can f/u as an outpatient    #Long-term memory impairment with confabulation and poor insight c/w Wernicke's Encephalopathy  - c/w Donepezil 10 mg qhs  - continue thiamine  - B12 and TSH WNL    #Depression  - c/w Lexapro 30 mg qd    #Asthma  - c/w Montelukast 10 mg qd      -Pain control: Tylenol prn     -Skin:  No active issues at this time    Diet, DASH/TLC:   Sodium & Cholesterol Restricted  Lactose Restricted (Milk Sugar Intoler.) (05-26-24 @ 10:32) [Active]           Precautions / PROPHYLAXIS:      - Falls      - DVT prophylaxis: lovenox    ASSESSMENT/PLAN  89 y/o F with PMHx of CABG, dementia, asthma, alcohol use disorder, and recent CVA with right sided weakness (on 5/19/2024) that presented to the ED on 5/22 with generalized weakness s/p two falls at home.    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  - Trauma workup on admission unremarkable, no fractures  - EEG : Global focal slowing; no epileptic activity seen; non specific   -Echo pending - can be done on rehab per EPS and Neuro  -s/p ILR 5/24   - c/w ASA and plavix for 3 weeks (started 5/19; Plavix end date 6/10) and then ASA 81mg only  -c/w lipitor 80mg   -PT/OT/SLP eval and treat   -neuropsych consult    #Orthostasis - improving   - Increasing BUN and skin tenting c/w hypovolemia 5/26  - son states that patient does not drink much  - given IVF 5/26 and asymptomatic 5/27.   - encourage PO fluids and monitor    # CAD s/p CABG  - c/w lipitor     #Essential Hypertension   - inpt bp goal <140/90 - above goal   - 5/29 on lisinopril 5mg   - 5/29 added extra 5mg lisinopril 5mg nightly  - 5/30 onwards c/w lisinopril 10mg nightly   - discontinued toprol 25mg daily due to bradycardia 5/30   - Monitor BP adjust medications as necessary     #Alcohol Use Disorder  - Patient reports that patient drinks approx 4-5 glasses of wine per day, has been drinking this way "for years"  - folic acid and thiamine supplementation    #diarrhea - improved  - possible related to ETOH w/drawl  - spoke with son - this is a chronic condition at home (patient with poor memory - poor historian)  -loperamide prn  -low lactose diet  - monitor  - can f/u as an outpatient    #Long-term memory impairment with confabulation and poor insight c/w Wernicke's Encephalopathy  - c/w Donepezil 10 mg qhs  - continue thiamine  - B12 and TSH WNL    #Depression  - c/w Lexapro 30 mg qd    #Asthma  - c/w Montelukast 10 mg qd      -Pain control: Tylenol prn     -Skin:  No active issues at this time    Diet, DASH/TLC:   Sodium & Cholesterol Restricted  Lactose Restricted (Milk Sugar Intoler.) (05-26-24 @ 10:32) [Active]           Precautions / PROPHYLAXIS:      - Falls      - DVT prophylaxis: lovenox

## 2024-05-30 NOTE — PROGRESS NOTE ADULT - SUBJECTIVE AND OBJECTIVE BOX
Patient is a 88y old  Female who presents with a chief complaint of Stroke with right hemiparesis (dominant) (24 May 2024 10:14)    HPI:  88y Female with PMHx of CAD status post CABG, alcohol use disorder, asthma presented to ED initially on 5/19  with complaints of sudden onset right sided weakness beginning last night at 10pm. pt woke up with generalized weakness that was nonlocalized which caused her to feel weak while standing. Pt endorses fall last week in which she hit her head slightly. Stroke code called in ED. /91 in triage. CTH negative, CTP negative, CTA head and neck showing mild stenosis. MRI showed acute infarct of the left corona radiata/putamen.  Pt was started on DAPT and statin, and pt was discharged 5/21 per pt/family wishes with outpatient follow up for TTE and ILR.     On 5/22 pt presented to ED with generalized weakness s/p two falls at home. Son stated that [facility] was at the patient's home evaluating her for home PT/OT when they noticed that patient was unsteady on her feet, and while speaking to son on the phone, Patient fell from standing position onto her knees onto a carpeted floor, no head injury or LOC. Decision was made to call EMS; when EMS arrived and was beginning transport into [facility], patient once again fell from standing position onto her knees, no head injury or LOC. BP in ED was  193/76 and given IV labetalol. Trauma workup unremarkable.  EPS saw patient, plan  for ILR and TTE, which they said can be done on the rehab unit. Once medically stabilized patient considered a good candidate for acute rehab.     PMR team evaluated the patient and deemed her appropriate for acute rehab 2/2 decline in function with multiple comorbidities and ability to tolerate and benefit for 3 hours of therapy daily at least 5 days a week.   PLOF: patient independent with ADLs and no AD for ambulation.   Admission LOF: Matt for bed mobility and transfers, ambulates 50ft with RW CGA, Matt fo rUBD, modA for LBD, dependent for toileting  LS: Pt lives alone in private house with 1 step to enter. Has an attic with 10 steps to enter. Of note, the patient admits to drinking 4-5 glasses of wine daily, and reportedly was drinking at home after her discharge from the hospital. At this point, she does not remember being home after her stroke admission.    (24 May 2024 10:14)    TODAY'S SUBJECTIVE & REVIEW OF SYMPTOMS:  Vitals reviewed.  No overnight events. Passing bowel movements. Participating well in therapies.     Constitutional:    [ x  ] WNL           [   ] poor appetite   [   ] insomnia   [   ] tired   Cardio:                [x   ] WNL           [   ] CP   [   ] MISTRY   [   ] palpitations    [   ] weak after standing in PT           Resp:                   [ x  ] WNL           [   ] SOB   [   ] cough   [   ] wheezing   GI:                        [   ] WNL           [   ] constipation   [   x] diarrhea (family reports chronic) [   ] abdominal pain   [   ] nausea   [   ] emesis                                :                      [  x ] WNL           [   ] URIAS  [   ] dysuria   [   ] difficulty voiding             Endo:                   [  x ] WNL          [   ] polyuria   [   ] temperature intolerance                 Skin:                     [  x ] WNL          [   ] pain   [   ] wound   [   ] rash   MSK:                    [ x  ] WNL          [   ] muscle pain   [   ] joint pain/ stiffness   [   ] muscle tenderness   [   ] swelling   Neuro:                 [   ] WNL          [   ] HA   [   ] change in vision   [   ] tremor   [x   ] weakness   [   ]dysphagia              Cognitive:           [   ] WNL           [  x ]confusion    (patient is confused and a poor historian, though not aware of her deficits)   Psych:                  [ x  ] WNL           [   ] hallucinations   [   ]agitation   [   ] delusion   [   ]depression    PHYSICAL EXAM  Vital Signs Last 24 Hrs  T(C): 37.1 (30 May 2024 04:46), Max: 37.1 (30 May 2024 04:46)  T(F): 98.7 (30 May 2024 04:46), Max: 98.7 (30 May 2024 04:46)  HR: 50 (30 May 2024 04:46) (50 - 59)  BP: 114/64 (30 May 2024 04:46) (114/64 - 144/69)  BP(mean): --  RR: 18 (30 May 2024 04:46) (18 - 18)  SpO2: --    General:[  x ] NAD, Resting Comfortable,   [   ] other:                                HEENT: [x   ] NC/AT, EOMI, PERRL , Normal Conjunctivae,   [   ] other:  Cardio: [x   ] RRR, no murmer,   [   ] other:                              Pulm: [  x ] No Respiratory Distress,  Lungs CTAB,   [   ] other:                       Abdomen: [  x ]ND/NT, Soft,   [   ] other:    : [  x ] NO URIAS CATHETER, [   ] URIAS CATHETER- no meatal tear, no discharge, [   ] other:                                            MSK: [  x ] No joint swelling, Full ROM,   [   ] other:                                         Ext: [ x  ]No C/C/E, No calf tenderness,   [   ]other:    Skin: [ x  ]intact,   [   ] other:  significant tenting of skin back of hand                                                                 Neurological Examination:  Cognitive: [    ] AAO x 3,   [x    ]  other: AOx2, not to year. Poor historian.                                                                  Attention:  [  x  ] intact,   [    ]  other:  able to follow 2 step commands and spell 'WORLD" backwards                          Memory: [    ] intact,    [ x   ]  other: 1/3 5 min. recall, 2/3 word recall with cues. Poor historian, poor insight. Confabulates.  Mood/Affect: [   x ] wnl,    [    ]  other:                                                                             Communication: [    ]Fluent, no dysarthria, following commands:  [   x ] other: slow, hesitant speech, fluent, comprehension and repetition intact, decreased word finding.  CN II - XII:  [   x ] intact,  [    ] other:                                                                                        Motor:   RIGHT UE: [   ] WNL,  [   x] other: Shoulder abduction, EF/EF 4/5 hand  and wrist extension 5/5  MEDICATIONS  (STANDING):  aspirin enteric coated 81 milliGRAM(s) Oral daily  atorvastatin 80 milliGRAM(s) Oral at bedtime  chlorhexidine 2% Cloths 1 Application(s) Topical <User Schedule>  clopidogrel Tablet 75 milliGRAM(s) Oral daily  donepezil 10 milliGRAM(s) Oral at bedtime  enoxaparin Injectable 40 milliGRAM(s) SubCutaneous every 24 hours  escitalopram 30 milliGRAM(s) Oral daily  folic acid 1 milliGRAM(s) Oral daily  lisinopril 5 milliGRAM(s) Oral at bedtime  montelukast 10 milliGRAM(s) Oral daily  pantoprazole    Tablet 40 milliGRAM(s) Oral before breakfast  sodium chloride 0.9%. 1000 milliLiter(s) (75 mL/Hr) IV Continuous <Continuous>  thiamine 100 milliGRAM(s) Oral daily    MEDICATIONS  (PRN):  acetaminophen     Tablet .. 650 milliGRAM(s) Oral every 6 hours PRN Temp greater or equal to 38C (100.4F), Mild Pain (1 - 3), Moderate Pain (4 - 6), Severe Pain (7 - 10)  loperamide 2 milliGRAM(s) Oral two times a day PRN Diarrhea  LORazepam     Tablet 1 milliGRAM(s) Oral every 2 hours PRN CIWA-Ar score increase by 2 points and a total score of 7 or less  melatonin 3 milliGRAM(s) Oral at bedtime PRN Insomnia  LEFT    UE: [x   ] WNL,  [   ] other:  RIGHT LE: [   ] WNL,  [ x  ] other: HF 4/5, DR 4/5  LEFT    LE: [ x  ] WNL,  [   ] other:    Tone: [x    ] wnl,   [    ]  other:  DTRs: [ x  ]symmetric, [   ] other:  Coordination:   [   x ] intact,   [ x   ] other: decreased proprioception on RLE                                                                          Sensory: [     ] Intact to light touch,   [  x  ] other: impaired kinesthetic awareness RUE, decreased proprioception right great toe > right hand      RECENT LABS/IMAGING                          13.1   5.28  )-----------( 234      ( 27 May 2024 05:33 )             39.4     05-27    139  |  109  |  22<H>  ----------------------------<  91  4.6   |  16<L>  |  0.9    Ca    8.8      27 May 2024 05:33  Mg     2.1     05-27    TPro  6.3  /  Alb  3.9  /  TBili  0.6  /  DBili  x   /  AST  33  /  ALT  30  /  AlkPhos  69  05-27       Patient is a 88y old  Female who presents with a chief complaint of Stroke with right hemiparesis (dominant) (24 May 2024 10:14)    HPI:  88y Female with PMHx of CAD status post CABG, alcohol use disorder, asthma presented to ED initially on 5/19  with complaints of sudden onset right sided weakness beginning last night at 10pm. pt woke up with generalized weakness that was nonlocalized which caused her to feel weak while standing. Pt endorses fall last week in which she hit her head slightly. Stroke code called in ED. /91 in triage. CTH negative, CTP negative, CTA head and neck showing mild stenosis. MRI showed acute infarct of the left corona radiata/putamen.  Pt was started on DAPT and statin, and pt was discharged 5/21 per pt/family wishes with outpatient follow up for TTE and ILR.     On 5/22 pt presented to ED with generalized weakness s/p two falls at home. Son stated that [facility] was at the patient's home evaluating her for home PT/OT when they noticed that patient was unsteady on her feet, and while speaking to son on the phone, Patient fell from standing position onto her knees onto a carpeted floor, no head injury or LOC. Decision was made to call EMS; when EMS arrived and was beginning transport into [facility], patient once again fell from standing position onto her knees, no head injury or LOC. BP in ED was  193/76 and given IV labetalol. Trauma workup unremarkable.  EPS saw patient, plan  for ILR and TTE, which they said can be done on the rehab unit. Once medically stabilized patient considered a good candidate for acute rehab.     PMR team evaluated the patient and deemed her appropriate for acute rehab 2/2 decline in function with multiple comorbidities and ability to tolerate and benefit for 3 hours of therapy daily at least 5 days a week.   PLOF: patient independent with ADLs and no AD for ambulation.   Admission LOF: Matt for bed mobility and transfers, ambulates 50ft with RW CGA, Matt fo rUBD, modA for LBD, dependent for toileting  LS: Pt lives alone in private house with 1 step to enter. Has an attic with 10 steps to enter. Of note, the patient admits to drinking 4-5 glasses of wine daily, and reportedly was drinking at home after her discharge from the hospital. At this point, she does not remember being home after her stroke admission.    (24 May 2024 10:14)    TODAY'S SUBJECTIVE & REVIEW OF SYMPTOMS:  Patient seen and evaluated at bedside this AM. No overnight events. Vitals reviewed.  Passing bowel movements. Participating well in therapies.     Constitutional:    [ x  ] WNL           [   ] poor appetite   [   ] insomnia   [   ] tired   Cardio:                [x   ] WNL           [   ] CP   [   ] MISTRY   [   ] palpitations    [   ] weak after standing in PT           Resp:                   [ x  ] WNL           [   ] SOB   [   ] cough   [   ] wheezing   GI:                        [   ] WNL           [   ] constipation   [   x] diarrhea (family reports chronic) [   ] abdominal pain   [   ] nausea   [   ] emesis                                :                      [  x ] WNL           [   ] URIAS  [   ] dysuria   [   ] difficulty voiding             Endo:                   [  x ] WNL          [   ] polyuria   [   ] temperature intolerance                 Skin:                     [  x ] WNL          [   ] pain   [   ] wound   [   ] rash   MSK:                    [ x  ] WNL          [   ] muscle pain   [   ] joint pain/ stiffness   [   ] muscle tenderness   [   ] swelling   Neuro:                 [   ] WNL          [   ] HA   [   ] change in vision   [   ] tremor   [x   ] weakness   [   ]dysphagia              Cognitive:           [   ] WNL           [  x ]confusion    (patient is confused and a poor historian, though not aware of her deficits)   Psych:                  [ x  ] WNL           [   ] hallucinations   [   ]agitation   [   ] delusion   [   ]depression    PHYSICAL EXAM  Vital Signs Last 24 Hrs  T(C): 37.1 (30 May 2024 04:46), Max: 37.1 (30 May 2024 04:46)  T(F): 98.7 (30 May 2024 04:46), Max: 98.7 (30 May 2024 04:46)  HR: 50 (30 May 2024 04:46) (50 - 59)  BP: 114/64 (30 May 2024 04:46) (114/64 - 144/69)  BP(mean): --  RR: 18 (30 May 2024 04:46) (18 - 18)  SpO2: --    General:[  x ] NAD, Resting Comfortable,   [   ] other:                                HEENT: [x   ] NC/AT, EOMI, PERRL , Normal Conjunctivae,   [   ] other:  Cardio: [x   ] RRR, no murmer,   [   ] other:                              Pulm: [  x ] No Respiratory Distress,  Lungs CTAB,   [   ] other:                       Abdomen: [  x ]ND/NT, Soft,   [   ] other:    : [  x ] NO URIAS CATHETER, [   ] URIAS CATHETER- no meatal tear, no discharge, [   ] other:                                            MSK: [  x ] No joint swelling, Full ROM,   [   ] other:                                         Ext: [ x  ]No C/C/E, No calf tenderness,   [   ]other:    Skin: [ x  ]intact,   [   ] other:  significant tenting of skin back of hand                                                                 Neurological Examination:  Cognitive: [    ] AAO x 3,   [x    ]  other: AOx2, not to year. Poor historian.                                                                  Attention:  [  x  ] intact,   [    ]  other:  able to follow 2 step commands and spell 'WORLD" backwards                          Memory: [    ] intact,    [ x   ]  other: 1/3 5 min. recall, 2/3 word recall with cues. Poor historian, poor insight. Confabulates.  Mood/Affect: [   x ] wnl,    [    ]  other:                                                                             Communication: [    ]Fluent, no dysarthria, following commands:  [   x ] other: slow, hesitant speech, fluent, comprehension and repetition intact, decreased word finding.  CN II - XII:  [   x ] intact,  [    ] other:                                                                                        Motor:   RIGHT UE: [   ] WNL,  [   x] other: Shoulder abduction, EF/EF 4/5 hand  and wrist extension 5/5  MEDICATIONS  (STANDING):  aspirin enteric coated 81 milliGRAM(s) Oral daily  atorvastatin 80 milliGRAM(s) Oral at bedtime  chlorhexidine 2% Cloths 1 Application(s) Topical <User Schedule>  clopidogrel Tablet 75 milliGRAM(s) Oral daily  donepezil 10 milliGRAM(s) Oral at bedtime  enoxaparin Injectable 40 milliGRAM(s) SubCutaneous every 24 hours  escitalopram 30 milliGRAM(s) Oral daily  folic acid 1 milliGRAM(s) Oral daily  lisinopril 5 milliGRAM(s) Oral at bedtime  montelukast 10 milliGRAM(s) Oral daily  pantoprazole    Tablet 40 milliGRAM(s) Oral before breakfast  sodium chloride 0.9%. 1000 milliLiter(s) (75 mL/Hr) IV Continuous <Continuous>  thiamine 100 milliGRAM(s) Oral daily    MEDICATIONS  (PRN):  acetaminophen     Tablet .. 650 milliGRAM(s) Oral every 6 hours PRN Temp greater or equal to 38C (100.4F), Mild Pain (1 - 3), Moderate Pain (4 - 6), Severe Pain (7 - 10)  loperamide 2 milliGRAM(s) Oral two times a day PRN Diarrhea  LORazepam     Tablet 1 milliGRAM(s) Oral every 2 hours PRN CIWA-Ar score increase by 2 points and a total score of 7 or less  melatonin 3 milliGRAM(s) Oral at bedtime PRN Insomnia  LEFT    UE: [x   ] WNL,  [   ] other:  RIGHT LE: [   ] WNL,  [ x  ] other: HF 4/5, DR 4/5  LEFT    LE: [ x  ] WNL,  [   ] other:    Tone: [x    ] wnl,   [    ]  other:  DTRs: [ x  ]symmetric, [   ] other:  Coordination:   [   x ] intact,   [ x   ] other: decreased proprioception on RLE                                                                          Sensory: [     ] Intact to light touch,   [  x  ] other: impaired kinesthetic awareness RUE, decreased proprioception right great toe > right hand      RECENT LABS/IMAGING                          13.1   5.28  )-----------( 234      ( 27 May 2024 05:33 )             39.4     05-27    139  |  109  |  22<H>  ----------------------------<  91  4.6   |  16<L>  |  0.9    Ca    8.8      27 May 2024 05:33  Mg     2.1     05-27    TPro  6.3  /  Alb  3.9  /  TBili  0.6  /  DBili  x   /  AST  33  /  ALT  30  /  AlkPhos  69  05-27

## 2024-05-30 NOTE — PROGRESS NOTE ADULT - ATTENDING COMMENTS
Patient seen and examined with the resident. We discussed the case. I have directed the care. I edited the note. The patient requires acute rehab with 3 hours of daily therapies at least 5 out of 7 days and close physiatry follow up. I stopped her Toprol XL 25 mg po daily on account of bradycardia.   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  - Trauma workup on admission unremarkable, no fractures  - EEG : Global focal slowing; no epileptic activity seen; non specific   -Echo pending - can be done on rehab per EPS and Neuro  -s/p ILR 5/24   - c/w ASA and plavix for 3 weeks (started 5/19; Plavix end date 6/10) and then ASA 81mg only  -c/w lipitor 80mg   -PT/OT/SLP eval and treat   -neuropsych consult    #Orthostasis - improving   - Increasing BUN and skin tenting c/w hypovolemia 5/26  - son states that patient does not drink much  - given IVF 5/26 and asymptomatic 5/27.   - encourage PO fluids and monitor    # CAD s/p CABG  - c/w lipitor     #Essential Hypertension   - inpt bp goal <140/90 - above goal   - 5/29 on lisinopril 5mg   - 5/29 added extra 5mg lisinopril 5mg nightly  - 5/30 onwards c/w lisinopril 10mg nightly   - discontinued toprol 25mg daily due to bradycardia 5/30   - Monitor BP adjust medications as necessary     #Alcohol Use Disorder  - Patient reports that patient drinks approx 4-5 glasses of wine per day, has been drinking this way "for years"  - folic acid and thiamine supplementation    #diarrhea - improved  - possible related to ETOH w/drawl  - spoke with son - this is a chronic condition at home (patient with poor memory - poor historian)  -loperamide prn  -low lactose diet  - monitor  - can f/u as an outpatient    #Long-term memory impairment with confabulation and poor insight c/w Wernicke's Encephalopathy  - c/w Donepezil 10 mg qhs  - continue thiamine  - B12 and TSH WNL    #Depression  - c/w Lexapro 30 mg qd    #Asthma  - c/w Montelukast 10 mg qd      -Pain control: Tylenol prn     -Skin:  No active issues at this time    Diet, DASH/TLC:   Sodium & Cholesterol Restricted  Lactose Restricted (Milk Sugar Intoler.) (05-26-24 @ 10:32) [Active]           Precautions / PROPHYLAXIS:      - Falls      - DVT prophylaxis: lovenox

## 2024-05-31 RX ADMIN — MONTELUKAST 10 MILLIGRAM(S): 4 TABLET, CHEWABLE ORAL at 12:05

## 2024-05-31 RX ADMIN — Medication 81 MILLIGRAM(S): at 12:05

## 2024-05-31 RX ADMIN — CLOPIDOGREL BISULFATE 75 MILLIGRAM(S): 75 TABLET, FILM COATED ORAL at 12:05

## 2024-05-31 RX ADMIN — ENOXAPARIN SODIUM 40 MILLIGRAM(S): 100 INJECTION SUBCUTANEOUS at 06:47

## 2024-05-31 RX ADMIN — Medication 100 MILLIGRAM(S): at 12:05

## 2024-05-31 RX ADMIN — CHLORHEXIDINE GLUCONATE 1 APPLICATION(S): 213 SOLUTION TOPICAL at 06:52

## 2024-05-31 RX ADMIN — LISINOPRIL 10 MILLIGRAM(S): 2.5 TABLET ORAL at 21:51

## 2024-05-31 RX ADMIN — Medication 1 MILLIGRAM(S): at 12:05

## 2024-05-31 RX ADMIN — PANTOPRAZOLE SODIUM 40 MILLIGRAM(S): 20 TABLET, DELAYED RELEASE ORAL at 06:46

## 2024-05-31 RX ADMIN — ESCITALOPRAM OXALATE 30 MILLIGRAM(S): 10 TABLET, FILM COATED ORAL at 12:05

## 2024-05-31 RX ADMIN — DONEPEZIL HYDROCHLORIDE 10 MILLIGRAM(S): 10 TABLET, FILM COATED ORAL at 21:51

## 2024-05-31 RX ADMIN — ATORVASTATIN CALCIUM 80 MILLIGRAM(S): 80 TABLET, FILM COATED ORAL at 21:51

## 2024-05-31 NOTE — PROGRESS NOTE ADULT - ASSESSMENT
ASSESSMENT/PLAN  89 y/o F with PMHx of CABG, dementia, asthma, alcohol use disorder, and recent CVA with right sided weakness (on 5/19/2024) that presented to the ED on 5/22 with generalized weakness s/p two falls at home.    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  - Trauma workup on admission unremarkable, no fractures  - EEG : Global focal slowing; no epileptic activity seen; non specific   -Echo pending - can be done on rehab per EPS and Neuro  -s/p ILR 5/24   - c/w ASA and plavix for 3 weeks (started 5/19; Plavix end date 6/10) and then ASA 81mg only  -c/w lipitor 80mg   -PT/OT/SLP eval and treat   -neuropsych consult    #Orthostasis - improving   - Increasing BUN and skin tenting c/w hypovolemia 5/26  - son states that patient does not drink much  - given IVF 5/26 and asymptomatic 5/27.   - encourage PO fluids and monitor    # CAD s/p CABG  - c/w lipitor     #Essential Hypertension   - inpt bp goal <140/90 - above goal   - 5/29 on lisinopril 5mg   - 5/29 added extra 5mg lisinopril 5mg nightly  - 5/30 onwards c/w lisinopril 10mg nightly   - discontinued toprol 25mg daily due to bradycardia 5/30   - Monitor BP adjust medications as necessary     #Alcohol Use Disorder  - Patient reports that patient drinks approx 4-5 glasses of wine per day, has been drinking this way "for years"  - folic acid and thiamine supplementation    #diarrhea - improved  - possible related to ETOH w/drawl  - spoke with son - this is a chronic condition at home (patient with poor memory - poor historian)  -loperamide prn  -low lactose diet  - monitor  - can f/u as an outpatient    #Long-term memory impairment with confabulation and poor insight c/w Wernicke's Encephalopathy  - c/w Donepezil 10 mg qhs  - continue thiamine  - B12 and TSH WNL    #Depression  - c/w Lexapro 30 mg qd    #Asthma  - c/w Montelukast 10 mg qd      -Pain control: Tylenol prn     -Skin:  No active issues at this time    Diet, DASH/TLC:   Sodium & Cholesterol Restricted  Lactose Restricted (Milk Sugar Intoler.) (05-26-24 @ 10:32) [Active]           Precautions / PROPHYLAXIS:      - Falls      - DVT prophylaxis: lovenox

## 2024-05-31 NOTE — PROGRESS NOTE ADULT - ATTENDING COMMENTS
Patient seen and examined with the resident. We discussed the case. I have directed the care. I edited the note. The patient requires acute rehab with 3 hours of daily therapies at least 5 out of 7 days and close physiatry follow up.  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  - Trauma workup on admission unremarkable, no fractures  - EEG : Global focal slowing; no epileptic activity seen; non specific   -Echo pending - can be done on rehab per EPS and Neuro  -s/p ILR 5/24   - c/w ASA and plavix for 3 weeks (started 5/19; Plavix end date 6/10) and then ASA 81mg only  -c/w lipitor 80mg   -PT/OT/SLP eval and treat   -neuropsych consult    #Orthostasis - improving   - Increasing BUN and skin tenting c/w hypovolemia 5/26  - son states that patient does not drink much  - given IVF 5/26 and asymptomatic 5/27.   - encourage PO fluids and monitor    # CAD s/p CABG  - c/w lipitor     #Essential Hypertension   - inpt bp goal <140/90 - above goal   - 5/29 on lisinopril 5mg   - 5/29 added extra 5mg lisinopril 5mg nightly  - 5/30 onwards c/w lisinopril 10mg nightly   - discontinued toprol 25mg daily due to bradycardia 5/30   - Monitor BP adjust medications as necessary     #Alcohol Use Disorder  - Patient reports that patient drinks approx 4-5 glasses of wine per day, has been drinking this way "for years"  - folic acid and thiamine supplementation    #diarrhea - improved  - possible related to ETOH w/drawl  - spoke with son - this is a chronic condition at home (patient with poor memory - poor historian)  -loperamide prn  -low lactose diet  - monitor  - can f/u as an outpatient    #Long-term memory impairment with confabulation and poor insight c/w Wernicke's Encephalopathy  - c/w Donepezil 10 mg qhs  - continue thiamine  - B12 and TSH WNL    #Depression  - c/w Lexapro 30 mg qd    #Asthma  - c/w Montelukast 10 mg qd      -Pain control: Tylenol prn     -Skin:  No active issues at this time    Diet, DASH/TLC:   Sodium & Cholesterol Restricted  Lactose Restricted (Milk Sugar Intoler.) (05-26-24 @ 10:32) [Active]

## 2024-05-31 NOTE — PROGRESS NOTE ADULT - SUBJECTIVE AND OBJECTIVE BOX
Patient is a 88y old  Female who presents with a chief complaint of Stroke with right hemiparesis (dominant) (24 May 2024 10:14)    HPI:  88y Female with PMHx of CAD status post CABG, alcohol use disorder, asthma presented to ED initially on 5/19  with complaints of sudden onset right sided weakness beginning last night at 10pm. pt woke up with generalized weakness that was nonlocalized which caused her to feel weak while standing. Pt endorses fall last week in which she hit her head slightly. Stroke code called in ED. /91 in triage. CTH negative, CTP negative, CTA head and neck showing mild stenosis. MRI showed acute infarct of the left corona radiata/putamen.  Pt was started on DAPT and statin, and pt was discharged 5/21 per pt/family wishes with outpatient follow up for TTE and ILR.     On 5/22 pt presented to ED with generalized weakness s/p two falls at home. Son stated that [facility] was at the patient's home evaluating her for home PT/OT when they noticed that patient was unsteady on her feet, and while speaking to son on the phone, Patient fell from standing position onto her knees onto a carpeted floor, no head injury or LOC. Decision was made to call EMS; when EMS arrived and was beginning transport into [facility], patient once again fell from standing position onto her knees, no head injury or LOC. BP in ED was  193/76 and given IV labetalol. Trauma workup unremarkable.  EPS saw patient, plan  for ILR and TTE, which they said can be done on the rehab unit. Once medically stabilized patient considered a good candidate for acute rehab.     PMR team evaluated the patient and deemed her appropriate for acute rehab 2/2 decline in function with multiple comorbidities and ability to tolerate and benefit for 3 hours of therapy daily at least 5 days a week.   PLOF: patient independent with ADLs and no AD for ambulation.   Admission LOF: Matt for bed mobility and transfers, ambulates 50ft with RW CGA, Matt fo rUBD, modA for LBD, dependent for toileting  LS: Pt lives alone in private house with 1 step to enter. Has an attic with 10 steps to enter. Of note, the patient admits to drinking 4-5 glasses of wine daily, and reportedly was drinking at home after her discharge from the hospital. At this point, she does not remember being home after her stroke admission.    (24 May 2024 10:14)    TODAY'S SUBJECTIVE & REVIEW OF SYMPTOMS:  Patient seen and evaluated at bedside this AM. No overnight events. participating in therapies, tolerating diet. voiding spontaneously. vitals reviewed     Constitutional:    [ x  ] WNL           [   ] poor appetite   [   ] insomnia   [   ] tired   Cardio:                [x   ] WNL           [   ] CP   [   ] MISTRY   [   ] palpitations    [   ] weak after standing in PT           Resp:                   [ x  ] WNL           [   ] SOB   [   ] cough   [   ] wheezing   GI:                        [   ] WNL           [   ] constipation   [   x] diarrhea (family reports chronic) [   ] abdominal pain   [   ] nausea   [   ] emesis                                :                      [  x ] WNL           [   ] URIAS  [   ] dysuria   [   ] difficulty voiding             Endo:                   [  x ] WNL          [   ] polyuria   [   ] temperature intolerance                 Skin:                     [  x ] WNL          [   ] pain   [   ] wound   [   ] rash   MSK:                    [ x  ] WNL          [   ] muscle pain   [   ] joint pain/ stiffness   [   ] muscle tenderness   [   ] swelling   Neuro:                 [   ] WNL          [   ] HA   [   ] change in vision   [   ] tremor   [x   ] weakness   [   ]dysphagia              Cognitive:           [   ] WNL           [  x ]confusion    (patient is confused and a poor historian, though not aware of her deficits)   Psych:                  [ x  ] WNL           [   ] hallucinations   [   ]agitation   [   ] delusion   [   ]depression    PHYSICAL EXAM  Vital Signs Last 24 Hrs  T(C): 36.7 (31 May 2024 05:39), Max: 36.8 (30 May 2024 13:13)  T(F): 98.1 (31 May 2024 05:39), Max: 98.3 (30 May 2024 13:13)  HR: 55 (31 May 2024 05:39) (52 - 59)  BP: 115/57 (31 May 2024 05:39) (115/57 - 133/74)  BP(mean): 76 (31 May 2024 05:39) (76 - 93)  RR: 19 (31 May 2024 05:39) (18 - 19)  SpO2: 98% (30 May 2024 21:47) (98% - 98%)    Parameters below as of 30 May 2024 21:47  Patient On (Oxygen Delivery Method): room air        General:[  x ] NAD, Resting Comfortable,   [   ] other:                                HEENT: [x   ] NC/AT, EOMI, PERRL , Normal Conjunctivae,   [   ] other:  Cardio: [x   ] RRR, no murmer,   [   ] other:                              Pulm: [  x ] No Respiratory Distress,  Lungs CTAB,   [   ] other:                       Abdomen: [  x ]ND/NT, Soft,   [   ] other:    : [  x ] NO URIAS CATHETER, [   ] URIAS CATHETER- no meatal tear, no discharge, [   ] other:                                            MSK: [  x ] No joint swelling, Full ROM,   [   ] other:                                         Ext: [ x  ]No C/C/E, No calf tenderness,   [   ]other:    Skin: [ x  ]intact,   [   ] other:  significant tenting of skin back of hand                                                                 Neurological Examination:  Cognitive: [    ] AAO x 3,   [x    ]  other: AOx2, not to year. Poor historian.                                                                  Attention:  [  x  ] intact,   [    ]  other:  able to follow 2 step commands and spell 'WORLD" backwards                          Memory: [    ] intact,    [ x   ]  other: 1/3 5 min. recall, 2/3 word recall with cues. Poor historian, poor insight. Confabulates.  Mood/Affect: [   x ] wnl,    [    ]  other:                                                                             Communication: [    ]Fluent, no dysarthria, following commands:  [   x ] other: slow, hesitant speech, fluent, comprehension and repetition intact, decreased word finding.  CN II - XII:  [   x ] intact,  [    ] other:                                                                                        Motor:   RIGHT UE: [   ] WNL,  [   x] other: Shoulder abduction, EF/EF 4/5 hand  and wrist extension 5/5  MEDICATIONS  (STANDING):  aspirin enteric coated 81 milliGRAM(s) Oral daily  atorvastatin 80 milliGRAM(s) Oral at bedtime  chlorhexidine 2% Cloths 1 Application(s) Topical <User Schedule>  clopidogrel Tablet 75 milliGRAM(s) Oral daily  donepezil 10 milliGRAM(s) Oral at bedtime  enoxaparin Injectable 40 milliGRAM(s) SubCutaneous every 24 hours  escitalopram 30 milliGRAM(s) Oral daily  folic acid 1 milliGRAM(s) Oral daily  lisinopril 5 milliGRAM(s) Oral at bedtime  montelukast 10 milliGRAM(s) Oral daily  pantoprazole    Tablet 40 milliGRAM(s) Oral before breakfast  sodium chloride 0.9%. 1000 milliLiter(s) (75 mL/Hr) IV Continuous <Continuous>  thiamine 100 milliGRAM(s) Oral daily    MEDICATIONS  (PRN):  acetaminophen     Tablet .. 650 milliGRAM(s) Oral every 6 hours PRN Temp greater or equal to 38C (100.4F), Mild Pain (1 - 3), Moderate Pain (4 - 6), Severe Pain (7 - 10)  loperamide 2 milliGRAM(s) Oral two times a day PRN Diarrhea  LORazepam     Tablet 1 milliGRAM(s) Oral every 2 hours PRN CIWA-Ar score increase by 2 points and a total score of 7 or less  melatonin 3 milliGRAM(s) Oral at bedtime PRN Insomnia  LEFT    UE: [x   ] WNL,  [   ] other:  RIGHT LE: [   ] WNL,  [ x  ] other: HF 4/5, DR 4/5  LEFT    LE: [ x  ] WNL,  [   ] other:    Tone: [x    ] wnl,   [    ]  other:  DTRs: [ x  ]symmetric, [   ] other:  Coordination:   [   x ] intact,   [ x   ] other: decreased proprioception on RLE                                                                          Sensory: [     ] Intact to light touch,   [  x  ] other: impaired kinesthetic awareness RUE, decreased proprioception right great toe > right hand      RECENT LABS/IMAGING                          13.1   5.28  )-----------( 234      ( 27 May 2024 05:33 )             39.4     05-27    139  |  109  |  22<H>  ----------------------------<  91  4.6   |  16<L>  |  0.9    Ca    8.8      27 May 2024 05:33  Mg     2.1     05-27    TPro  6.3  /  Alb  3.9  /  TBili  0.6  /  DBili  x   /  AST  33  /  ALT  30  /  AlkPhos  69  05-27

## 2024-06-01 RX ADMIN — PANTOPRAZOLE SODIUM 40 MILLIGRAM(S): 20 TABLET, DELAYED RELEASE ORAL at 05:48

## 2024-06-01 RX ADMIN — ESCITALOPRAM OXALATE 30 MILLIGRAM(S): 10 TABLET, FILM COATED ORAL at 12:35

## 2024-06-01 RX ADMIN — DONEPEZIL HYDROCHLORIDE 10 MILLIGRAM(S): 10 TABLET, FILM COATED ORAL at 22:24

## 2024-06-01 RX ADMIN — ENOXAPARIN SODIUM 40 MILLIGRAM(S): 100 INJECTION SUBCUTANEOUS at 05:46

## 2024-06-01 RX ADMIN — ATORVASTATIN CALCIUM 80 MILLIGRAM(S): 80 TABLET, FILM COATED ORAL at 22:24

## 2024-06-01 RX ADMIN — Medication 1 MILLIGRAM(S): at 12:35

## 2024-06-01 RX ADMIN — MONTELUKAST 10 MILLIGRAM(S): 4 TABLET, CHEWABLE ORAL at 12:35

## 2024-06-01 RX ADMIN — Medication 81 MILLIGRAM(S): at 12:40

## 2024-06-01 RX ADMIN — CHLORHEXIDINE GLUCONATE 1 APPLICATION(S): 213 SOLUTION TOPICAL at 05:48

## 2024-06-01 RX ADMIN — CLOPIDOGREL BISULFATE 75 MILLIGRAM(S): 75 TABLET, FILM COATED ORAL at 12:36

## 2024-06-01 RX ADMIN — Medication 100 MILLIGRAM(S): at 12:35

## 2024-06-01 RX ADMIN — LISINOPRIL 10 MILLIGRAM(S): 2.5 TABLET ORAL at 22:24

## 2024-06-01 NOTE — PROGRESS NOTE ADULT - ATTENDING COMMENTS
Rehab of decline in function. Patient seen and examined with the resident. The treatment plan discussed. Agree with the above.

## 2024-06-01 NOTE — PROGRESS NOTE ADULT - SUBJECTIVE AND OBJECTIVE BOX
Patient is a 88y old  Female who presents with a chief complaint of Stroke with right hemiparesis (dominant) (24 May 2024 10:14)    HPI:  88y Female with PMHx of CAD status post CABG, alcohol use disorder, asthma presented to ED initially on 5/19  with complaints of sudden onset right sided weakness beginning last night at 10pm. pt woke up with generalized weakness that was nonlocalized which caused her to feel weak while standing. Pt endorses fall last week in which she hit her head slightly. Stroke code called in ED. /91 in triage. CTH negative, CTP negative, CTA head and neck showing mild stenosis. MRI showed acute infarct of the left corona radiata/putamen.  Pt was started on DAPT and statin, and pt was discharged 5/21 per pt/family wishes with outpatient follow up for TTE and ILR.     On 5/22 pt presented to ED with generalized weakness s/p two falls at home. Son stated that [facility] was at the patient's home evaluating her for home PT/OT when they noticed that patient was unsteady on her feet, and while speaking to son on the phone, Patient fell from standing position onto her knees onto a carpeted floor, no head injury or LOC. Decision was made to call EMS; when EMS arrived and was beginning transport into [facility] patient once again fell from standing position onto her knees, no head injury or LOC. BP in ED was  193/76 and given IV labetalol. Trauma workup unremarkable.  EPS saw patient, plan  for ILR and TTE, which they said can be done on the rehab unit. Once medically stabilized patient considered a good candidate for acute rehab.     PMR team evaluated the patient and deemed her appropriate for acute rehab 2/2 decline in function with multiple comorbidities and ability to tolerate and benefit for 3 hours of therapy daily at least 5 days a week.   PLOF: patient independent with ADLs and no AD for ambulation.   Admission LOF: Matt for bed mobility and transfers, ambulates 50ft with RW CGA, Matt fo rUBD, modA for LBD, dependent for toileting  LS: Pt lives alone in private house with 1 step to enter. Has an attic with 10 steps to enter. Of note, the patient admits to drinking 4-5 glasses of wine daily, and reportedly was drinking at home after her discharge from the hospital. At this point, she does not remember being home after her stroke admission.    (24 May 2024 10:14)    TODAY'S SUBJECTIVE & REVIEW OF SYMPTOMS:  Patient seen and evaluated at bedside this AM. No overnight events. doing well, participating in therapies, tolerating diet. voiding spontaneously. vitals reviewed     Constitutional:    [ x  ] WNL           [   ] poor appetite   [   ] insomnia   [   ] tired   Cardio:                [x   ] WNL           [   ] CP   [   ] MISTRY   [   ] palpitations    [   ] weak after standing in PT           Resp:                   [ x  ] WNL           [   ] SOB   [   ] cough   [   ] wheezing   GI:                        [   ] WNL           [   ] constipation   [   x] diarrhea (family reports chronic) [   ] abdominal pain   [   ] nausea   [   ] emesis                                :                      [  x ] WNL           [   ] URIAS  [   ] dysuria   [   ] difficulty voiding             Endo:                   [  x ] WNL          [   ] polyuria   [   ] temperature intolerance                 Skin:                     [  x ] WNL          [   ] pain   [   ] wound   [   ] rash   MSK:                    [ x  ] WNL          [   ] muscle pain   [   ] joint pain/ stiffness   [   ] muscle tenderness   [   ] swelling   Neuro:                 [   ] WNL          [   ] HA   [   ] change in vision   [   ] tremor   [x   ] weakness   [   ]dysphagia              Cognitive:           [   ] WNL           [  x ]confusion    (patient is confused and a poor historian, though not aware of her deficits)   Psych:                  [ x  ] WNL           [   ] hallucinations   [   ]agitation   [   ] delusion   [   ]depression    PHYSICAL EXAM  Vital Signs Last 24 Hrs  T(C): 36.7 (01 Jun 2024 04:46), Max: 36.7 (31 May 2024 14:42)  T(F): 98 (01 Jun 2024 04:46), Max: 98 (31 May 2024 14:42)  HR: 53 (01 Jun 2024 04:46) (52 - 65)  BP: 122/65 (01 Jun 2024 04:46) (118/64 - 123/73)  BP(mean): 82 (31 May 2024 21:47) (82 - 82)  RR: 18 (01 Jun 2024 04:46) (18 - 18)  SpO2: --            General:[  x ] NAD, Resting Comfortable,   [   ] other:                                HEENT: [x   ] NC/AT, EOMI, PERRL , Normal Conjunctivae,   [   ] other:  Cardio: [x   ] RRR, no murmer,   [   ] other:                              Pulm: [  x ] No Respiratory Distress,  Lungs CTAB,   [   ] other:                       Abdomen: [  x ]ND/NT, Soft,   [   ] other:    : [  x ] NO URIAS CATHETER, [   ] URIAS CATHETER- no meatal tear, no discharge, [   ] other:                                            MSK: [  x ] No joint swelling, Full ROM,   [   ] other:                                         Ext: [ x  ]No C/C/E, No calf tenderness,   [   ]other:    Skin: [ x  ]intact,   [   ] other:  significant tenting of skin back of hand                                                                 Neurological Examination:  Cognitive: [    ] AAO x 3,   [x    ]  other: AOx2, not to year. Poor historian.                                                                  Attention:  [  x  ] intact,   [    ]  other:  able to follow 2 step commands and spell 'WORLD" backwards                          Memory: [    ] intact,    [ x   ]  other: 1/3 5 min. recall, 2/3 word recall with cues. Poor historian, poor insight. Confabulates.  Mood/Affect: [   x ] wnl,    [    ]  other:                                                                             Communication: [    ]Fluent, no dysarthria, following commands:  [   x ] other: slow, hesitant speech, fluent, comprehension and repetition intact, decreased word finding.  CN II - XII:  [   x ] intact,  [    ] other:                                                                                        Motor:   RIGHT UE: [   ] WNL,  [   x] other: Shoulder abduction, EF/EF 4/5 hand  and wrist extension 5/5  MEDICATIONS  (STANDING):  aspirin enteric coated 81 milliGRAM(s) Oral daily  atorvastatin 80 milliGRAM(s) Oral at bedtime  chlorhexidine 2% Cloths 1 Application(s) Topical <User Schedule>  clopidogrel Tablet 75 milliGRAM(s) Oral daily  donepezil 10 milliGRAM(s) Oral at bedtime  enoxaparin Injectable 40 milliGRAM(s) SubCutaneous every 24 hours  escitalopram 30 milliGRAM(s) Oral daily  folic acid 1 milliGRAM(s) Oral daily  lisinopril 5 milliGRAM(s) Oral at bedtime  montelukast 10 milliGRAM(s) Oral daily  pantoprazole    Tablet 40 milliGRAM(s) Oral before breakfast  sodium chloride 0.9%. 1000 milliLiter(s) (75 mL/Hr) IV Continuous <Continuous>  thiamine 100 milliGRAM(s) Oral daily    MEDICATIONS  (PRN):  acetaminophen     Tablet .. 650 milliGRAM(s) Oral every 6 hours PRN Temp greater or equal to 38C (100.4F), Mild Pain (1 - 3), Moderate Pain (4 - 6), Severe Pain (7 - 10)  loperamide 2 milliGRAM(s) Oral two times a day PRN Diarrhea  LORazepam     Tablet 1 milliGRAM(s) Oral every 2 hours PRN CIWA-Ar score increase by 2 points and a total score of 7 or less  melatonin 3 milliGRAM(s) Oral at bedtime PRN Insomnia  LEFT    UE: [x   ] WNL,  [   ] other:  RIGHT LE: [   ] WNL,  [ x  ] other: HF 4/5, DR 4/5  LEFT    LE: [ x  ] WNL,  [   ] other:    Tone: [x    ] wnl,   [    ]  other:  DTRs: [ x  ]symmetric, [   ] other:  Coordination:   [   x ] intact,   [ x   ] other: decreased proprioception on RLE                                                                          Sensory: [     ] Intact to light touch,   [  x  ] other: impaired kinesthetic awareness RUE, decreased proprioception right great toe > right hand      RECENT LABS/IMAGING                          13.1   5.28  )-----------( 234      ( 27 May 2024 05:33 )             39.4     05-27    139  |  109  |  22<H>  ----------------------------<  91  4.6   |  16<L>  |  0.9    Ca    8.8      27 May 2024 05:33  Mg     2.1     05-27    TPro  6.3  /  Alb  3.9  /  TBili  0.6  /  DBili  x   /  AST  33  /  ALT  30  /  AlkPhos  69  05-27

## 2024-06-02 RX ADMIN — ENOXAPARIN SODIUM 40 MILLIGRAM(S): 100 INJECTION SUBCUTANEOUS at 06:32

## 2024-06-02 RX ADMIN — Medication 81 MILLIGRAM(S): at 15:18

## 2024-06-02 RX ADMIN — LISINOPRIL 10 MILLIGRAM(S): 2.5 TABLET ORAL at 21:28

## 2024-06-02 RX ADMIN — ATORVASTATIN CALCIUM 80 MILLIGRAM(S): 80 TABLET, FILM COATED ORAL at 21:28

## 2024-06-02 RX ADMIN — MONTELUKAST 10 MILLIGRAM(S): 4 TABLET, CHEWABLE ORAL at 11:37

## 2024-06-02 RX ADMIN — PANTOPRAZOLE SODIUM 40 MILLIGRAM(S): 20 TABLET, DELAYED RELEASE ORAL at 06:32

## 2024-06-02 RX ADMIN — ESCITALOPRAM OXALATE 30 MILLIGRAM(S): 10 TABLET, FILM COATED ORAL at 11:38

## 2024-06-02 RX ADMIN — DONEPEZIL HYDROCHLORIDE 10 MILLIGRAM(S): 10 TABLET, FILM COATED ORAL at 21:28

## 2024-06-02 RX ADMIN — Medication 1 MILLIGRAM(S): at 11:37

## 2024-06-02 RX ADMIN — CLOPIDOGREL BISULFATE 75 MILLIGRAM(S): 75 TABLET, FILM COATED ORAL at 11:38

## 2024-06-02 RX ADMIN — Medication 100 MILLIGRAM(S): at 11:37

## 2024-06-02 RX ADMIN — CHLORHEXIDINE GLUCONATE 1 APPLICATION(S): 213 SOLUTION TOPICAL at 06:32

## 2024-06-02 NOTE — PROGRESS NOTE ADULT - ATTENDING COMMENTS
Rehab for decline in function. Patient seen and examined with the resident. The treatment plan discussed. I read and edited the note and agree with the findings.  ASSESSMENT/PLAN  87 y/o F with PMHx of CABG, dementia, asthma, alcohol use disorder, and recent CVA with right sided weakness (on 5/19/2024) that presented to the ED on 5/22 with generalized weakness s/p two falls at home.    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  - Trauma workup on admission unremarkable, no fractures  - EEG : Global focal slowing; no epileptic activity seen; non specific   -Echo pending - can be done on rehab per EPS and Neuro  -s/p ILR 5/24   - c/w ASA and plavix for 3 weeks (started 5/19; Plavix end date 6/10) and then ASA 81mg only  -c/w lipitor 80mg   -PT/OT/SLP eval and treat   -neuropsych consult    #Orthostasis - improving   - Increasing BUN and skin tenting c/w hypovolemia 5/26  - son states that patient does not drink much  - given IVF 5/26 and asymptomatic 5/27.   - encourage PO fluids and monitor    # CAD s/p CABG  - c/w lipitor     #Essential Hypertension   - inpt bp goal <140/90 - above goal   - 5/29 on lisinopril 5mg   - 5/29 added extra 5mg lisinopril 5mg nightly  - 5/30 onwards c/w lisinopril 10mg nightly   - discontinued toprol 25mg daily due to bradycardia 5/30   - Monitor BP adjust medications as necessary     #Alcohol Use Disorder  - Patient reports that patient drinks approx 4-5 glasses of wine per day, has been drinking this way "for years"  - folic acid and thiamine supplementation    #diarrhea - improved  - possible related to ETOH w/drawl  - spoke with son - this is a chronic condition at home (patient with poor memory - poor historian)  -loperamide prn  -low lactose diet  - monitor  - can f/u as an outpatient    #Long-term memory impairment with confabulation and poor insight c/w Wernicke's Encephalopathy  - c/w Donepezil 10 mg qhs  - continue thiamine  - B12 and TSH WNL    #Depression  - c/w Lexapro 30 mg qd    #Asthma  - c/w Montelukast 10 mg qd      -Pain control: Tylenol prn     -Skin:  No active issues at this time    Diet, DASH/TLC:   Sodium & Cholesterol Restricted  Lactose Restricted (Milk Sugar Intoler.) (05-26-24 @ 10:32) [Active]       Precautions / PROPHYLAXIS:    - Falls    - DVT prophylaxis: lovenox

## 2024-06-02 NOTE — PROGRESS NOTE ADULT - SUBJECTIVE AND OBJECTIVE BOX
Patient is a 88y old  Female who presents with a chief complaint of Stroke with right hemiparesis (dominant) (24 May 2024 10:14)    HPI:  88y Female with PMHx of CAD status post CABG, alcohol use disorder, asthma presented to ED initially on 5/19  with complaints of sudden onset right sided weakness beginning last night at 10pm. pt woke up with generalized weakness that was nonlocalized which caused her to feel weak while standing. Pt endorses fall last week in which she hit her head slightly. Stroke code called in ED. /91 in triage. CTH negative, CTP negative, CTA head and neck showing mild stenosis. MRI showed acute infarct of the left corona radiata/putamen.  Pt was started on DAPT and statin, and pt was discharged 5/21 per pt/family wishes with outpatient follow up for TTE and ILR.     On 5/22 pt presented to ED with generalized weakness s/p two falls at home. Son stated that [facility] was at the patient's home evaluating her for home PT/OT when they noticed that patient was unsteady on her feet, and while speaking to son on the phone, Patient fell from standing position onto her knees onto a carpeted floor, no head injury or LOC. Decision was made to call EMS; when EMS arrived and was beginning transport into [facility], patient once again fell from standing position onto her knees, no head injury or LOC. BP in ED was  193/76 and given IV labetalol. Trauma workup unremarkable.  EPS saw patient, plan  for ILR and TTE, which they said can be done on the rehab unit. Once medically stabilized patient considered a good candidate for acute rehab.     PMR team evaluated the patient and deemed her appropriate for acute rehab 2/2 decline in function with multiple comorbidities and ability to tolerate and benefit for 3 hours of therapy daily at least 5 days a week.   PLOF: patient independent with ADLs and no AD for ambulation.   Admission LOF: Matt for bed mobility and transfers, ambulates 50ft with RW CGA, Matt fo rUBD, modA for LBD, dependent for toileting  LS: Pt lives alone in private house with 1 step to enter. Has an attic with 10 steps to enter. Of note, the patient admits to drinking 4-5 glasses of wine daily, and reportedly was drinking at home after her discharge from the hospital. At this point, she does not remember being home after her stroke admission.    (24 May 2024 10:14)    TODAY'S SUBJECTIVE & REVIEW OF SYMPTOMS:  Patient seen this AM. No overnight events. doing well, participating in therapies, tolerating diet. voiding spontaneously. vitals reviewed  Planned for discharge tomorrow to [facility]      Constitutional:    [ x  ] WNL           [   ] poor appetite   [   ] insomnia   [   ] tired   Cardio:                [x   ] WNL           [   ] CP   [   ] MISTRY   [   ] palpitations    [   ] weak after standing in PT           Resp:                   [ x  ] WNL           [   ] SOB   [   ] cough   [   ] wheezing   GI:                        [   ] WNL           [   ] constipation   [   x] diarrhea (family reports chronic) [   ] abdominal pain   [   ] nausea   [   ] emesis                                :                      [  x ] WNL           [   ] URIAS  [   ] dysuria   [   ] difficulty voiding             Endo:                   [  x ] WNL          [   ] polyuria   [   ] temperature intolerance                 Skin:                     [  x ] WNL          [   ] pain   [   ] wound   [   ] rash   MSK:                    [ x  ] WNL          [   ] muscle pain   [   ] joint pain/ stiffness   [   ] muscle tenderness   [   ] swelling   Neuro:                 [   ] WNL          [   ] HA   [   ] change in vision   [   ] tremor   [x   ] weakness   [   ]dysphagia              Cognitive:           [   ] WNL           [  x ]confusion    (patient is confused and a poor historian, though not aware of her deficits)   Psych:                  [ x  ] WNL           [   ] hallucinations   [   ]agitation   [   ] delusion   [   ]depression    PHYSICAL EXAM  Vital Signs Last 24 Hrs  T(C): 36.7 (02 Jun 2024 05:47), Max: 36.9 (01 Jun 2024 13:42)  T(F): 98 (02 Jun 2024 05:47), Max: 98.4 (01 Jun 2024 13:42)  HR: 56 (02 Jun 2024 05:47) (56 - 87)  BP: 121/59 (02 Jun 2024 05:47) (121/59 - 135/68)  BP(mean): --  RR: 18 (02 Jun 2024 05:47) (18 - 18)  SpO2: 98% (02 Jun 2024 05:47) (98% - 98%)    General:[  x ] NAD, Resting Comfortable,   [   ] other:                                HEENT: [x   ] NC/AT, EOMI, PERRL , Normal Conjunctivae,   [   ] other:  Cardio: [x   ] RRR, no murmer,   [   ] other:                              Pulm: [  x ] No Respiratory Distress,  Lungs CTAB,   [   ] other:                       Abdomen: [  x ]ND/NT, Soft,   [   ] other:    : [  x ] NO URIAS CATHETER, [   ] URIAS CATHETER- no meatal tear, no discharge, [   ] other:                                            MSK: [  x ] No joint swelling, Full ROM,   [   ] other:                                         Ext: [ x  ]No C/C/E, No calf tenderness,   [   ]other:    Skin: [ x  ]intact,   [   ] other:  significant tenting of skin back of hand                                                                 Neurological Examination:  Cognitive: [    ] AAO x 3,   [x    ]  other: AOx2, not to year. Poor historian.                                                                  Attention:  [  x  ] intact,   [    ]  other:  able to follow 2 step commands and spell 'WORLD" backwards                          Memory: [    ] intact,    [ x   ]  other: 1/3 5 min. recall, 2/3 word recall with cues. Poor historian, poor insight. Confabulates.  Mood/Affect: [   x ] wnl,    [    ]  other:                                                                             Communication: [    ]Fluent, no dysarthria, following commands:  [   x ] other: slow, hesitant speech, fluent, comprehension and repetition intact, decreased word finding.  CN II - XII:  [   x ] intact,  [    ] other:                                                                                        Motor:   RIGHT UE: [   ] WNL,  [   x] other: Shoulder abduction, EF/EF 4/5 hand  and wrist extension 5/5  MEDICATIONS  (STANDING):  aspirin enteric coated 81 milliGRAM(s) Oral daily  atorvastatin 80 milliGRAM(s) Oral at bedtime  chlorhexidine 2% Cloths 1 Application(s) Topical <User Schedule>  clopidogrel Tablet 75 milliGRAM(s) Oral daily  donepezil 10 milliGRAM(s) Oral at bedtime  enoxaparin Injectable 40 milliGRAM(s) SubCutaneous every 24 hours  escitalopram 30 milliGRAM(s) Oral daily  folic acid 1 milliGRAM(s) Oral daily  lisinopril 5 milliGRAM(s) Oral at bedtime  montelukast 10 milliGRAM(s) Oral daily  pantoprazole    Tablet 40 milliGRAM(s) Oral before breakfast  sodium chloride 0.9%. 1000 milliLiter(s) (75 mL/Hr) IV Continuous <Continuous>  thiamine 100 milliGRAM(s) Oral daily    MEDICATIONS  (PRN):  acetaminophen     Tablet .. 650 milliGRAM(s) Oral every 6 hours PRN Temp greater or equal to 38C (100.4F), Mild Pain (1 - 3), Moderate Pain (4 - 6), Severe Pain (7 - 10)  loperamide 2 milliGRAM(s) Oral two times a day PRN Diarrhea  LORazepam     Tablet 1 milliGRAM(s) Oral every 2 hours PRN CIWA-Ar score increase by 2 points and a total score of 7 or less  melatonin 3 milliGRAM(s) Oral at bedtime PRN Insomnia  LEFT    UE: [x   ] WNL,  [   ] other:  RIGHT LE: [   ] WNL,  [ x  ] other: HF 4/5, DR 4/5  LEFT    LE: [ x  ] WNL,  [   ] other:    Tone: [x    ] wnl,   [    ]  other:  DTRs: [ x  ]symmetric, [   ] other:  Coordination:   [   x ] intact,   [ x   ] other: decreased proprioception on RLE                                                                          Sensory: [     ] Intact to light touch,   [  x  ] other: impaired kinesthetic awareness RUE, decreased proprioception right great toe > right hand      RECENT LABS/IMAGING                          13.1   5.28  )-----------( 234      ( 27 May 2024 05:33 )             39.4     05-27    139  |  109  |  22<H>  ----------------------------<  91  4.6   |  16<L>  |  0.9    Ca    8.8      27 May 2024 05:33  Mg     2.1     05-27    TPro  6.3  /  Alb  3.9  /  TBili  0.6  /  DBili  x   /  AST  33  /  ALT  30  /  AlkPhos  69  05-27       Patient is a 88 y old  Female who presents with a chief complaint of Stroke with right hemiparesis (dominant) (24 May 2024 10:14)    HPI:  88y Female with PMHx of CAD status post CABG, alcohol use disorder, asthma presented to ED initially on 5/19  with complaints of sudden onset right sided weakness beginning last night at 10pm. pt woke up with generalized weakness that was nonlocalized which caused her to feel weak while standing. Pt endorses fall last week in which she hit her head slightly. Stroke code called in ED. /91 in triage. CTH negative, CTP negative, CTA head and neck showing mild stenosis. MRI showed acute infarct of the left corona radiata/putamen.  Pt was started on DAPT and statin, and pt was discharged 5/21 per pt/family wishes with outpatient follow up for TTE and ILR.     On 5/22 pt presented to ED with generalized weakness s/p two falls at home. Son stated that [facility] was at the patient's home evaluating her for home PT/OT when they noticed that patient was unsteady on her feet, and while speaking to son on the phone, Patient fell from standing position onto her knees onto a carpeted floor, no head injury or LOC. Decision was made to call EMS; when EMS arrived and was beginning transport into [facility], patient once again fell from standing position onto her knees, no head injury or LOC. BP in ED was  193/76 and given IV labetalol. Trauma workup unremarkable.  EPS saw patient, plan  for ILR and TTE, which they said can be done on the rehab unit. Once medically stabilized patient considered a good candidate for acute rehab.     PMR team evaluated the patient and deemed her appropriate for acute rehab 2/2 decline in function with multiple comorbidities and ability to tolerate and benefit for 3 hours of therapy daily at least 5 days a week.   PLOF: patient independent with ADLs and no AD for ambulation.   Admission LOF: Matt for bed mobility and transfers, ambulates 50ft with RW CGA, Matt fo rUBD, modA for LBD, dependent for toileting  LS: Pt lives alone in private house with 1 step to enter. Has an attic with 10 steps to enter. Of note, the patient admits to drinking 4-5 glasses of wine daily, and reportedly was drinking at home after her discharge from the hospital. At this point, she does not remember being home after her stroke admission.    (24 May 2024 10:14)    TODAY'S SUBJECTIVE & REVIEW OF SYMPTOMS:  Patient seen this AM. No overnight events. doing well, participating in therapies, tolerating diet. voiding spontaneously. vitals reviewed  Planned for discharge tomorrow to [facility]      Constitutional:    [ x  ] WNL           [   ] poor appetite   [   ] insomnia   [   ] tired   Cardio:                [x   ] WNL           [   ] CP   [   ] MISTRY   [   ] palpitations    [   ] weak after standing in PT           Resp:                   [ x  ] WNL           [   ] SOB   [   ] cough   [   ] wheezing   GI:                        [   ] WNL           [   ] constipation   [   x] diarrhea (family reports chronic) [   ] abdominal pain   [   ] nausea   [   ] emesis                                :                      [  x ] WNL           [   ] URIAS  [   ] dysuria   [   ] difficulty voiding             Endo:                   [  x ] WNL          [   ] polyuria   [   ] temperature intolerance                 Skin:                     [  x ] WNL          [   ] pain   [   ] wound   [   ] rash   MSK:                    [ x  ] WNL          [   ] muscle pain   [   ] joint pain/ stiffness   [   ] muscle tenderness   [   ] swelling   Neuro:                 [   ] WNL          [   ] HA   [   ] change in vision   [   ] tremor   [x   ] weakness   [   ]dysphagia              Cognitive:           [   ] WNL           [  x ]confusion    (patient is confused and a poor historian, though not aware of her deficits)   Psych:                  [ x  ] WNL           [   ] hallucinations   [   ]agitation   [   ] delusion   [   ]depression    PHYSICAL EXAM  Vital Signs Last 24 Hrs  T(C): 36.7 (02 Jun 2024 05:47), Max: 36.9 (01 Jun 2024 13:42)  T(F): 98 (02 Jun 2024 05:47), Max: 98.4 (01 Jun 2024 13:42)  HR: 56 (02 Jun 2024 05:47) (56 - 87)  BP: 121/59 (02 Jun 2024 05:47) (121/59 - 135/68)  BP(mean): --  RR: 18 (02 Jun 2024 05:47) (18 - 18)  SpO2: 98% (02 Jun 2024 05:47) (98% - 98%)    General:[  x ] NAD, Resting Comfortable,   [   ] other:                                HEENT: [x   ] NC/AT, EOMI, PERRL , Normal Conjunctivae,   [   ] other:  Cardio: [x   ] RRR, no murmer,   [   ] other:                              Pulm: [  x ] No Respiratory Distress,  Lungs CTAB,   [   ] other:                       Abdomen: [  x ]ND/NT, Soft,   [   ] other:    : [  x ] NO URIAS CATHETER, [   ] URIAS CATHETER- no meatal tear, no discharge, [   ] other:                                            MSK: [  x ] No joint swelling, Full ROM,   [   ] other:                                         Ext: [ x  ]No C/C/E, No calf tenderness,   [   ]other:    Skin: [ x  ]intact,   [   ] other:  significant tenting of skin back of hand                                                                 Neurological Examination:  Cognitive: [    ] AAO x 3,   [x    ]  other: AOx2, not to year. Poor historian.                                                                  Attention:  [  x  ] intact,   [    ]  other:  able to follow 2 step commands and spell 'WORLD" backwards                          Memory: [    ] intact,    [ x   ]  other: 1/3 5 min. recall, 2/3 word recall with cues. Poor historian, poor insight. Confabulates.  Mood/Affect: [   x ] wnl,    [    ]  other:                                                                             Communication: [    ]Fluent, no dysarthria, following commands:  [   x ] other: slow, hesitant speech, fluent, comprehension and repetition intact, decreased word finding.  CN II - XII:  [   x ] intact,  [    ] other:                                                                                        Motor:   RIGHT UE: [   ] WNL,  [   x] other: Shoulder abduction, EF/EF 4/5 hand  and wrist extension 5/5  MEDICATIONS  (STANDING):  aspirin enteric coated 81 milliGRAM(s) Oral daily  atorvastatin 80 milliGRAM(s) Oral at bedtime  chlorhexidine 2% Cloths 1 Application(s) Topical <User Schedule>  clopidogrel Tablet 75 milliGRAM(s) Oral daily  donepezil 10 milliGRAM(s) Oral at bedtime  enoxaparin Injectable 40 milliGRAM(s) SubCutaneous every 24 hours  escitalopram 30 milliGRAM(s) Oral daily  folic acid 1 milliGRAM(s) Oral daily  lisinopril 5 milliGRAM(s) Oral at bedtime  montelukast 10 milliGRAM(s) Oral daily  pantoprazole    Tablet 40 milliGRAM(s) Oral before breakfast  sodium chloride 0.9%. 1000 milliLiter(s) (75 mL/Hr) IV Continuous <Continuous>  thiamine 100 milliGRAM(s) Oral daily    MEDICATIONS  (PRN):  acetaminophen     Tablet .. 650 milliGRAM(s) Oral every 6 hours PRN Temp greater or equal to 38C (100.4F), Mild Pain (1 - 3), Moderate Pain (4 - 6), Severe Pain (7 - 10)  loperamide 2 milliGRAM(s) Oral two times a day PRN Diarrhea  LORazepam     Tablet 1 milliGRAM(s) Oral every 2 hours PRN CIWA-Ar score increase by 2 points and a total score of 7 or less  melatonin 3 milliGRAM(s) Oral at bedtime PRN Insomnia  LEFT    UE: [x   ] WNL,  [   ] other:  RIGHT LE: [   ] WNL,  [ x  ] other: HF 4/5, DR 4/5  LEFT    LE: [ x  ] WNL,  [   ] other:    Tone: [x    ] wnl,   [    ]  other:  DTRs: [ x  ]symmetric, [   ] other:  Coordination:   [   x ] intact,   [ x   ] other: decreased proprioception on RLE                                                                          Sensory: [     ] Intact to light touch,   [  x  ] other: impaired kinesthetic awareness RUE, decreased proprioception right great toe > right hand      RECENT LABS/IMAGING                          13.1   5.28  )-----------( 234      ( 27 May 2024 05:33 )             39.4     05-27    139  |  109  |  22<H>  ----------------------------<  91  4.6   |  16<L>  |  0.9    Ca    8.8      27 May 2024 05:33  Mg     2.1     05-27    TPro  6.3  /  Alb  3.9  /  TBili  0.6  /  DBili  x   /  AST  33  /  ALT  30  /  AlkPhos  69  05-27

## 2024-06-02 NOTE — PROGRESS NOTE ADULT - ASSESSMENT
ASSESSMENT/PLAN  89 y/o F with PMHx of CABG, dementia, asthma, alcohol use disorder, and recent CVA with right sided weakness (on 5/19/2024) that presented to the ED on 5/22 with generalized weakness s/p two falls at home.    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  - Trauma workup on admission unremarkable, no fractures  - EEG : Global focal slowing; no epileptic activity seen; non specific   -Echo pending - can be done on rehab per EPS and Neuro  -s/p ILR 5/24   - c/w ASA and plavix for 3 weeks (started 5/19; Plavix end date 6/10) and then ASA 81mg only  -c/w lipitor 80mg   -PT/OT/SLP eval and treat   -neuropsych consult    #Orthostasis - improving   - Increasing BUN and skin tenting c/w hypovolemia 5/26  - son states that patient does not drink much  - given IVF 5/26 and asymptomatic 5/27.   - encourage PO fluids and monitor    # CAD s/p CABG  - c/w lipitor     #Essential Hypertension   - inpt bp goal <140/90 - above goal   - 5/29 on lisinopril 5mg   - 5/29 added extra 5mg lisinopril 5mg nightly  - 5/30 onwards c/w lisinopril 10mg nightly   - discontinued toprol 25mg daily due to bradycardia 5/30   - Monitor BP adjust medications as necessary     #Alcohol Use Disorder  - Patient reports that patient drinks approx 4-5 glasses of wine per day, has been drinking this way "for years"  - folic acid and thiamine supplementation    #diarrhea - improved  - possible related to ETOH w/drawl  - spoke with son - this is a chronic condition at home (patient with poor memory - poor historian)  -loperamide prn  -low lactose diet  - monitor  - can f/u as an outpatient    #Long-term memory impairment with confabulation and poor insight c/w Wernicke's Encephalopathy  - c/w Donepezil 10 mg qhs  - continue thiamine  - B12 and TSH WNL    #Depression  - c/w Lexapro 30 mg qd    #Asthma  - c/w Montelukast 10 mg qd      -Pain control: Tylenol prn     -Skin:  No active issues at this time    Diet, DASH/TLC:   Sodium & Cholesterol Restricted  Lactose Restricted (Milk Sugar Intoler.) (05-26-24 @ 10:32) [Active]       Precautions / PROPHYLAXIS:    - Falls    - DVT prophylaxis: lovenox    ASSESSMENT/PLAN  87 y/o F with PMHx of CABG, dementia, asthma, alcohol use disorder, and recent CVA with right sided weakness (on 5/19/2024) that presented to the ED on 5/22 with generalized weakness s/p two falls at home.    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  - Trauma workup on admission unremarkable, no fractures  - EEG : Global focal slowing; no epileptic activity seen; non specific   -Echo pending - can be done on rehab per EPS and Neuro  -s/p ILR 5/24   - c/w ASA and plavix for 3 weeks (started 5/19; Plavix end date 6/10) and then ASA 81mg only  -c/w lipitor 80mg   -PT/OT/SLP eval and treat   -neuropsych consult    #Orthostasis - improving   - Increasing BUN and skin tenting c/w hypovolemia 5/26  - son states that patient does not drink much  - given IVF 5/26 and asymptomatic 5/27.   - encourage PO fluids and monitor    # CAD s/p CABG  - c/w lipitor     #Essential Hypertension   - inpt bp goal <140/90 - above goal   - 5/29 on lisinopril 5mg   - 5/29 added extra 5mg lisinopril 5mg nightly  - 5/30 onwards c/w lisinopril 10mg nightly   - discontinued toprol 25mg daily due to bradycardia 5/30   - Monitor BP adjust medications as necessary     #Alcohol Use Disorder  - Patient reports that patient drinks approx 4-5 glasses of wine per day, has been drinking this way "for years"  - folic acid and thiamine supplementation    #diarrhea - improved  - possible related to ETOH w/drawl  - spoke with son - this is a chronic condition at home (patient with poor memory - poor historian)  -loperamide prn  -low lactose diet  - monitor  - can f/u as an outpatient    #Long-term memory impairment with confabulation and poor insight c/w Wernicke's Encephalopathy  - c/w Donepezil 10 mg qhs  - continue thiamine  - B12 and TSH WNL    #Depression  - c/w Lexapro 30 mg qd    #Asthma  - c/w Montelukast 10 mg qd      -Pain control: Tylenol prn     -Skin:  No active issues at this time    Diet, DASH/TLC:   Sodium & Cholesterol Restricted  Lactose Restricted (Milk Sugar Intoler.) (05-26-24 @ 10:32) [Active]       Precautions / PROPHYLAXIS:    - Falls    - DVT prophylaxis: lovenox

## 2024-06-03 ENCOUNTER — TRANSCRIPTION ENCOUNTER (OUTPATIENT)
Age: 89
End: 2024-06-03

## 2024-06-03 VITALS
HEART RATE: 78 BPM | RESPIRATION RATE: 18 BRPM | DIASTOLIC BLOOD PRESSURE: 75 MMHG | SYSTOLIC BLOOD PRESSURE: 130 MMHG | OXYGEN SATURATION: 94 % | TEMPERATURE: 99 F

## 2024-06-03 LAB
ALBUMIN SERPL ELPH-MCNC: 4 G/DL — SIGNIFICANT CHANGE UP (ref 3.5–5.2)
ALP SERPL-CCNC: 71 U/L — SIGNIFICANT CHANGE UP (ref 30–115)
ALT FLD-CCNC: 24 U/L — SIGNIFICANT CHANGE UP (ref 0–41)
ANION GAP SERPL CALC-SCNC: 10 MMOL/L — SIGNIFICANT CHANGE UP (ref 7–14)
AST SERPL-CCNC: 22 U/L — SIGNIFICANT CHANGE UP (ref 0–41)
BASOPHILS # BLD AUTO: 0.04 K/UL — SIGNIFICANT CHANGE UP (ref 0–0.2)
BASOPHILS NFR BLD AUTO: 0.7 % — SIGNIFICANT CHANGE UP (ref 0–1)
BILIRUB SERPL-MCNC: 0.6 MG/DL — SIGNIFICANT CHANGE UP (ref 0.2–1.2)
BUN SERPL-MCNC: 7 MG/DL — LOW (ref 10–20)
CALCIUM SERPL-MCNC: 9.4 MG/DL — SIGNIFICANT CHANGE UP (ref 8.4–10.5)
CHLORIDE SERPL-SCNC: 104 MMOL/L — SIGNIFICANT CHANGE UP (ref 98–110)
CO2 SERPL-SCNC: 22 MMOL/L — SIGNIFICANT CHANGE UP (ref 17–32)
CREAT SERPL-MCNC: 0.8 MG/DL — SIGNIFICANT CHANGE UP (ref 0.7–1.5)
EGFR: 72 ML/MIN/1.73M2 — SIGNIFICANT CHANGE UP
EOSINOPHIL # BLD AUTO: 0.26 K/UL — SIGNIFICANT CHANGE UP (ref 0–0.7)
EOSINOPHIL NFR BLD AUTO: 4.7 % — SIGNIFICANT CHANGE UP (ref 0–8)
GLUCOSE SERPL-MCNC: 94 MG/DL — SIGNIFICANT CHANGE UP (ref 70–99)
HCT VFR BLD CALC: 39.6 % — SIGNIFICANT CHANGE UP (ref 37–47)
HGB BLD-MCNC: 13 G/DL — SIGNIFICANT CHANGE UP (ref 12–16)
IMM GRANULOCYTES NFR BLD AUTO: 0.4 % — HIGH (ref 0.1–0.3)
LYMPHOCYTES # BLD AUTO: 1.39 K/UL — SIGNIFICANT CHANGE UP (ref 1.2–3.4)
LYMPHOCYTES # BLD AUTO: 25 % — SIGNIFICANT CHANGE UP (ref 20.5–51.1)
MAGNESIUM SERPL-MCNC: 1.9 MG/DL — SIGNIFICANT CHANGE UP (ref 1.8–2.4)
MCHC RBC-ENTMCNC: 30.5 PG — SIGNIFICANT CHANGE UP (ref 27–31)
MCHC RBC-ENTMCNC: 32.8 G/DL — SIGNIFICANT CHANGE UP (ref 32–37)
MCV RBC AUTO: 93 FL — SIGNIFICANT CHANGE UP (ref 81–99)
MONOCYTES # BLD AUTO: 0.49 K/UL — SIGNIFICANT CHANGE UP (ref 0.1–0.6)
MONOCYTES NFR BLD AUTO: 8.8 % — SIGNIFICANT CHANGE UP (ref 1.7–9.3)
NEUTROPHILS # BLD AUTO: 3.37 K/UL — SIGNIFICANT CHANGE UP (ref 1.4–6.5)
NEUTROPHILS NFR BLD AUTO: 60.4 % — SIGNIFICANT CHANGE UP (ref 42.2–75.2)
NRBC # BLD: 0 /100 WBCS — SIGNIFICANT CHANGE UP (ref 0–0)
PLATELET # BLD AUTO: 339 K/UL — SIGNIFICANT CHANGE UP (ref 130–400)
PMV BLD: 10 FL — SIGNIFICANT CHANGE UP (ref 7.4–10.4)
POTASSIUM SERPL-MCNC: 4.6 MMOL/L — SIGNIFICANT CHANGE UP (ref 3.5–5)
POTASSIUM SERPL-SCNC: 4.6 MMOL/L — SIGNIFICANT CHANGE UP (ref 3.5–5)
PROT SERPL-MCNC: 6.2 G/DL — SIGNIFICANT CHANGE UP (ref 6–8)
RBC # BLD: 4.26 M/UL — SIGNIFICANT CHANGE UP (ref 4.2–5.4)
RBC # FLD: 13.8 % — SIGNIFICANT CHANGE UP (ref 11.5–14.5)
SODIUM SERPL-SCNC: 136 MMOL/L — SIGNIFICANT CHANGE UP (ref 135–146)
WBC # BLD: 5.57 K/UL — SIGNIFICANT CHANGE UP (ref 4.8–10.8)
WBC # FLD AUTO: 5.57 K/UL — SIGNIFICANT CHANGE UP (ref 4.8–10.8)

## 2024-06-03 RX ORDER — DONEPEZIL HYDROCHLORIDE 10 MG/1
1 TABLET, FILM COATED ORAL
Qty: 0 | Refills: 0 | DISCHARGE
Start: 2024-06-03

## 2024-06-03 RX ORDER — DONEPEZIL HYDROCHLORIDE 10 MG/1
1 TABLET, FILM COATED ORAL
Refills: 0 | DISCHARGE

## 2024-06-03 RX ORDER — ACETAMINOPHEN 500 MG
2 TABLET ORAL
Qty: 0 | Refills: 0 | DISCHARGE
Start: 2024-06-03

## 2024-06-03 RX ORDER — MONTELUKAST 4 MG/1
1 TABLET, CHEWABLE ORAL
Qty: 0 | Refills: 0 | DISCHARGE

## 2024-06-03 RX ORDER — ESCITALOPRAM OXALATE 10 MG/1
3 TABLET, FILM COATED ORAL
Qty: 0 | Refills: 0 | DISCHARGE
Start: 2024-06-03

## 2024-06-03 RX ORDER — CLOPIDOGREL BISULFATE 75 MG/1
1 TABLET, FILM COATED ORAL
Qty: 0 | Refills: 0 | DISCHARGE
Start: 2024-06-03

## 2024-06-03 RX ORDER — ATORVASTATIN CALCIUM 80 MG/1
1 TABLET, FILM COATED ORAL
Qty: 0 | Refills: 0 | DISCHARGE
Start: 2024-06-03

## 2024-06-03 RX ORDER — LISINOPRIL 2.5 MG/1
1 TABLET ORAL
Qty: 0 | Refills: 0 | DISCHARGE
Start: 2024-06-03

## 2024-06-03 RX ORDER — PANTOPRAZOLE SODIUM 20 MG/1
1 TABLET, DELAYED RELEASE ORAL
Qty: 0 | Refills: 0 | DISCHARGE
Start: 2024-06-03

## 2024-06-03 RX ORDER — ENOXAPARIN SODIUM 100 MG/ML
40 INJECTION SUBCUTANEOUS
Qty: 0 | Refills: 0 | DISCHARGE
Start: 2024-06-03

## 2024-06-03 RX ORDER — ASPIRIN/CALCIUM CARB/MAGNESIUM 324 MG
1 TABLET ORAL
Qty: 0 | Refills: 0 | DISCHARGE
Start: 2024-06-03

## 2024-06-03 RX ORDER — ESCITALOPRAM OXALATE 10 MG/1
1.5 TABLET, FILM COATED ORAL
Refills: 0 | DISCHARGE

## 2024-06-03 RX ADMIN — CHLORHEXIDINE GLUCONATE 1 APPLICATION(S): 213 SOLUTION TOPICAL at 06:09

## 2024-06-03 RX ADMIN — Medication 100 MILLIGRAM(S): at 13:13

## 2024-06-03 RX ADMIN — ESCITALOPRAM OXALATE 30 MILLIGRAM(S): 10 TABLET, FILM COATED ORAL at 13:14

## 2024-06-03 RX ADMIN — MONTELUKAST 10 MILLIGRAM(S): 4 TABLET, CHEWABLE ORAL at 13:14

## 2024-06-03 RX ADMIN — PANTOPRAZOLE SODIUM 40 MILLIGRAM(S): 20 TABLET, DELAYED RELEASE ORAL at 06:09

## 2024-06-03 RX ADMIN — ENOXAPARIN SODIUM 40 MILLIGRAM(S): 100 INJECTION SUBCUTANEOUS at 06:10

## 2024-06-03 RX ADMIN — CLOPIDOGREL BISULFATE 75 MILLIGRAM(S): 75 TABLET, FILM COATED ORAL at 13:13

## 2024-06-03 RX ADMIN — Medication 81 MILLIGRAM(S): at 13:13

## 2024-06-03 RX ADMIN — Medication 1 MILLIGRAM(S): at 13:13

## 2024-06-03 NOTE — DISCHARGE NOTE PROVIDER - CARE PROVIDER_API CALL
Ivett Gaines  Internal Medicine  [address] [place] [id number]  Phone: [phone]  Fax: [phone]  Established Patient  Follow Up Time:     Chase Jesus  Neurology  [address] [place] [id number]  Phone: [phone]  Fax: [phone]  Scheduled Appointment: 07/29/2024 11:15 AM

## 2024-06-03 NOTE — DISCHARGE NOTE PROVIDER - NSDCCPCAREPLAN_GEN_ALL_CORE_FT
PRINCIPAL DISCHARGE DIAGNOSIS  Diagnosis: Stroke  Assessment and Plan of Treatment: You came to the hospital on 5/19 and you were found to have a stroke on the left side of your brain that caused right-sided weakness, which is why you kept falling at home. You were discharged on 5/21 because you and your family wanted you to go home, however at home you fell several times, including when the visiting nurse was at your home to evaluate you, and 911 was called to bring you back to the hospital. When you were stable, you came to the Rehab medicine service in order to receive intensive therapy, consisting of physical, occupational, speech, neuropsychology and recreation. Therapy will continue at the short-term rehab facility where you will be discharged on 6/3/24. You are now taking 2 blood-thinners to help prevent another stroke - aspirin 81mg daily which will continue indefinitely, and Plavix (clopidogrel) 75mg daily which is given for 3 weeks and the last day will be 6/10/24. In addition, you will need to continue a low salt low fat low cholesterol diet and it is also very important that  you follow up with all your doctors; you  have appointments with the Stroke clinic at 11:15AM on 7/29/24 at [address] [place] [id number]; also with the Electrophysiologist (Cardiologist who specializes in abnormal heart rhythms) on 6/14, to follow up on the loop recorder that was placed in your chest on 5/24, which is a tiny device that records your heartbeat and transmits the tracings to the EP Cardiologist - it looks for abnormal heart rhythms such as atrial fibrillation, which is a frequent cause of strokes. You should also follow up with your Primary Care Provider (PCP) Dr. Gaines.      SECONDARY DISCHARGE DIAGNOSES  Diagnosis: HTN (hypertension)  Assessment and Plan of Treatment: You also have a history of CAD (coronary artery disease) with CABG- your blood pressure was high when you came to the hospital, and it is now well-controlled on your current medication and diet. Please continue as prescribed and follow up with your PCP and Cardiologist. You have an appointment with your Cardiologist on 7/19/24.    Diagnosis: History of alcohol use disorder  Assessment and Plan of Treatment: Drinking alcohol after a stroke can cause more damage to your brain so it is very important that you abstain from drinking. You are taking vitamins and other medications to help slow the progression of cognitive decline. Continuing therapy will also help to stimulate your mind, and participating in activities that you enjoy will also help.

## 2024-06-03 NOTE — PROGRESS NOTE ADULT - ASSESSMENT
ASSESSMENT/PLAN  87 y/o F with PMHx of CABG, dementia, asthma, alcohol use disorder, and recent CVA with right sided weakness (on 5/19/2024) that presented to the ED on 5/22 with generalized weakness s/p two falls at home.    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  - Trauma workup on admission unremarkable, no fractures  - EEG : Global focal slowing; no epileptic activity seen; non specific   -Echo pending - can be done on rehab per EPS and Neuro  -s/p ILR 5/24   - c/w ASA and plavix for 3 weeks (started 5/19; Plavix end date 6/10) and then ASA 81mg only  -c/w lipitor 80mg   -PT/OT/SLP eval and treat   -neuropsych consult  Discharge to STR today    #Orthostasis - improved   - Increasing BUN and skin tenting c/w hypovolemia 5/26  - son states that patient does not drink much  - given IVF 5/26 and asymptomatic 5/27.   - encourage PO fluids and monitor    # CAD s/p CABG  - c/w lipitor     #Essential Hypertension   - inpt bp goal <140/90 - above goal   - 5/29 on lisinopril 5mg   - 5/29 added extra 5mg lisinopril 5mg nightly  - 5/30 onwards c/w lisinopril 10mg nightly   - discontinued toprol 25mg daily due to bradycardia 5/30   - Monitor BP adjust medications as necessary     #Alcohol Use Disorder  - Patient reports that patient drinks approx 4-5 glasses of wine per day, has been drinking this way "for years"  - folic acid and thiamine supplementation    #diarrhea - improved  - possible related to ETOH w/drawl  - spoke with son - this is a chronic condition at home (patient with poor memory - poor historian)  -loperamide prn  -low lactose diet  - monitor  - can f/u as an outpatient    #Long-term memory impairment with confabulation and poor insight c/w Wernicke's Encephalopathy  - c/w Donepezil 10 mg qhs  - continue thiamine  - B12 and TSH WNL    #Depression  - c/w Lexapro 30 mg qd    #Asthma  - c/w Montelukast 10 mg qd      -Pain control: Tylenol prn     -Skin:  No active issues at this time    Diet, DASH/TLC:   Sodium & Cholesterol Restricted  Lactose Restricted (Milk Sugar Intoler.) (05-26-24 @ 10:32) [Active]       Precautions / PROPHYLAXIS:    - Falls    - DVT prophylaxis: lovenox

## 2024-06-03 NOTE — DISCHARGE NOTE PROVIDER - NSDCMRMEDTOKEN_GEN_ALL_CORE_FT
acetaminophen 325 mg oral tablet: 2 tab(s) orally every 6 hours as needed for Temp greater or equal to 38C (100.4F), Mild Pain (1 - 3), Moderate Pain (4 - 6), Severe Pain (7 - 10)  aspirin 81 mg oral delayed release tablet: 1 tab(s) orally once a day take with food  atorvastatin 80 mg oral tablet: 1 tab(s) orally once a day (at bedtime)  clopidogrel 75 mg oral tablet: 1 tab(s) orally once a day (FOR 21 DAYS TOTAL - LAST DAY IS 6/10/24)  donepezil 10 mg oral tablet: 1 tab(s) orally once a day (at bedtime)  enoxaparin: 40 milligram(s) subcutaneous once a day  escitalopram 10 mg oral tablet: 3 tab(s) orally once a day  folic acid 1 mg oral tablet: 1 tab(s) orally once a day  lisinopril 10 mg oral tablet: 1 tab(s) orally once a day (at bedtime)  montelukast 10 mg oral tablet: 1 tab(s) orally once a day  pantoprazole 40 mg oral delayed release tablet: 1 tab(s) orally once a day (in the morning) take 30 minutes before breakfast  thiamine 100 mg oral tablet: 1 tab(s) orally once a day

## 2024-06-03 NOTE — PROGRESS NOTE ADULT - PROVIDER SPECIALTY LIST ADULT
Rehab Medicine
Neuropsychology
Neuropsychology
Rehab Medicine
Neuropsychology
Rehab Medicine
Rehab Medicine

## 2024-06-03 NOTE — PROGRESS NOTE ADULT - REASON FOR ADMISSION
Chief Complaint   Patient presents with   • Follow-up     Leg edema        HISTORY OF PRESENT ILLNESS:   Joseph C Glischinski is a(n) 71 year old male who presents to the clinic today with the following medical problems:    1. Medication management      Presents in two week follow-up of bilateral lower extremity edema.  At last visit we reduced furosemide to 40 mg in the morning, 20 mg in the afternoon.  Amlodipine was stopped to see if this would help his lower extremity edema.  Unfortunately his blood pressure did not tolerate this change.  He was restarted on amlodipine 01/20/2021.  Blood pressures are trending down again.  He had labs prior to today's visit, renal function has slightly improved with the reduction of furosemide.  Blood pressure remains slightly elevated.      MEDICATIONS:   Current Outpatient Medications   Medication Sig Dispense Refill   • metFORMIN (GLUCOPHAGE) 500 MG tablet TAKE 1 TABLET TWICE A DAY WITH MEALS 180 tablet 3   • glipiZIDE (GLUCOTROL) 5 MG tablet TAKE 1 TABLET TWICE A DAY BEFORE MEALS 180 tablet 3   • amlodipine-atorvastatin (CADUET) 5-40 MG per tablet TAKE 1 TABLET DAILY 90 tablet 3   • furosemide (LASIX) 20 MG tablet 40 mg in the morning, 20 mg in the afternoon. 90 tablet 0   • levothyroxine 50 MCG tablet TAKE 1 TABLET DAILY 90 tablet 3   • clobetasol (TEMOVATE) 0.05 % cream Apply topically 2 times daily. 30 g 0   • Cholecalciferol (VITAMIN D3) 50 mcg (2,000 units) tablet Take 50 mcg by mouth daily.     • allopurinol (ZYLOPRIM) 300 MG tablet TAKE 1 TABLET DAILY 90 tablet 3   • blood glucose (FREESTYLE LITE) test strip USE TO TEST BLOOD SUGAR TWICE A DAY AS DIRECTED 100 each 4   • JANUVIA 100 MG tablet TAKE 1 TABLET DAILY 90 tablet 3   • levothyroxine (SYNTHROID, LEVOTHROID) 25 MCG tablet TAKE 1/2 TABLET BY MOUTH ON MONDAYS, WEDNESDAYS, & FRIDAYS ALONG WITH 50 MCG TABLET TO =62.5 MCG M,W,F. 18 tablet 3   • Blood Glucose Monitoring Suppl (FREESTYLE LITE) Device USE UTD  0   •  Stroke with right hemiparesis (dominant) aspirin 81 MG tablet Take 81 mg by mouth daily.     • Omega-3 Fatty Acids (FISH OIL) 1000 MG capsule Take 1 g by mouth daily.      • spironolactone (ALDACTONE) 25 MG tablet Take 1 tablet by mouth daily. 30 tablet 0     No current facility-administered medications for this visit.        REVIEW OF SYSTEMS:   General:  The patient denies unusual weight loss or gain.  Denies fevers or chills.  Denies lightheadedness or dizziness.  Denies unusual weakness and fatigue.  Respiratory:  Denies unusual shortness of breath.  Denies wheezing.  Denies cough or sputum production.    Cardiovascular:  Denies chest pains or pressure.  Denies palpitations.  Denies orthopnea or paroxysmal nocturnal dyspnea.  Denies unusual shortness of breath or chest pains with exertion.    The remainder of the patient's review of systems was evaluated and was determined to be negative or unchanged from previous.    PHYSICAL EXAMINATION:   Visit Vitals  BP (!) 152/88   Pulse 88   Temp 96.2 °F (35.7 °C) (Temporal)   Ht 5' 11\" (1.803 m)   Wt 126.7 kg   SpO2 96%   BMI 38.96 kg/m²    7 lb weight loss since last visit    General:  Obese  male, in no obvious or acute distress.   Chest:  Clear to auscultation bilaterally.  No wheezing, no rales.  Respiratory effort is normal, no accessory muscle use.   Cardiovascular:  Heart demonstrates regular rate and rhythm, nontachycardic.  No audible murmurs or rubs.  Extremities:  Normal gait.  Bilateral lower extremities have +1 pitting edema.       LAB AND X-RAY:  Lab Results   Component Value Date/Time    WBC 11.0 08/16/2018 08:25 AM    HGB 15.9 08/16/2018 08:25 AM    HCT 48.7 08/16/2018 08:25 AM     08/16/2018 08:25 AM    MCV 81.6 08/16/2018 08:25 AM     Lab Results   Component Value Date/Time    SODIUM 142 01/22/2021 11:40 AM    SODIUM 145 08/19/2019 07:40 AM    POTASSIUM 3.7 01/22/2021 11:40 AM    POTASSIUM 3.8 08/19/2019 07:40 AM    BUN 18 01/22/2021 11:40 AM    BUN 16 08/19/2019 07:40 AM     CREATININE 1.18 (H) 01/22/2021 11:40 AM    CREATININE 1.08 08/19/2019 07:40 AM       ASSESSMENT/PLAN:  :   Medication management  (primary encounter diagnosis)  Plan: BASIC METABOLIC PANEL      Essential hypertension  Comment:  Poor control today.  Has been slightly elevated.  Plan:  Add spironolactone 25 mg daily.  Patient is asked to monitor his blood pressures carefully.  Continue were compression stockings bilateral lower extremities.    Stage 3a chronic kidney disease (CMS/HCC)  Comment:  Slightly improved with the reduction of furosemide.  Plan:  At spironolactone 25 mg daily.  Repeat labs in 1 month.  Staff to call patient in 2 weeks to see how blood pressures are running.  Patient to call with any concerns.    Bilateral lower extremity edema  Plan:  Spironolactone 25 mg daily.  Continue furosemide 40 mg in the morning and 20 mg in the afternoon.  Continue wearing compression stockings daily.    Patient is asked to monitor his blood pressure over the next 2 weeks, keep record and Bring with him to his next appointment.      No problems updated.  Health Maintenance   Topic Date Due   • DTaP/Tdap/Td Vaccine (2 - Td) 10/25/2020   • DM/CKD Microalbumin  02/21/2021   • Diabetes A1C  05/24/2021   • Diabetes Foot Exam  12/11/2021   • Medicare Wellness Visit  12/11/2021   • Depression Screening  12/11/2021   • DM/CKD GFR  01/15/2022   • Diabetes Eye Exam  01/18/2022   • Colonoscopy Risk  12/04/2023   • Abdominal Aortic Aneurysm (AAA) Screening  Completed   • Influenza Vaccine  Completed   • Hepatitis C Screening  Completed   • Shingles Vaccine  Completed   • Pneumococcal Vaccine 65+  Completed   • Meningococcal Vaccine  Aged Out   • HPV Vaccine  Aged Out         No problem-specific Assessment & Plan notes found for this encounter.      Orders Placed This Encounter   • Basic Metabolic Panel   • spironolactone (ALDACTONE) 25 MG tablet       Return in about 4 weeks (around 2/19/2021) for labs prior.    After Visit  Summary given.

## 2024-06-03 NOTE — PROGRESS NOTE ADULT - ATTENDING COMMENTS
Rehab of decline in function. Patient seen and examined with the resident. The treatment plan discussed. Agree with the above. D/C pt to STR today.

## 2024-06-03 NOTE — DISCHARGE NOTE PROVIDER - INSTRUCTIONS
** continue low salt heart healthy (low fat low cholesterol) diet - also lactose -free diet ( you can try drinking/eating lactose-free dairy foods)

## 2024-06-03 NOTE — PROGRESS NOTE ADULT - SUBJECTIVE AND OBJECTIVE BOX
Patient is a 88 y old  Female who presents with a chief complaint of Stroke with right hemiparesis (dominant) (24 May 2024 10:14)    HPI:  88y Female with PMHx of CAD status post CABG, alcohol use disorder, asthma presented to ED initially on 5/19  with complaints of sudden onset right sided weakness beginning last night at 10pm. pt woke up with generalized weakness that was nonlocalized which caused her to feel weak while standing. Pt endorses fall last week in which she hit her head slightly. Stroke code called in ED. /91 in triage. CTH negative, CTP negative, CTA head and neck showing mild stenosis. MRI showed acute infarct of the left corona radiata/putamen.  Pt was started on DAPT and statin, and pt was discharged 5/21 per pt/family wishes with outpatient follow up for TTE and ILR.     On 5/22 pt presented to ED with generalized weakness s/p two falls at home. Son stated that [facility] was at the patient's home evaluating her for home PT/OT when they noticed that patient was unsteady on her feet, and while speaking to son on the phone, Patient fell from standing position onto her knees onto a carpeted floor, no head injury or LOC. Decision was made to call EMS; when EMS arrived and was beginning transport into [facility], patient once again fell from standing position onto her knees, no head injury or LOC. BP in ED was  193/76 and given IV labetalol. Trauma workup unremarkable.  EPS saw patient, plan  for ILR and TTE, which they said can be done on the rehab unit. Once medically stabilized patient considered a good candidate for acute rehab.     PMR team evaluated the patient and deemed her appropriate for acute rehab 2/2 decline in function with multiple comorbidities and ability to tolerate and benefit for 3 hours of therapy daily at least 5 days a week.   PLOF: patient independent with ADLs and no AD for ambulation.   Admission LOF: Matt for bed mobility and transfers, ambulates 50ft with RW CGA, Matt fo rUBD, modA for LBD, dependent for toileting  LS: Pt lives alone in private house with 1 step to enter. Has an attic with 10 steps to enter. Of note, the patient admits to drinking 4-5 glasses of wine daily, and reportedly was drinking at home after her discharge from the hospital. At this point, she does not remember being home after her stroke admission.    (24 May 2024 10:14)    TODAY'S SUBJECTIVE & REVIEW OF SYMPTOMS:  Patient seen this AM. No overnight events. doing well, participating in therapies, tolerating diet. voiding spontaneously. vitals reviewed  Patient to be discharged this AM.      Constitutional:    [ x  ] WNL           [   ] poor appetite   [   ] insomnia   [   ] tired   Cardio:                [x   ] WNL           [   ] CP   [   ] MISTRY   [   ] palpitations    [   ] weak after standing in PT           Resp:                   [ x  ] WNL           [   ] SOB   [   ] cough   [   ] wheezing   GI:                        [   ] WNL           [   ] constipation   [   x] diarrhea (family reports chronic) [   ] abdominal pain   [   ] nausea   [   ] emesis                                :                      [  x ] WNL           [   ] URIAS  [   ] dysuria   [   ] difficulty voiding             Endo:                   [  x ] WNL          [   ] polyuria   [   ] temperature intolerance                 Skin:                     [  x ] WNL          [   ] pain   [   ] wound   [   ] rash   MSK:                    [ x  ] WNL          [   ] muscle pain   [   ] joint pain/ stiffness   [   ] muscle tenderness   [   ] swelling   Neuro:                 [   ] WNL          [   ] HA   [   ] change in vision   [   ] tremor   [x   ] weakness   [   ]dysphagia              Cognitive:           [   ] WNL           [  x ]confusion    (patient is confused and a poor historian, though not aware of her deficits)   Psych:                  [ x  ] WNL           [   ] hallucinations   [   ]agitation   [   ] delusion   [   ]depression    PHYSICAL EXAM  Vital Signs Last 24 Hrs  T(C): 36.9 (03 Jun 2024 05:25), Max: 36.9 (03 Jun 2024 05:25)  T(F): 98.4 (03 Jun 2024 05:25), Max: 98.4 (03 Jun 2024 05:25)  HR: 58 (03 Jun 2024 05:25) (56 - 58)  BP: 135/73 (03 Jun 2024 05:25) (135/73 - 156/73)  BP(mean): 94 (03 Jun 2024 05:25) (94 - 94)  RR: 18 (03 Jun 2024 05:25) (18 - 18)  SpO2: 96% (03 Jun 2024 05:25) (96% - 96%)      General:[  x ] NAD, Resting Comfortable,   [   ] other:                                HEENT: [x   ] NC/AT, EOMI, PERRL , Normal Conjunctivae,   [   ] other:  Cardio: [x   ] RRR, no murmer,   [   ] other:                              Pulm: [  x ] No Respiratory Distress,  Lungs CTAB,   [   ] other:                       Abdomen: [  x ]ND/NT, Soft,   [   ] other:    : [  x ] NO URIAS CATHETER, [   ] URIAS CATHETER- no meatal tear, no discharge, [   ] other:                                            MSK: [  x ] No joint swelling, Full ROM,   [   ] other:                                         Ext: [ x  ]No C/C/E, No calf tenderness,   [   ]other:    Skin: [ x  ]intact,   [   ] other:  significant tenting of skin back of hand                                                                 Neurological Examination:  Cognitive: [    ] AAO x 3,   [x    ]  other: AOx2, not to year. Poor historian.                                                                  Attention:  [  x  ] intact,   [    ]  other:  able to follow 2 step commands and spell 'WORLD" backwards                          Memory: [    ] intact,    [ x   ]  other: 1/3 5 min. recall, 2/3 word recall with cues. Poor historian, poor insight. Confabulates.  Mood/Affect: [   x ] wnl,    [    ]  other:                                                                             Communication: [    ]Fluent, no dysarthria, following commands:  [   x ] other: slow, hesitant speech, fluent, comprehension and repetition intact, decreased word finding.  CN II - XII:  [   x ] intact,  [    ] other:                                                                                        Motor:   RIGHT UE: [   ] WNL,  [   x] other: Shoulder abduction, EF/EF 4/5 hand  and wrist extension 5/5  LEFT    UE: [x   ] WNL,  [   ] other:  RIGHT LE: [   ] WNL,  [ x  ] other: HF 4/5, DR 4/5  LEFT    LE: [ x  ] WNL,  [   ] other:    Tone: [x    ] wnl,   [    ]  other:  DTRs: [ x  ]symmetric, [   ] other:  Coordination:   [   x ] intact,   [ x   ] other: decreased proprioception on RLE                                                                          Sensory: [     ] Intact to light touch,   [  x  ] other: impaired kinesthetic awareness RUE, decreased proprioception right great toe > right hand      MEDICATIONS  (STANDING):  aspirin enteric coated 81 milliGRAM(s) Oral daily  atorvastatin 80 milliGRAM(s) Oral at bedtime  chlorhexidine 2% Cloths 1 Application(s) Topical <User Schedule>  clopidogrel Tablet 75 milliGRAM(s) Oral daily  donepezil 10 milliGRAM(s) Oral at bedtime  enoxaparin Injectable 40 milliGRAM(s) SubCutaneous every 24 hours  escitalopram 30 milliGRAM(s) Oral daily  folic acid 1 milliGRAM(s) Oral daily  lisinopril 10 milliGRAM(s) Oral at bedtime  montelukast 10 milliGRAM(s) Oral daily  pantoprazole    Tablet 40 milliGRAM(s) Oral before breakfast  sodium chloride 0.9%. 1000 milliLiter(s) (75 mL/Hr) IV Continuous <Continuous>  thiamine 100 milliGRAM(s) Oral daily    MEDICATIONS  (PRN):  acetaminophen     Tablet .. 650 milliGRAM(s) Oral every 6 hours PRN Temp greater or equal to 38C (100.4F), Mild Pain (1 - 3), Moderate Pain (4 - 6), Severe Pain (7 - 10)  loperamide 2 milliGRAM(s) Oral two times a day PRN Diarrhea  melatonin 3 milliGRAM(s) Oral at bedtime PRN Insomnia      RECENT LABS/IMAGING                          13.0   5.57  )-----------( 339      ( 03 Jun 2024 06:07 )             39.6     06-03    136  |  104  |  7<L>  ----------------------------<  94  4.6   |  22  |  0.8    Ca    9.4      03 Jun 2024 06:07  Mg     1.9     06-03    TPro  6.2  /  Alb  4.0  /  TBili  0.6  /  DBili  x   /  AST  22  /  ALT  24  /  AlkPhos  71  06-03      Urinalysis Basic - ( 03 Jun 2024 06:07 )    Color: x / Appearance: x / SG: x / pH: x  Gluc: 94 mg/dL / Ketone: x  / Bili: x / Urobili: x   Blood: x / Protein: x / Nitrite: x   Leuk Esterase: x / RBC: x / WBC x   Sq Epi: x / Non Sq Epi: x / Bacteria: x

## 2024-06-03 NOTE — DISCHARGE NOTE PROVIDER - NSDCFUSCHEDAPPT_GEN_ALL_CORE_FT
Shonda Fajardo  [facility]  ELECTROPH [address]  Scheduled Appointment: 06/14/2024    [facility]  CARDIOLOGY [address]  Scheduled Appointment: 07/19/2024     Shonda Fajardo  [facility]  ELECTROPH [address]  Scheduled Appointment: 06/14/2024    [facility]  CARDIOLOGY [address]  Scheduled Appointment: 07/19/2024    Yas Simpson  [facility]  NEUROLOGY [address]  Scheduled Appointment: 07/29/2024

## 2024-06-03 NOTE — PROGRESS NOTE ADULT - ASSESSMENT
NEUROPSYCHOLOGY FOLLOWUP          Session focused on: Follow-up evaluation of cognitive functioning and feedback     Pain: Denied Intervention: N/A     Orientation: Oriented to self, purpose, month, and time. Said year was 1984     Arousal Level: Alert.     Behavior: Cooperative.      Mood/Affect Range: Euthymic     Attention: Mild weakness     Insight into illness/deficits: Limited          Measure(s) Given:      Patient was re-evaluated prior to discharge and the following neuropsychological measures were given: Cognistat, Clock Drawing Test          Clinical Summary:     Patient’s son was contacted to share results of cognitive testing over the course of patient’s hospitalization. Her performance on cognitive testing over the course of her hospitalization revealed stable deficits in attention/working memory/concentration, visuospatial ability, and memory (0/4 on free recall, 2/4 cued recall, and 0/4 w/MC). Overall, patient continues to meet criteria for major neurocognitive disorder due at this time due to impairment in 2+ cognitive domains and reduction in ADLs.           In terms of etiology, per collateral, memory was impaired at baseline (and is presumably the reason she was prescribed Aricept and Namenda), which can suggest a neurodegenerative process, such as Alzheimer’s or alcohol-related dementia (4-6 drinks/7 days x week; AUDIT score = 8). Visuospatial deficits are also common in alcohol-related dementia, thought they may be the result of patient’s recent stroke. In addition, patient’s pre-existing vascular risk factors (e.g., CAD) may be contributing to her cognitive presentation.          Prognosis is fair, as patient’s motor functioning has improved with intensive rehabilitation; however, it is unlikely that memory difficulties can be rehabilitated, as they likely are largely due to pre-existing factors (per above). Follow up outpatient evaluation can be helpful for diagnostic/etiological clarity. Abstention from alcohol is recommended, as alcohol consumption can contribute to possibility of further stroke and cognitive decline          Feedback: Feedback was provided to patient’s family member regarding patient’s cognitive symptoms, possible diagnoses, and prognosis. Recommendations were provided regarding      Patient safety, including the need for increased supervision and monitoring for all activities of daily living      Assistive technology for falls/emergencies     Abstaining from alcohol/reducing access to alcohol     Promoting brain health via social interaction, behavioral activation (games, hobbies, etc.), and gentle daily physical activity.          Patient's family indicated that they understood and agreed with the recommendations.          Plan: Discharge from neuropsychological service.

## 2024-06-03 NOTE — DISCHARGE NOTE PROVIDER - PROVIDER TOKENS
PROVIDER:[TOKEN:[[id number]],ESTABLISHEDPATIENT:[T]],PROVIDER:[TOKEN:[[id number]],SCHEDULEDAPPT:[07/29/2024],SCHEDULEDAPPTTIME:[11:15 AM]]

## 2024-06-03 NOTE — DISCHARGE NOTE NURSING/CASE MANAGEMENT/SOCIAL WORK - PATIENT PORTAL LINK FT
You can access the FollowMyHealth Patient Portal offered by [facility] by registering at the following website: [email or web address]. By joining [facility]’s FollowMyHealth portal, you will also be able to view your health information using other applications (apps) compatible with our system.

## 2024-06-03 NOTE — DISCHARGE NOTE PROVIDER - HOSPITAL COURSE
Vidhi Cao is an 87yo woman with hx of CAD status post CABG, alcohol use disorder, asthma, who presented to ED initially on 5/19  with complaints of sudden onset right sided weakness that began the night before admission. The patient also gave hx of a fall the week PTA in which she hit her head slightly. Stroke code was called in ED. /91 in triage. CTH negative, CTP negative, CTA head and neck showing mild stenosis. MRI showed acute infarct of the left corona radiata/putamen. She was started on DAPT and statin, and pt was discharged 5/21 per pt/family wishes with outpatient follow up for TTE and ILR. On 5/22 pt presented to ED with generalized weakness s/p two falls at home. Son stated that [facility] was at the patient's home evaluating her for home PT/OT when they noticed that patient was unsteady on her feet, and while speaking to son on the phone, the patient fell again from standing position onto her knees onto a carpeted floor, no head injury or LOC. Decision was made to call EMS; when EMS arrived and was beginning transport into [facility], patient once again fell from standing position onto her knees, no head injury or LOC. BP in ED was  193/76 and given IV labetalol. Trauma workup unremarkable.  EPS saw patient, plan  for ILR and TTE, which they said can be done on the rehab unit. Once medically stabilized patient was evaluated and considered a good candidate for acute rehab. She was admitted to Rehab medicine service on 5/24/24.  PLOF: patient independent with ADLs and no AD for ambulation.   Admission LOF: Matt for bed mobility and transfers, ambulates 50ft with RW CGA, Matt fo rUBD, modA for LBD, dependent for toileting  LS: Pt lives alone in private house with 1 step to enter. Has an attic with 10 steps to enter. Of note, the patient admits to drinking 4-5 glasses of wine daily, and reportedly was drinking at home after her discharge from the hospital. At this point, she does not remember being home after her stroke admission.   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  - Trauma workup on admission unremarkable, no fractures  - EEG : Global focal slowing; no epileptic activity seen; non specific   -Echo pending - can be done on rehab per EPS and Neuro  -s/p ILR 5/24   - c/w ASA and plavix for 3 weeks (started 5/19; Plavix end date 6/10) and then ASA 81mg only  -c/w lipitor 80mg at bedtime  -PT/OT/SLP eval and treat - completed at [facility], will continue at [facility] facility  -neuropsych consult done  Discharge to [facility] today    #Orthostasis - improved   - Increasing BUN and skin tenting c/w hypovolemia 5/26  - son states that patient does not drink much  - given IVF 5/26 and asymptomatic 5/27.   - encourage PO fluids and monitor    # CAD s/p CABG  - c/w lipitor     #Essential Hypertension   - inpt bp goal <140/90 - above goal   - 5/29 on lisinopril 5mg   - 5/29 added extra 5mg lisinopril 5mg nightly  - 5/30 onwards c/w lisinopril 10mg nightly   - discontinued toprol 25mg daily due to bradycardia 5/30   - Monitor BP adjust medications as necessary - BP is stable = 135/73 this AM    #Alcohol Use Disorder  - Patient reports that patient drinks approx 4-5 glasses of wine per day, has been drinking this way "for years"  - folic acid and thiamine supplementation    #diarrhea - improved  - possible related to ETOH w/drawl  - spoke with son - this is a chronic condition at home (patient with poor memory - poor historian)  -loperamide prn - hasn't needed to take it during this admission  -low lactose diet  - monitor  - can f/u as an outpatient    #Long-term memory impairment with confabulation and poor insight c/w Wernicke's Encephalopathy  - c/w Donepezil 10 mg qhs  - continue thiamine  - B12 and TSH WNL    #Depression  - c/w Lexapro 30 mg qd    #Asthma  - c/w Montelukast 10 mg qd  -Pain control: Tylenol prn   -Skin:  No active issues at this time    Precautions / PROPHYLAXIS:    - Falls  - DVT prophylaxis: lovenox     Mrs Cao is being discharged to [facility] facility on 6/3/24 in order to continue PT/OT/SLP/neuropsychology. She will need to follow up after discharge with her PCP Dr Gaines, and she has an appointment with Neurology (Stroke) clinic on 7/29/24, also has appointments with EP on 6/14 and Cardiology on 7/19.      Vidhi Cao is an 89yo woman with hx of CAD status post CABG, alcohol use disorder, asthma, who presented to ED initially on 5/19  with complaints of sudden onset right sided weakness that began the night before admission. The patient also gave hx of a fall the week PTA in which she hit her head slightly. Stroke code was called in ED. /91 in triage. CTH negative, CTP negative, CTA head and neck showing mild stenosis. MRI showed acute infarct of the left corona radiata/putamen. She was started on DAPT and statin, and pt was discharged 5/21 per pt/family wishes with outpatient follow up for TTE and ILR. On 5/22 pt presented to ED with generalized weakness s/p two falls at home. Son stated that [facility] was at the patient's home evaluating her for home PT/OT when they noticed that patient was unsteady on her feet, and while speaking to son on the phone, the patient fell again from standing position onto her knees onto a carpeted floor, no head injury or LOC. Decision was made to call EMS; when EMS arrived and was beginning transport into [facility], patient once again fell from standing position onto her knees, no head injury or LOC. BP in ED was  193/76 and given IV labetalol. Trauma workup unremarkable.  EPS saw patient, plan  for ILR and TTE, which they said can be done on the rehab unit. Once medically stabilized patient was evaluated and considered a good candidate for acute rehab. She was admitted to Rehab medicine service on 5/24/24.  PLOF: patient independent with ADLs and no AD for ambulation.   Admission LOF: Matt for bed mobility and transfers, ambulates 50ft with RW CGA, Matt fo rUBD, modA for LBD, dependent for toileting  LS: Pt lives alone in private house with 1 step to enter. Has an attic with 10 steps to enter. Of note, the patient admits to drinking 4-5 glasses of wine daily, and reportedly was drinking at home after her discharge from the hospital. At this point, she does not remember being home after her stroke admission.   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  - Trauma workup on admission unremarkable, no fractures  - EEG : Global focal slowing; no epileptic activity seen; non specific   -Echo pending - can be done on rehab per EPS and Neuro  -s/p ILR 5/24   - c/w ASA and plavix for 3 weeks (started 5/19; Plavix end date 6/10) and then ASA 81mg only  -c/w lipitor 80mg at bedtime  -PT/OT/SLP eval and treat - completed at [facility], will continue at [facility] facility  -neuropsych consult done  Discharge to [facility] today    #Orthostasis - improved   - Increasing BUN and skin tenting c/w hypovolemia 5/26  - son states that patient does not drink much  - given IVF 5/26 and asymptomatic 5/27.   - encourage PO fluids and monitor    # CAD s/p CABG  - c/w lipitor     #Essential Hypertension   - inpt bp goal <140/90 - above goal   - 5/29 on lisinopril 5mg   - 5/29 added extra 5mg lisinopril 5mg nightly  - 5/30 onwards c/w lisinopril 10mg nightly   - discontinued toprol 25mg daily due to bradycardia 5/30   - Monitor BP adjust medications as necessary - BP is stable = 135/73 this AM    #Alcohol Use Disorder  - Patient reports that patient drinks approx 4-5 glasses of wine per day, has been drinking this way "for years"  - folic acid and thiamine supplementation    #diarrhea - improved  - possible related to ETOH w/drawl  - spoke with son - this is a chronic condition at home (patient with poor memory - poor historian)  -loperamide prn - hasn't needed to take it during this admission  -low lactose diet  - monitor  - can f/u as an outpatient    #Long-term memory impairment with confabulation and poor insight c/w Wernicke's Encephalopathy  - c/w Donepezil 10 mg qhs  - continue thiamine  - B12 and TSH WNL    #Depression  - c/w Lexapro 30 mg qd    #Asthma  - c/w Montelukast 10 mg qd  -Pain control: Tylenol prn   -Skin:  No active issues at this time    Precautions / PROPHYLAXIS:    - Falls  - DVT prophylaxis: lovenox     Mrs Cao is being discharged to [facility] facility on 6/3/24 in order to continue PT/OT/SLP/neuropsychology. She will need to follow up after discharge with her PCP Dr Gaines, and she has an appointment with Neurology (Stroke) clinic on 7/29/24, also has appointments with EP on 6/14 and Cardiology on 7/19.

## 2024-06-05 DIAGNOSIS — Z79.82 LONG TERM (CURRENT) USE OF ASPIRIN: ICD-10-CM

## 2024-06-05 DIAGNOSIS — F03.90 UNSPECIFIED DEMENTIA, UNSPECIFIED SEVERITY, WITHOUT BEHAVIORAL DISTURBANCE, PSYCHOTIC DISTURBANCE, MOOD DISTURBANCE, AND ANXIETY: ICD-10-CM

## 2024-06-05 DIAGNOSIS — I25.10 ATHEROSCLEROTIC HEART DISEASE OF NATIVE CORONARY ARTERY WITHOUT ANGINA PECTORIS: ICD-10-CM

## 2024-06-05 DIAGNOSIS — Z95.1 PRESENCE OF AORTOCORONARY BYPASS GRAFT: ICD-10-CM
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
## 2024-06-05 DIAGNOSIS — Z79.02 LONG TERM (CURRENT) USE OF ANTITHROMBOTICS/ANTIPLATELETS: ICD-10-CM

## 2024-06-05 DIAGNOSIS — R41.3 OTHER AMNESIA: ICD-10-CM

## 2024-06-05 DIAGNOSIS — F10.90 ALCOHOL USE, UNSPECIFIED, UNCOMPLICATED: ICD-10-CM

## 2024-06-05 DIAGNOSIS — Z91.81 HISTORY OF FALLING: ICD-10-CM

## 2024-06-05 DIAGNOSIS — I69.351 HEMIPLEGIA AND HEMIPARESIS FOLLOWING CEREBRAL INFARCTION AFFECTING RIGHT DOMINANT SIDE: ICD-10-CM

## 2024-06-05 DIAGNOSIS — F32.A DEPRESSION, UNSPECIFIED: ICD-10-CM

## 2024-06-05 DIAGNOSIS — I95.1 ORTHOSTATIC HYPOTENSION: ICD-10-CM

## 2024-06-05 DIAGNOSIS — I10 ESSENTIAL (PRIMARY) HYPERTENSION: ICD-10-CM

## 2024-06-05 DIAGNOSIS — R19.7 DIARRHEA, UNSPECIFIED: ICD-10-CM

## 2024-06-05 DIAGNOSIS — J45.909 UNSPECIFIED ASTHMA, UNCOMPLICATED: ICD-10-CM

## 2024-06-14 ENCOUNTER — APPOINTMENT (OUTPATIENT)
Dept: ELECTROPHYSIOLOGY | Facility: CLINIC | Age: 89
End: 2024-06-14

## 2024-06-14 VITALS
BODY MASS INDEX: 25.49 KG/M2 | HEART RATE: 61 BPM | DIASTOLIC BLOOD PRESSURE: 92 MMHG | SYSTOLIC BLOOD PRESSURE: 179 MMHG | HEIGHT: 61 IN | TEMPERATURE: 98 F | WEIGHT: 135 LBS

## 2024-06-14 VITALS — BODY MASS INDEX: 25.49 KG/M2 | TEMPERATURE: 98 F | HEIGHT: 61 IN | WEIGHT: 135 LBS

## 2024-06-14 DIAGNOSIS — Z48.89 ENCOUNTER FOR OTHER SPECIFIED SURGICAL AFTERCARE: ICD-10-CM

## 2024-06-14 DIAGNOSIS — Z45.09 ENCOUNTER FOR ADJUSTMENT AND MANAGEMENT OF OTHER CARDIAC DEVICE: ICD-10-CM

## 2024-06-14 DIAGNOSIS — I63.9 CEREBRAL INFARCTION, UNSPECIFIED: ICD-10-CM

## 2024-06-14 DIAGNOSIS — I10 ESSENTIAL (PRIMARY) HYPERTENSION: ICD-10-CM

## 2024-06-14 PROCEDURE — 99214 OFFICE O/P EST MOD 30 MIN: CPT

## 2024-06-14 PROCEDURE — 93291 INTERROG DEV EVAL SCRMS IP: CPT

## 2024-06-14 RX ORDER — LISINOPRIL 10 MG/1
10 TABLET ORAL DAILY
Refills: 0 | Status: ACTIVE | COMMUNITY
Start: 2024-06-14

## 2024-06-14 RX ORDER — ATORVASTATIN CALCIUM 80 MG/1
80 TABLET, FILM COATED ORAL
Refills: 0 | Status: ACTIVE | COMMUNITY
Start: 2024-06-14

## 2024-06-14 RX ORDER — CLOPIDOGREL BISULFATE 75 MG/1
75 TABLET, FILM COATED ORAL
Refills: 0 | Status: ACTIVE | COMMUNITY
Start: 2024-06-14

## 2024-06-14 NOTE — ASSESSMENT
[FreeTextEntry1] : 88-year-old female with a known CAD status post CABG, HTN, alcohol use disorder, Asthma, presented to ED on 5/19 with complaints of sudden onset right sided weakness that began the night before.  MRI showed acute infarct of the left corona radiata/putamen.   The patient underwent ILR implantation on 05/24/2024 for a long-term cardiac arrhythmia monitoring to r/o arrhythmogenic causes of the stroke.  She presents today for wound check and device interrogation.   Wound is well healed. There are no signs or symptoms of infection of inflammation. There is no redness, no swelling, no erythema and the patient has no pain.  - Device interrogation normal. No events on interrogation. - The patient is enrolled in remote monitoring services. I reviewed remote transmission process with the patient, as well as schedule and ability to do manual transmission. We also spoke about associated co-payments with remote monitoring that may not be covered by insurance.  -EAX-uxnfhu-tv with the neurologist.  -HTN-high today. Unable to recall if she takes any medications for BP. Copy of the discharge paper printed and given to the patient and let her know she is supposed to be on Lisinopril 10 mg daily. Advised to follow-up with cardiologist or PCP for better control of BP. Low sodium diet advised.  -Follow-up in 6 months or prn.   I have also advised the patient to go to the nearest emergency room if she experiences any chest pain, dyspnea, syncope, or has any other compelling symptoms.

## 2024-06-14 NOTE — PROCEDURE
[No] : not [NSR] : normal sinus rhythm [See Device Printout] : See device printout [Longevity: ___ months] : The estimated remaining battery life is [unfilled] months [Normal] : The battery status is normal. [Sensing Amplitude ___mv] : sensing amplitude was [unfilled] mv [None] : none [de-identified] : 60 bpm [de-identified] : Abbott [de-identified] : Assert IQ 7085 [de-identified] : [id number] [de-identified] : 05/24/2024 [de-identified] : Normal device function. No episodes. The patient is enrolled on remote monitoring.

## 2024-06-14 NOTE — PHYSICAL EXAM
[General Appearance - In No Acute Distress] : no acute distress [Heart Rate And Rhythm] : heart rate and rhythm were normal [Heart Sounds] : normal S1 and S2 [] : no respiratory distress [Respiration, Rhythm And Depth] : normal respiratory rhythm and effort [Exaggerated Use Of Accessory Muscles For Inspiration] : no accessory muscle use [Clean] : clean [Dry] : dry [Healing Well] : healing well [Nail Clubbing] : no clubbing of the fingernails [Cyanosis, Localized] : no localized cyanosis [Petechial Hemorrhages (___cm)] : no petechial hemorrhages [FreeTextEntry1] : parasternal

## 2024-06-14 NOTE — CARDIOLOGY SUMMARY
[de-identified] : TTE 05/27/2024: LVEF 61%, mild to moderate annular calcification. [de-identified] : Abbott ILTAYLOR 05/24/2024.

## 2024-06-14 NOTE — HISTORY OF PRESENT ILLNESS
[de-identified] : 88-year-old female with a known CAD status post CABG, HTN, alcohol use disorder, Asthma, presented to ED on 5/19 with complaints of sudden onset right sided weakness that began the night before.  MRI showed acute infarct of the left corona radiata/putamen.   The patient underwent ILR implantation on 05/24/2024 for a long-term cardiac arrhythmia monitoring to r/o arrhythmogenic causes of the stroke.  She presents today for wound check and device interrogation.  The patient denies chest pain/discomfort, dyspnea, palpitations, dizziness, lightheadedness and syncope.

## 2024-07-19 ENCOUNTER — APPOINTMENT (OUTPATIENT)
Dept: CARDIOLOGY | Facility: CLINIC | Age: 89
End: 2024-07-19
Payer: MEDICARE

## 2024-07-19 ENCOUNTER — NON-APPOINTMENT (OUTPATIENT)
Age: 89
End: 2024-07-19

## 2024-07-19 PROCEDURE — 93298 REM INTERROG DEV EVAL SCRMS: CPT

## 2024-07-23 ENCOUNTER — APPOINTMENT (OUTPATIENT)
Dept: NEUROLOGY | Facility: CLINIC | Age: 89
End: 2024-07-23

## 2024-07-29 ENCOUNTER — APPOINTMENT (OUTPATIENT)
Dept: NEUROLOGY | Facility: CLINIC | Age: 89
End: 2024-07-29

## 2024-08-21 ENCOUNTER — APPOINTMENT (OUTPATIENT)
Dept: CARDIOLOGY | Facility: CLINIC | Age: 89
End: 2024-08-21
Payer: MEDICARE

## 2024-08-21 ENCOUNTER — NON-APPOINTMENT (OUTPATIENT)
Age: 89
End: 2024-08-21

## 2024-08-21 PROCEDURE — 93298 REM INTERROG DEV EVAL SCRMS: CPT

## 2024-09-25 ENCOUNTER — NON-APPOINTMENT (OUTPATIENT)
Age: 89
End: 2024-09-25

## 2024-09-25 ENCOUNTER — APPOINTMENT (OUTPATIENT)
Dept: CARDIOLOGY | Facility: CLINIC | Age: 89
End: 2024-09-25
Payer: MEDICARE

## 2024-09-25 PROCEDURE — 93298 REM INTERROG DEV EVAL SCRMS: CPT

## 2024-10-30 ENCOUNTER — NON-APPOINTMENT (OUTPATIENT)
Age: 89
End: 2024-10-30

## 2024-10-30 ENCOUNTER — APPOINTMENT (OUTPATIENT)
Dept: CARDIOLOGY | Facility: CLINIC | Age: 89
End: 2024-10-30
Payer: MEDICARE

## 2024-10-30 PROCEDURE — 93298 REM INTERROG DEV EVAL SCRMS: CPT

## 2024-11-08 ENCOUNTER — APPOINTMENT (OUTPATIENT)
Dept: ELECTROPHYSIOLOGY | Facility: CLINIC | Age: 89
End: 2024-11-08

## 2024-12-09 ENCOUNTER — APPOINTMENT (OUTPATIENT)
Dept: CARDIOLOGY | Facility: CLINIC | Age: 89
End: 2024-12-09

## 2025-01-10 ENCOUNTER — APPOINTMENT (OUTPATIENT)
Dept: CARDIOLOGY | Facility: CLINIC | Age: 89
End: 2025-01-10

## 2025-02-13 ENCOUNTER — APPOINTMENT (OUTPATIENT)
Dept: CARDIOLOGY | Facility: CLINIC | Age: 89
End: 2025-02-13

## 2025-05-14 ENCOUNTER — APPOINTMENT (OUTPATIENT)
Dept: CARDIOLOGY | Facility: CLINIC | Age: 89
End: 2025-05-14